# Patient Record
Sex: FEMALE | Race: BLACK OR AFRICAN AMERICAN | Employment: FULL TIME | URBAN - NONMETROPOLITAN AREA
[De-identification: names, ages, dates, MRNs, and addresses within clinical notes are randomized per-mention and may not be internally consistent; named-entity substitution may affect disease eponyms.]

---

## 2017-05-08 ENCOUNTER — OFFICE VISIT (OUTPATIENT)
Dept: FAMILY MEDICINE CLINIC | Age: 42
End: 2017-05-08

## 2017-05-08 VITALS
WEIGHT: 174 LBS | BODY MASS INDEX: 29.71 KG/M2 | RESPIRATION RATE: 15 BRPM | DIASTOLIC BLOOD PRESSURE: 76 MMHG | HEART RATE: 68 BPM | SYSTOLIC BLOOD PRESSURE: 118 MMHG | HEIGHT: 64 IN

## 2017-05-08 DIAGNOSIS — Z13.9 SCREENING: ICD-10-CM

## 2017-05-08 DIAGNOSIS — E11.9 TYPE 2 DIABETES MELLITUS WITHOUT COMPLICATION, WITHOUT LONG-TERM CURRENT USE OF INSULIN (HCC): Primary | ICD-10-CM

## 2017-05-08 DIAGNOSIS — E11.9 TYPE 2 DIABETES MELLITUS WITHOUT COMPLICATION, WITHOUT LONG-TERM CURRENT USE OF INSULIN (HCC): ICD-10-CM

## 2017-05-08 PROCEDURE — 99202 OFFICE O/P NEW SF 15 MIN: CPT | Performed by: NURSE PRACTITIONER

## 2017-05-08 PROCEDURE — 82044 UR ALBUMIN SEMIQUANTITATIVE: CPT | Performed by: NURSE PRACTITIONER

## 2017-05-08 RX ORDER — GLIMEPIRIDE 4 MG/1
4 TABLET ORAL NIGHTLY
Qty: 30 TABLET | Refills: 3 | Status: SHIPPED | OUTPATIENT
Start: 2017-05-08 | End: 2017-05-09 | Stop reason: DRUGHIGH

## 2017-05-08 RX ORDER — ATORVASTATIN CALCIUM 40 MG/1
40 TABLET, FILM COATED ORAL DAILY
Qty: 30 TABLET | Refills: 3 | Status: SHIPPED | OUTPATIENT
Start: 2017-05-08 | End: 2017-09-05 | Stop reason: SDUPTHER

## 2017-05-08 RX ORDER — LISINOPRIL 20 MG/1
20 TABLET ORAL NIGHTLY
Qty: 30 TABLET | Refills: 3 | Status: SHIPPED | OUTPATIENT
Start: 2017-05-08 | End: 2017-09-23 | Stop reason: SDUPTHER

## 2017-05-08 ASSESSMENT — ENCOUNTER SYMPTOMS
COUGH: 0
DIARRHEA: 0
CONSTIPATION: 0
CHEST TIGHTNESS: 0
NAUSEA: 0
SHORTNESS OF BREATH: 0
WHEEZING: 0
VOMITING: 0
ABDOMINAL PAIN: 0

## 2017-05-08 ASSESSMENT — PATIENT HEALTH QUESTIONNAIRE - PHQ9
SUM OF ALL RESPONSES TO PHQ QUESTIONS 1-9: 0
2. FEELING DOWN, DEPRESSED OR HOPELESS: 0
1. LITTLE INTEREST OR PLEASURE IN DOING THINGS: 0
SUM OF ALL RESPONSES TO PHQ9 QUESTIONS 1 & 2: 0

## 2017-05-09 DIAGNOSIS — E11.9 TYPE 2 DIABETES MELLITUS WITHOUT COMPLICATION, WITHOUT LONG-TERM CURRENT USE OF INSULIN (HCC): ICD-10-CM

## 2017-05-09 LAB
A/G RATIO: 1.5 (ref 1.1–2.2)
ALBUMIN SERPL-MCNC: 4 G/DL (ref 3.4–5)
ALP BLD-CCNC: 64 U/L (ref 40–129)
ALT SERPL-CCNC: 57 U/L (ref 10–40)
ANION GAP SERPL CALCULATED.3IONS-SCNC: 18 MMOL/L (ref 3–16)
AST SERPL-CCNC: 29 U/L (ref 15–37)
BILIRUB SERPL-MCNC: 0.3 MG/DL (ref 0–1)
BUN BLDV-MCNC: 12 MG/DL (ref 7–20)
CALCIUM SERPL-MCNC: 9 MG/DL (ref 8.3–10.6)
CHLORIDE BLD-SCNC: 98 MMOL/L (ref 99–110)
CHOLESTEROL, TOTAL: 97 MG/DL (ref 0–199)
CO2: 21 MMOL/L (ref 21–32)
CREAT SERPL-MCNC: 0.9 MG/DL (ref 0.6–1.1)
ESTIMATED AVERAGE GLUCOSE: 248.9 MG/DL
GFR AFRICAN AMERICAN: >60
GFR NON-AFRICAN AMERICAN: >60
GLOBULIN: 2.7 G/DL
GLUCOSE BLD-MCNC: 330 MG/DL (ref 70–99)
HBA1C MFR BLD: 10.3 %
HCT VFR BLD CALC: 38.4 % (ref 36–48)
HDLC SERPL-MCNC: 38 MG/DL (ref 40–60)
HEMOGLOBIN: 12.7 G/DL (ref 12–16)
LDL CHOLESTEROL CALCULATED: 17 MG/DL
MCH RBC QN AUTO: 30.9 PG (ref 26–34)
MCHC RBC AUTO-ENTMCNC: 33.1 G/DL (ref 31–36)
MCV RBC AUTO: 93.2 FL (ref 80–100)
PDW BLD-RTO: 12.3 % (ref 12.4–15.4)
PLATELET # BLD: 264 K/UL (ref 135–450)
PMV BLD AUTO: 9.9 FL (ref 5–10.5)
POTASSIUM SERPL-SCNC: 3.8 MMOL/L (ref 3.5–5.1)
RBC # BLD: 4.12 M/UL (ref 4–5.2)
SODIUM BLD-SCNC: 137 MMOL/L (ref 136–145)
TOTAL PROTEIN: 6.7 G/DL (ref 6.4–8.2)
TRIGL SERPL-MCNC: 210 MG/DL (ref 0–150)
TSH REFLEX: 1.07 UIU/ML (ref 0.27–4.2)
VITAMIN D 25-HYDROXY: 39.3 NG/ML
VLDLC SERPL CALC-MCNC: 42 MG/DL
WBC # BLD: 10.7 K/UL (ref 4–11)

## 2017-05-09 RX ORDER — GLIMEPIRIDE 2 MG/1
2 TABLET ORAL EVERY MORNING
Qty: 30 TABLET | Refills: 3 | Status: SHIPPED | OUTPATIENT
Start: 2017-05-09 | End: 2017-09-06 | Stop reason: SDUPTHER

## 2017-05-09 RX ORDER — GLIMEPIRIDE 4 MG/1
4 TABLET ORAL
Qty: 30 TABLET | Refills: 3 | Status: SHIPPED | OUTPATIENT
Start: 2017-05-09 | End: 2017-09-06 | Stop reason: SDUPTHER

## 2017-05-10 LAB — HIV-1 AND HIV-2 ANTIBODIES: NEGATIVE

## 2017-06-12 ENCOUNTER — OFFICE VISIT (OUTPATIENT)
Dept: FAMILY MEDICINE CLINIC | Age: 42
End: 2017-06-12

## 2017-06-12 VITALS
HEIGHT: 64 IN | BODY MASS INDEX: 29.37 KG/M2 | HEART RATE: 97 BPM | OXYGEN SATURATION: 98 % | DIASTOLIC BLOOD PRESSURE: 70 MMHG | SYSTOLIC BLOOD PRESSURE: 122 MMHG | WEIGHT: 172 LBS

## 2017-06-12 DIAGNOSIS — Z23 NEED FOR PROPHYLACTIC VACCINATION AGAINST DIPHTHERIA-TETANUS-PERTUSSIS (DTP): ICD-10-CM

## 2017-06-12 DIAGNOSIS — E11.9 TYPE 2 DIABETES MELLITUS WITHOUT COMPLICATION, WITHOUT LONG-TERM CURRENT USE OF INSULIN (HCC): Primary | ICD-10-CM

## 2017-06-12 DIAGNOSIS — R05.9 COUGH: ICD-10-CM

## 2017-06-12 DIAGNOSIS — J30.9 ALLERGIC RHINITIS, UNSPECIFIED ALLERGIC RHINITIS TRIGGER, UNSPECIFIED RHINITIS SEASONALITY: ICD-10-CM

## 2017-06-12 LAB
CREATININE URINE POCT: 200
MICROALBUMIN/CREAT 24H UR: 150 MG/G{CREAT}
MICROALBUMIN/CREAT UR-RTO: 30

## 2017-06-12 PROCEDURE — 90715 TDAP VACCINE 7 YRS/> IM: CPT | Performed by: NURSE PRACTITIONER

## 2017-06-12 PROCEDURE — 90471 IMMUNIZATION ADMIN: CPT | Performed by: NURSE PRACTITIONER

## 2017-06-12 PROCEDURE — 82044 UR ALBUMIN SEMIQUANTITATIVE: CPT | Performed by: NURSE PRACTITIONER

## 2017-06-12 PROCEDURE — 99213 OFFICE O/P EST LOW 20 MIN: CPT | Performed by: NURSE PRACTITIONER

## 2017-06-12 RX ORDER — LORATADINE 10 MG/1
10 TABLET ORAL DAILY
Qty: 30 TABLET | Refills: 0 | Status: SHIPPED | OUTPATIENT
Start: 2017-06-12 | End: 2017-08-14 | Stop reason: ALTCHOICE

## 2017-06-12 ASSESSMENT — ENCOUNTER SYMPTOMS
NAUSEA: 0
WHEEZING: 0
ABDOMINAL PAIN: 0
COUGH: 1
SHORTNESS OF BREATH: 0
CONSTIPATION: 0
SORE THROAT: 0
DIARRHEA: 0
VOMITING: 0
CHEST TIGHTNESS: 0

## 2017-07-17 ENCOUNTER — TELEPHONE (OUTPATIENT)
Dept: FAMILY MEDICINE CLINIC | Age: 42
End: 2017-07-17

## 2017-08-14 ENCOUNTER — OFFICE VISIT (OUTPATIENT)
Dept: FAMILY MEDICINE CLINIC | Age: 42
End: 2017-08-14

## 2017-08-14 VITALS
BODY MASS INDEX: 30.8 KG/M2 | HEIGHT: 64 IN | WEIGHT: 180.4 LBS | OXYGEN SATURATION: 97 % | HEART RATE: 81 BPM | SYSTOLIC BLOOD PRESSURE: 134 MMHG | DIASTOLIC BLOOD PRESSURE: 64 MMHG | RESPIRATION RATE: 16 BRPM

## 2017-08-14 DIAGNOSIS — E11.9 TYPE 2 DIABETES MELLITUS WITHOUT COMPLICATION, WITHOUT LONG-TERM CURRENT USE OF INSULIN (HCC): Primary | ICD-10-CM

## 2017-08-14 LAB — HBA1C MFR BLD: 7.7 %

## 2017-08-14 PROCEDURE — 83036 HEMOGLOBIN GLYCOSYLATED A1C: CPT | Performed by: NURSE PRACTITIONER

## 2017-08-14 PROCEDURE — 99213 OFFICE O/P EST LOW 20 MIN: CPT | Performed by: NURSE PRACTITIONER

## 2017-08-14 ASSESSMENT — ENCOUNTER SYMPTOMS
VOMITING: 0
CONSTIPATION: 0
COUGH: 0
NAUSEA: 0
CHEST TIGHTNESS: 0
SHORTNESS OF BREATH: 0
DIARRHEA: 0
ABDOMINAL PAIN: 0
WHEEZING: 0

## 2017-08-21 ENCOUNTER — OFFICE VISIT (OUTPATIENT)
Dept: FAMILY MEDICINE CLINIC | Age: 42
End: 2017-08-21

## 2017-08-21 VITALS
HEIGHT: 65 IN | OXYGEN SATURATION: 99 % | BODY MASS INDEX: 29.82 KG/M2 | SYSTOLIC BLOOD PRESSURE: 118 MMHG | DIASTOLIC BLOOD PRESSURE: 64 MMHG | RESPIRATION RATE: 15 BRPM | WEIGHT: 179 LBS | HEART RATE: 96 BPM

## 2017-08-21 DIAGNOSIS — B02.9 HERPES ZOSTER WITHOUT COMPLICATION: Primary | ICD-10-CM

## 2017-08-21 PROCEDURE — 99214 OFFICE O/P EST MOD 30 MIN: CPT | Performed by: NURSE PRACTITIONER

## 2017-08-21 RX ORDER — VALACYCLOVIR HYDROCHLORIDE 1 G/1
1000 TABLET, FILM COATED ORAL 3 TIMES DAILY
Qty: 21 TABLET | Refills: 0 | Status: SHIPPED | OUTPATIENT
Start: 2017-08-21 | End: 2017-08-28

## 2017-08-21 ASSESSMENT — ENCOUNTER SYMPTOMS
ABDOMINAL PAIN: 0
COUGH: 0
VOMITING: 0
CHEST TIGHTNESS: 0
CONSTIPATION: 0
NAUSEA: 0
WHEEZING: 0
SHORTNESS OF BREATH: 0
DIARRHEA: 0
RHINORRHEA: 0

## 2017-09-05 DIAGNOSIS — E11.9 TYPE 2 DIABETES MELLITUS WITHOUT COMPLICATION, WITHOUT LONG-TERM CURRENT USE OF INSULIN (HCC): ICD-10-CM

## 2017-09-06 DIAGNOSIS — E11.9 TYPE 2 DIABETES MELLITUS WITHOUT COMPLICATION, WITHOUT LONG-TERM CURRENT USE OF INSULIN (HCC): ICD-10-CM

## 2017-09-06 RX ORDER — GLIMEPIRIDE 2 MG/1
2 TABLET ORAL EVERY MORNING
Qty: 30 TABLET | Refills: 3 | Status: SHIPPED | OUTPATIENT
Start: 2017-09-06 | End: 2018-01-02 | Stop reason: SDUPTHER

## 2017-09-06 RX ORDER — GLIMEPIRIDE 4 MG/1
4 TABLET ORAL
Qty: 30 TABLET | Refills: 3 | Status: SHIPPED | OUTPATIENT
Start: 2017-09-06 | End: 2018-01-10 | Stop reason: SDUPTHER

## 2017-09-06 RX ORDER — ATORVASTATIN CALCIUM 40 MG/1
TABLET, FILM COATED ORAL
Qty: 30 TABLET | Refills: 3 | Status: SHIPPED | OUTPATIENT
Start: 2017-09-06 | End: 2018-01-02 | Stop reason: SDUPTHER

## 2017-09-23 DIAGNOSIS — E11.9 TYPE 2 DIABETES MELLITUS WITHOUT COMPLICATION, WITHOUT LONG-TERM CURRENT USE OF INSULIN (HCC): ICD-10-CM

## 2017-09-25 RX ORDER — LISINOPRIL 20 MG/1
TABLET ORAL
Qty: 30 TABLET | Refills: 3 | Status: SHIPPED | OUTPATIENT
Start: 2017-09-25 | End: 2018-02-05 | Stop reason: SDUPTHER

## 2017-11-13 ENCOUNTER — OFFICE VISIT (OUTPATIENT)
Dept: FAMILY MEDICINE CLINIC | Age: 42
End: 2017-11-13

## 2017-11-13 VITALS
HEART RATE: 99 BPM | WEIGHT: 181 LBS | DIASTOLIC BLOOD PRESSURE: 76 MMHG | RESPIRATION RATE: 16 BRPM | OXYGEN SATURATION: 99 % | SYSTOLIC BLOOD PRESSURE: 130 MMHG | BODY MASS INDEX: 30.59 KG/M2

## 2017-11-13 DIAGNOSIS — E11.9 TYPE 2 DIABETES MELLITUS WITHOUT COMPLICATION, WITHOUT LONG-TERM CURRENT USE OF INSULIN (HCC): Primary | ICD-10-CM

## 2017-11-13 PROBLEM — R05.9 COUGH: Status: RESOLVED | Noted: 2017-06-12 | Resolved: 2017-11-13

## 2017-11-13 PROCEDURE — 90688 IIV4 VACCINE SPLT 0.5 ML IM: CPT | Performed by: NURSE PRACTITIONER

## 2017-11-13 PROCEDURE — 90471 IMMUNIZATION ADMIN: CPT | Performed by: NURSE PRACTITIONER

## 2017-11-13 PROCEDURE — 99213 OFFICE O/P EST LOW 20 MIN: CPT | Performed by: NURSE PRACTITIONER

## 2017-11-13 ASSESSMENT — ENCOUNTER SYMPTOMS
VOMITING: 0
CONSTIPATION: 0
ABDOMINAL PAIN: 0
CHEST TIGHTNESS: 0
NAUSEA: 0
WHEEZING: 0
SHORTNESS OF BREATH: 0
DIARRHEA: 0
COUGH: 0

## 2017-11-13 NOTE — PROGRESS NOTES
SUBJECTIVE:    Valorie Peres  1975  43 y.o.  female      Chief Complaint   Patient presents with    3 Month Follow-Up     no concerns, doing well    Flu Vaccine     HPI    Problem List Items Addressed This Visit     Type 2 diabetes mellitus without complication ([facility] [address] - Primary     Running \"good, same as last time\"  Average morning glucose 90s  Average evening glucose in 90s  She has had occasional lows, but only when she skips a meal           Other Visit Diagnoses    None. Review of Systems   Constitutional: Negative for appetite change, chills, fatigue and unexpected weight change. Respiratory: Negative for cough, chest tightness, shortness of breath and wheezing. Cardiovascular: Negative for chest pain, palpitations and leg swelling. Gastrointestinal: Negative for abdominal pain, constipation, diarrhea, nausea and vomiting. Musculoskeletal: Negative for arthralgias. Neurological: Negative for weakness, numbness and headaches. Hematological: Negative for adenopathy. OBJECTIVE:    /76 (Site: Right Arm, Position: Sitting, Cuff Size: Medium Adult)   Pulse 99   Resp 16   Wt 181 lb (82.1 kg)   SpO2 99%   BMI 30.59 kg/m²     Physical Exam   Constitutional: She is oriented to person, place, and time. She appears well-developed and well-nourished. HENT:   Head: Normocephalic and atraumatic. Neck: Normal range of motion. Neck supple. Cardiovascular: Normal rate, regular rhythm and normal heart sounds. Exam reveals no gallop and no friction rub. No murmur heard. Pulmonary/Chest: Effort normal and breath sounds normal. No respiratory distress. She has no wheezes. She has no rhonchi. She has no rales. Abdominal: Soft. She exhibits no distension. There is no tenderness. Musculoskeletal: Normal range of motion. Neurological: She is alert and oriented to person, place, and time. Skin: Skin is warm and dry. Psychiatric: She has a normal mood and affect.

## 2017-11-20 VITALS
HEIGHT: 65 IN | HEART RATE: 92 BPM | WEIGHT: 180 LBS | SYSTOLIC BLOOD PRESSURE: 120 MMHG | DIASTOLIC BLOOD PRESSURE: 85 MMHG | BODY MASS INDEX: 29.99 KG/M2 | TEMPERATURE: 98.9 F

## 2018-01-02 DIAGNOSIS — E11.9 TYPE 2 DIABETES MELLITUS WITHOUT COMPLICATION, WITHOUT LONG-TERM CURRENT USE OF INSULIN (HCC): ICD-10-CM

## 2018-01-02 RX ORDER — GLIMEPIRIDE 2 MG/1
TABLET ORAL
Qty: 30 TABLET | Refills: 3 | Status: SHIPPED | OUTPATIENT
Start: 2018-01-02 | End: 2018-04-30 | Stop reason: SDUPTHER

## 2018-01-02 RX ORDER — ATORVASTATIN CALCIUM 40 MG/1
TABLET, FILM COATED ORAL
Qty: 30 TABLET | Refills: 3 | Status: SHIPPED | OUTPATIENT
Start: 2018-01-02 | End: 2018-05-07 | Stop reason: SDUPTHER

## 2018-02-19 ENCOUNTER — OFFICE VISIT (OUTPATIENT)
Dept: FAMILY MEDICINE CLINIC | Age: 43
End: 2018-02-19

## 2018-02-19 VITALS
SYSTOLIC BLOOD PRESSURE: 138 MMHG | OXYGEN SATURATION: 100 % | HEART RATE: 98 BPM | DIASTOLIC BLOOD PRESSURE: 86 MMHG | BODY MASS INDEX: 30.32 KG/M2 | RESPIRATION RATE: 20 BRPM | WEIGHT: 179.4 LBS

## 2018-02-19 DIAGNOSIS — I10 ESSENTIAL HYPERTENSION: ICD-10-CM

## 2018-02-19 DIAGNOSIS — E11.9 TYPE 2 DIABETES MELLITUS WITHOUT COMPLICATION, WITHOUT LONG-TERM CURRENT USE OF INSULIN (HCC): Primary | ICD-10-CM

## 2018-02-19 DIAGNOSIS — N93.9 ABNORMAL UTERINE BLEEDING (AUB): ICD-10-CM

## 2018-02-19 LAB — HBA1C MFR BLD: 8.2 %

## 2018-02-19 PROCEDURE — 99213 OFFICE O/P EST LOW 20 MIN: CPT | Performed by: NURSE PRACTITIONER

## 2018-02-19 PROCEDURE — 83036 HEMOGLOBIN GLYCOSYLATED A1C: CPT | Performed by: NURSE PRACTITIONER

## 2018-02-19 ASSESSMENT — ENCOUNTER SYMPTOMS
DIARRHEA: 0
WHEEZING: 0
VOMITING: 0
NAUSEA: 0
ABDOMINAL PAIN: 0
CONSTIPATION: 0
CHEST TIGHTNESS: 0
COUGH: 0
SHORTNESS OF BREATH: 0

## 2018-02-19 NOTE — PROGRESS NOTES
SUBJECTIVE:    Phoenix Germain  1975  43 y.o.  female      Chief Complaint   Patient presents with    3 Month Follow-Up     Pt. states no concerns today. HPI    Allergies   Allergen Reactions    Pravachol [Pravastatin] Nausea Only     Past Medical History:   Diagnosis Date    Abnormal uterine bleeding     Cough     Occ. Productive Cough Clear Sputum    Diabetes mellitus ([facility] [address] Dx 2006 Or 2007    Fibroids     Hypertension     \"on medication for high blood pressure of 6- 7 yrs\"    Sinus problem     Teeth missing     Upper And Lower    Viral gastroenteritis     Sweet teeth extracted     One Sweet Tooth Extracted In Past     Past Surgical History:   Procedure Laterality Date    CHOLECYSTECTOMY, LAPAROSCOPIC  7-7-15    COLONOSCOPY  2000's    ENDOMETRIAL ABLATION  2012    Tubal Ligation Also Done    HYSTERECTOMY  4/19/16    supracervical ABD hysterectomy/ repair of serosal tear of bowel    TUBAL LIGATION  2012    Done With Endometrial Ablation    WISDOM TOOTH EXTRACTION      One Sweet Tooth Extracted In Past     Social History     Tobacco History     Smoking Status  Current Some Day Smoker Smoking Start Date  4/15/2000 Smoking Frequency  0 packs/day for 16 years (0 pk yrs)    Smokeless Tobacco Use  Never Used    Tobacco Comment  \"I Smoke Black And Mild, They Are Like Little Cigars, Only Smoke Occ\"          Alcohol History     Alcohol Use Status  Yes Comment  \"Occ.  A Couple Times A Week\"          Drug Use     Drug Use Status  No          Sexual Activity     Sexually Active  Yes Partners  Male                Problem List Items Addressed This Visit     Abnormal uterine bleeding (AUB)     Dr. Per Bhat has her taking oral contraceptives daily         Type 2 diabetes mellitus without complication ([facility] [address] - Primary     Started going to gym this month  Going 4-5 days a week  At gym for 1-1.5 hours   States has felt hypoglycemic approximately once a week--usually around 100  Not always eating prior to going to gym     Morning glucose around 120-130    Denies numbness, tingling    A1c up to 8.2 from 7.7         Relevant Orders    POCT glycosylated hemoglobin (Hb A1C) (Completed)    Essential hypertension     Stable on lisinopril  Patient denies any exertional chest pain, dyspnea, palpitations, syncope, orthopnea, edema or paroxysmal nocturnal dyspnea. Other Visit Diagnoses    None. Review of Systems   Constitutional: Negative for appetite change, chills, fatigue and unexpected weight change. Respiratory: Negative for cough, chest tightness, shortness of breath and wheezing. Cardiovascular: Negative for chest pain. Gastrointestinal: Negative for abdominal pain, constipation, diarrhea, nausea and vomiting. Musculoskeletal: Negative for arthralgias. Neurological: Negative for weakness, numbness and headaches. Hematological: Negative for adenopathy. OBJECTIVE:    /86   Pulse 98   Resp 20   Wt 179 lb 6.4 oz (81.4 kg)   SpO2 100%   BMI 30.32 kg/m²     Physical Exam   Constitutional: She is oriented to person, place, and time. She appears well-developed and well-nourished. HENT:   Head: Normocephalic and atraumatic. Neck: Normal range of motion. Neck supple. No JVD present. Carotid bruit is not present. Cardiovascular: Normal rate, regular rhythm and normal heart sounds. Exam reveals no gallop and no friction rub. No murmur heard. Pulmonary/Chest: Effort normal and breath sounds normal. No respiratory distress. She has no wheezes. She has no rhonchi. She has no rales. Musculoskeletal: Normal range of motion. She exhibits no edema. Neurological: She is alert and oriented to person, place, and time. Skin: Skin is warm and dry. Psychiatric: She has a normal mood and affect. Her behavior is normal.       ASSESSMENT/PLAN:    1.  Type 2 diabetes mellitus without complication, without long-term current use of insulin ([facility] [address]  Patient to continue current

## 2018-02-19 NOTE — ASSESSMENT & PLAN NOTE
Started going to gym this month  Going 4-5 days a week  At gym for 1-1.5 hours   States has felt hypoglycemic approximately once a week--usually around 100  Not always eating prior to going to gym     Morning glucose around 120-130    Denies numbness, tingling    A1c up to 8.2 from 7.7

## 2018-04-30 DIAGNOSIS — E11.9 TYPE 2 DIABETES MELLITUS WITHOUT COMPLICATION, WITHOUT LONG-TERM CURRENT USE OF INSULIN (HCC): ICD-10-CM

## 2018-04-30 RX ORDER — GLIMEPIRIDE 2 MG/1
TABLET ORAL
Qty: 30 TABLET | Refills: 5 | Status: SHIPPED | OUTPATIENT
Start: 2018-04-30 | End: 2018-07-20

## 2018-05-07 DIAGNOSIS — E11.9 TYPE 2 DIABETES MELLITUS WITHOUT COMPLICATION, WITHOUT LONG-TERM CURRENT USE OF INSULIN (HCC): ICD-10-CM

## 2018-05-07 RX ORDER — ATORVASTATIN CALCIUM 40 MG/1
TABLET, FILM COATED ORAL
Qty: 30 TABLET | Refills: 5 | Status: SHIPPED | OUTPATIENT
Start: 2018-05-07 | End: 2018-10-31 | Stop reason: SDUPTHER

## 2018-06-08 ENCOUNTER — HOSPITAL ENCOUNTER (OUTPATIENT)
Dept: MAMMOGRAPHY | Age: 43
Discharge: HOME | End: 2018-06-08
Attending: OBSTETRICS & GYNECOLOGY | Admitting: OBSTETRICS & GYNECOLOGY

## 2018-06-08 DIAGNOSIS — Z12.31 VISIT FOR SCREENING MAMMOGRAM: ICD-10-CM

## 2018-06-11 ENCOUNTER — TELEPHONE (OUTPATIENT)
Dept: FAMILY MEDICINE CLINIC | Age: 43
End: 2018-06-11

## 2018-07-25 DIAGNOSIS — E11.9 TYPE 2 DIABETES MELLITUS WITHOUT COMPLICATION, WITHOUT LONG-TERM CURRENT USE OF INSULIN (HCC): ICD-10-CM

## 2018-11-05 ENCOUNTER — OFFICE VISIT (OUTPATIENT)
Dept: FAMILY MEDICINE CLINIC | Age: 43
End: 2018-11-05
Payer: COMMERCIAL

## 2018-11-05 VITALS
OXYGEN SATURATION: 99 % | HEART RATE: 88 BPM | SYSTOLIC BLOOD PRESSURE: 120 MMHG | BODY MASS INDEX: 30.83 KG/M2 | RESPIRATION RATE: 20 BRPM | HEIGHT: 64 IN | WEIGHT: 180.6 LBS | DIASTOLIC BLOOD PRESSURE: 78 MMHG

## 2018-11-05 DIAGNOSIS — I10 ESSENTIAL HYPERTENSION: ICD-10-CM

## 2018-11-05 DIAGNOSIS — R05.9 COUGH: Primary | ICD-10-CM

## 2018-11-05 DIAGNOSIS — E11.9 TYPE 2 DIABETES MELLITUS WITHOUT COMPLICATION, WITHOUT LONG-TERM CURRENT USE OF INSULIN (HCC): ICD-10-CM

## 2018-11-05 LAB
A/G RATIO: 1.6 (ref 1.1–2.2)
ALBUMIN SERPL-MCNC: 4 G/DL (ref 3.4–5)
ALP BLD-CCNC: 58 U/L (ref 40–129)
ALT SERPL-CCNC: 29 U/L (ref 10–40)
ANION GAP SERPL CALCULATED.3IONS-SCNC: 18 MMOL/L (ref 3–16)
AST SERPL-CCNC: 18 U/L (ref 15–37)
BILIRUB SERPL-MCNC: 0.3 MG/DL (ref 0–1)
BUN BLDV-MCNC: 16 MG/DL (ref 7–20)
CALCIUM SERPL-MCNC: 9.6 MG/DL (ref 8.3–10.6)
CHLORIDE BLD-SCNC: 102 MMOL/L (ref 99–110)
CHOLESTEROL, TOTAL: 69 MG/DL (ref 0–199)
CO2: 21 MMOL/L (ref 21–32)
CREAT SERPL-MCNC: 0.9 MG/DL (ref 0.6–1.1)
CREATININE URINE POCT: 200
GFR AFRICAN AMERICAN: >60
GFR NON-AFRICAN AMERICAN: >60
GLOBULIN: 2.5 G/DL
GLUCOSE BLD-MCNC: 181 MG/DL (ref 70–99)
HCT VFR BLD CALC: 35.9 % (ref 36–48)
HDLC SERPL-MCNC: 37 MG/DL (ref 40–60)
HEMOGLOBIN: 12 G/DL (ref 12–16)
LDL CHOLESTEROL CALCULATED: 8 MG/DL
MCH RBC QN AUTO: 30.9 PG (ref 26–34)
MCHC RBC AUTO-ENTMCNC: 33.4 G/DL (ref 31–36)
MCV RBC AUTO: 92.8 FL (ref 80–100)
MICROALBUMIN/CREAT 24H UR: 150 MG/G{CREAT}
MICROALBUMIN/CREAT UR-RTO: ABNORMAL
PDW BLD-RTO: 12.4 % (ref 12.4–15.4)
PLATELET # BLD: 294 K/UL (ref 135–450)
PMV BLD AUTO: 9.6 FL (ref 5–10.5)
POTASSIUM SERPL-SCNC: 3.9 MMOL/L (ref 3.5–5.1)
RBC # BLD: 3.87 M/UL (ref 4–5.2)
SODIUM BLD-SCNC: 141 MMOL/L (ref 136–145)
TOTAL PROTEIN: 6.5 G/DL (ref 6.4–8.2)
TRIGL SERPL-MCNC: 119 MG/DL (ref 0–150)
VLDLC SERPL CALC-MCNC: 24 MG/DL
WBC # BLD: 9.3 K/UL (ref 4–11)

## 2018-11-05 PROCEDURE — 90686 IIV4 VACC NO PRSV 0.5 ML IM: CPT | Performed by: NURSE PRACTITIONER

## 2018-11-05 PROCEDURE — 82044 UR ALBUMIN SEMIQUANTITATIVE: CPT | Performed by: NURSE PRACTITIONER

## 2018-11-05 PROCEDURE — 99213 OFFICE O/P EST LOW 20 MIN: CPT | Performed by: NURSE PRACTITIONER

## 2018-11-05 PROCEDURE — 90471 IMMUNIZATION ADMIN: CPT | Performed by: NURSE PRACTITIONER

## 2018-11-05 RX ORDER — BENZONATATE 100 MG/1
100-200 CAPSULE ORAL 3 TIMES DAILY PRN
Qty: 30 CAPSULE | Refills: 0 | Status: SHIPPED | OUTPATIENT
Start: 2018-11-05 | End: 2018-11-12

## 2018-11-05 ASSESSMENT — ENCOUNTER SYMPTOMS
CONSTIPATION: 0
WHEEZING: 0
VOMITING: 0
DIARRHEA: 0
NAUSEA: 0
ABDOMINAL PAIN: 0
CHEST TIGHTNESS: 0
SHORTNESS OF BREATH: 0
COUGH: 1

## 2018-11-05 ASSESSMENT — PATIENT HEALTH QUESTIONNAIRE - PHQ9
SUM OF ALL RESPONSES TO PHQ QUESTIONS 1-9: 0
2. FEELING DOWN, DEPRESSED OR HOPELESS: 0
SUM OF ALL RESPONSES TO PHQ QUESTIONS 1-9: 0
SUM OF ALL RESPONSES TO PHQ9 QUESTIONS 1 & 2: 0
1. LITTLE INTEREST OR PLEASURE IN DOING THINGS: 0

## 2018-11-05 NOTE — PROGRESS NOTES
Constitutional: She is oriented to person, place, and time. She appears well-developed and well-nourished. HENT:   Head: Normocephalic and atraumatic. Neck: Normal range of motion. Neck supple. No JVD present. Carotid bruit is not present. Cardiovascular: Normal rate, regular rhythm and normal heart sounds. Exam reveals no gallop and no friction rub. No murmur heard. Pulses:       Dorsalis pedis pulses are 2+ on the right side, and 2+ on the left side. Pulmonary/Chest: Effort normal and breath sounds normal. No respiratory distress. She has no wheezes. She has no rhonchi. She has no rales. Abdominal: Soft. Musculoskeletal: Normal range of motion. She exhibits no edema. Right foot: There is no deformity. Left foot: There is no deformity. Feet:   Right Foot:   Protective Sensation: 10 sites tested. 10 sites sensed. Skin Integrity: Negative for ulcer, blister, skin breakdown or erythema. Left Foot:   Protective Sensation: 10 sites tested. 10 sites sensed. Skin Integrity: Negative for ulcer, blister, skin breakdown or erythema. Neurological: She is alert and oriented to person, place, and time. Skin: Skin is warm and dry. Psychiatric: She has a normal mood and affect. Her behavior is normal.       ASSESSMENT/PLAN:    1. Type 2 diabetes mellitus without complication, without long-term current use of insulin ([facility])  Continue to monitor glucose. Return in three months. Discussed eating snack in afternoon if longer length between meals to avoid symptoms of hypoglycemia  - CBC; Future  - Comprehensive Metabolic Panel; Future  - Lipid Panel; Future  - POCT microalbumin  - HM DIABETES FOOT EXAM    2. Essential hypertension  Continue current medication    3. Cough  Tessalon PRN. Follow up if cough persists. - benzonatate (TESSALON PERLES) 100 MG capsule; Take 1-2 capsules by mouth 3 times daily as needed for Cough  Dispense: 30 capsule;  Refill: 0               Return in about 3 months (around 2/5/2019).       (Please note that portions of this note may have been completed with a voice recognition program. Efforts were made to edit the dictations but occasionally words aremis-transcribed.)

## 2018-11-05 NOTE — PATIENT INSTRUCTIONS
[facility]: Asif Queen MD   Directions    [address], [place] [address] [phone]

## 2018-12-19 DIAGNOSIS — E11.9 TYPE 2 DIABETES MELLITUS WITHOUT COMPLICATION, WITHOUT LONG-TERM CURRENT USE OF INSULIN (HCC): ICD-10-CM

## 2018-12-20 DIAGNOSIS — E11.9 TYPE 2 DIABETES MELLITUS WITHOUT COMPLICATION, WITHOUT LONG-TERM CURRENT USE OF INSULIN (HCC): ICD-10-CM

## 2018-12-20 RX ORDER — GLIMEPIRIDE 2 MG/1
TABLET ORAL
Qty: 30 TABLET | Refills: 0 | Status: SHIPPED | OUTPATIENT
Start: 2018-12-20 | End: 2018-12-20 | Stop reason: SDUPTHER

## 2018-12-20 RX ORDER — GLIMEPIRIDE 2 MG/1
TABLET ORAL
Qty: 30 TABLET | Refills: 0 | Status: SHIPPED | OUTPATIENT
Start: 2018-12-20 | End: 2019-02-18 | Stop reason: SDUPTHER

## 2019-01-24 DIAGNOSIS — E11.9 TYPE 2 DIABETES MELLITUS WITHOUT COMPLICATION, WITHOUT LONG-TERM CURRENT USE OF INSULIN (HCC): ICD-10-CM

## 2019-02-27 DIAGNOSIS — E11.9 TYPE 2 DIABETES MELLITUS WITHOUT COMPLICATION, WITHOUT LONG-TERM CURRENT USE OF INSULIN (HCC): ICD-10-CM

## 2019-02-27 RX ORDER — LISINOPRIL 20 MG/1
TABLET ORAL
Qty: 30 TABLET | Refills: 5 | Status: SHIPPED | OUTPATIENT
Start: 2019-02-27 | End: 2019-05-16 | Stop reason: SDUPTHER

## 2019-04-25 ENCOUNTER — TELEPHONE (OUTPATIENT)
Dept: FAMILY MEDICINE CLINIC | Age: 44
End: 2019-04-25

## 2019-05-07 DIAGNOSIS — E11.9 TYPE 2 DIABETES MELLITUS WITHOUT COMPLICATION, WITHOUT LONG-TERM CURRENT USE OF INSULIN (HCC): ICD-10-CM

## 2019-05-08 RX ORDER — ATORVASTATIN CALCIUM 40 MG/1
TABLET, FILM COATED ORAL
Qty: 30 TABLET | Refills: 3 | Status: SHIPPED | OUTPATIENT
Start: 2019-05-08 | End: 2019-05-16 | Stop reason: SDUPTHER

## 2019-05-16 ENCOUNTER — OFFICE VISIT (OUTPATIENT)
Dept: FAMILY MEDICINE CLINIC | Age: 44
End: 2019-05-16
Payer: COMMERCIAL

## 2019-05-16 VITALS
DIASTOLIC BLOOD PRESSURE: 84 MMHG | HEART RATE: 114 BPM | SYSTOLIC BLOOD PRESSURE: 126 MMHG | BODY MASS INDEX: 30.21 KG/M2 | RESPIRATION RATE: 16 BRPM | WEIGHT: 176 LBS

## 2019-05-16 DIAGNOSIS — N93.9 ABNORMAL UTERINE BLEEDING (AUB): ICD-10-CM

## 2019-05-16 DIAGNOSIS — I10 ESSENTIAL HYPERTENSION: ICD-10-CM

## 2019-05-16 DIAGNOSIS — R00.0 TACHYCARDIA: ICD-10-CM

## 2019-05-16 DIAGNOSIS — E11.9 TYPE 2 DIABETES MELLITUS WITHOUT COMPLICATION, WITHOUT LONG-TERM CURRENT USE OF INSULIN (HCC): Primary | ICD-10-CM

## 2019-05-16 LAB — HBA1C MFR BLD: 8 %

## 2019-05-16 PROCEDURE — 83036 HEMOGLOBIN GLYCOSYLATED A1C: CPT | Performed by: NURSE PRACTITIONER

## 2019-05-16 PROCEDURE — 99213 OFFICE O/P EST LOW 20 MIN: CPT | Performed by: NURSE PRACTITIONER

## 2019-05-16 RX ORDER — GLIMEPIRIDE 2 MG/1
TABLET ORAL
Qty: 90 TABLET | Refills: 1 | Status: SHIPPED | OUTPATIENT
Start: 2019-05-16 | End: 2019-12-02 | Stop reason: SDUPTHER

## 2019-05-16 RX ORDER — ATORVASTATIN CALCIUM 40 MG/1
TABLET, FILM COATED ORAL
Qty: 90 TABLET | Refills: 3 | Status: SHIPPED | OUTPATIENT
Start: 2019-05-16 | End: 2020-06-03

## 2019-05-16 RX ORDER — LISINOPRIL 20 MG/1
TABLET ORAL
Qty: 90 TABLET | Refills: 3 | Status: SHIPPED | OUTPATIENT
Start: 2019-05-16 | End: 2020-05-28

## 2019-05-16 RX ORDER — GLIMEPIRIDE 4 MG/1
TABLET ORAL
Qty: 90 TABLET | Refills: 1 | Status: SHIPPED | OUTPATIENT
Start: 2019-05-16 | End: 2019-12-02 | Stop reason: SDUPTHER

## 2019-05-16 ASSESSMENT — ENCOUNTER SYMPTOMS
CHEST TIGHTNESS: 0
COUGH: 0
WHEEZING: 0
ABDOMINAL PAIN: 0
CONSTIPATION: 0
SHORTNESS OF BREATH: 0
NAUSEA: 0
DIARRHEA: 0
VOMITING: 0

## 2019-05-16 ASSESSMENT — PATIENT HEALTH QUESTIONNAIRE - PHQ9
1. LITTLE INTEREST OR PLEASURE IN DOING THINGS: 0
SUM OF ALL RESPONSES TO PHQ QUESTIONS 1-9: 0
2. FEELING DOWN, DEPRESSED OR HOPELESS: 0
SUM OF ALL RESPONSES TO PHQ9 QUESTIONS 1 & 2: 0
SUM OF ALL RESPONSES TO PHQ QUESTIONS 1-9: 0

## 2019-05-16 NOTE — PROGRESS NOTES
SUBJECTIVE:    Nenita Lena  1975  37 y.o.  female      Chief Complaint   Patient presents with    3 Month Follow-Up     HPI    Allergies   Allergen Reactions    Pravachol [Pravastatin] Nausea Only       Current Outpatient Medications   Medication Sig Dispense Refill    atorvastatin (LIPITOR) 40 MG tablet TAKE 1 TABLET BY MOUTH ONCE DAILY 90 tablet 3    lisinopril (PRINIVIL;ZESTRIL) 20 MG tablet TAKE 1 TABLET BY MOUTH ONCE DAILY AT NIGHT 90 tablet 3    metFORMIN (GLUCOPHAGE) 500 MG tablet TAKE 2 TABLETS BY MOUTH TWICE DAILY WITH MEALS 360 tablet 3    glimepiride (AMARYL) 4 MG tablet TAKE 1 TABLET BY MOUTH IN THE MORNING BEFORE BREAKFAST TAKE  WITH  2MG  TABLET  AS  DIRECTED 90 tablet 1    glimepiride (AMARYL) 2 MG tablet TAKE 1 TABLET BY MOUTH ONCE DAILY IN THE MORNING (WITH  4  MG  TABLET  AS  DIRECTED) 90 tablet 1    linagliptin (TRADJENTA) 5 MG tablet Take 1 tablet by mouth daily 90 tablet 1    Levonorgest-Eth Estrad 91-Day (CAMRESE LO PO) Take by mouth nightly       No current facility-administered medications for this visit. Past Medical History:   Diagnosis Date    Abnormal uterine bleeding     Cough     Occ.  Productive Cough Clear Sputum    Diabetes mellitus ([facility] [address] Dx 2006 Or 2007    Fibroids     Hypertension     \"on medication for high blood pressure of 6- 7 yrs\"    Sinus problem     Teeth missing     Upper And Lower    Viral gastroenteritis     [place] teeth extracted     One [place] Tooth Extracted In Past     Past Surgical History:   Procedure Laterality Date    CHOLECYSTECTOMY, LAPAROSCOPIC  7-7-15    COLONOSCOPY  2000's    ENDOMETRIAL ABLATION  2012    Tubal Ligation Also Done    HYSTERECTOMY  4/19/16    supracervical ABD hysterectomy/ repair of serosal tear of bowel    TUBAL LIGATION  2012    Done With Endometrial Ablation    WISDOM TOOTH EXTRACTION      One [place] Tooth Extracted In Past     Social History     Socioeconomic History    Marital status:      Spouse name: None    Number of children: None    Years of education: None    Highest education level: None   Occupational History    None   Social Needs    Financial resource strain: None    Food insecurity:     Worry: None     Inability: None    Transportation needs:     Medical: None     Non-medical: None   Tobacco Use    Smoking status: Current Some Day Smoker     Packs/day: 0.00     Years: 16.00     Pack years: 0.00     Start date: 4/15/2000    Smokeless tobacco: Never Used    Tobacco comment: \"I Smoke Black And Mild, They Are Like Little Cigars, Only Smoke Occ\"   Substance and Sexual Activity    Alcohol use: Yes     Comment: \"Occ. A Couple Times A Week\"    Drug use: No    Sexual activity: Yes     Partners: Male   Lifestyle    Physical activity:     Days per week: None     Minutes per session: None    Stress: None   Relationships    Social connections:     Talks on phone: None     Gets together: None     Attends Taoist service: None     Active member of club or organization: None     Attends meetings of clubs or organizations: None     Relationship status: None    Intimate partner violence:     Fear of current or ex partner: None     Emotionally abused: None     Physically abused: None     Forced sexual activity: None   Other Topics Concern    None   Social History Narrative    None         Type 2 diabetes mellitus without complication (HCC)  W7D 8.0. Denies numbness or tingling. Denies polyuria, polyphagia, polydipsia. She is doing maxwell three times a week. She is checking her glucose. Evening is around 80s. She has not been checking in the evening. Essential hypertension  Stable. Patient denies any exertional chest pain, dyspnea, palpitations, syncope, orthopnea, edema or paroxysmal nocturnal dyspnea. Abnormal uterine bleeding (AUB)  She had a partial hysterectomy by Dr. Dori Fajardo. She is on continuous oral contraceptives due to pain with periods. She goes yearly. Review of Systems   Constitutional: Negative for appetite change, chills, fatigue and unexpected weight change. Respiratory: Negative for cough, chest tightness, shortness of breath and wheezing. Cardiovascular: Negative for chest pain, palpitations and leg swelling. Gastrointestinal: Negative for abdominal pain, constipation, diarrhea, nausea and vomiting. Musculoskeletal: Negative for arthralgias. Neurological: Negative for weakness, numbness and headaches. Hematological: Negative for adenopathy. OBJECTIVE:    /84 (Site: Left Upper Arm, Position: Sitting, Cuff Size: Medium Adult)   Pulse 114   Resp 16   Wt 176 lb (79.8 kg)   LMP  (LMP Unknown)   BMI 30.21 kg/m²     Physical Exam   Constitutional: She is oriented to person, place, and time. She appears well-developed and well-nourished. HENT:   Head: Normocephalic and atraumatic. Neck: Normal range of motion. Neck supple. No JVD present. Carotid bruit is not present. Cardiovascular: Normal rate, regular rhythm and normal heart sounds. Exam reveals no gallop and no friction rub. No murmur heard. Pulmonary/Chest: Effort normal and breath sounds normal. No respiratory distress. She has no wheezes. She has no rhonchi. She has no rales. Abdominal: Soft. Musculoskeletal: Normal range of motion. She exhibits no edema. Neurological: She is alert and oriented to person, place, and time. Skin: Skin is warm and dry. Psychiatric: She has a normal mood and affect. Her behavior is normal.       ASSESSMENT/PLAN:    1. Type 2 diabetes mellitus without complication, without long-term current use of insulin ([facility])  A1c 8. Will start tradjenta. Continue to monitor glucose. Follow up in three months  - POCT glycosylated hemoglobin (Hb A1C)  - atorvastatin (LIPITOR) 40 MG tablet; TAKE 1 TABLET BY MOUTH ONCE DAILY  Dispense: 90 tablet;  Refill: 3  - lisinopril (PRINIVIL;ZESTRIL) 20 MG tablet; TAKE 1 TABLET BY MOUTH ONCE DAILY AT NIGHT  Dispense: 90 tablet; Refill: 3  - metFORMIN (GLUCOPHAGE) 500 MG tablet; TAKE 2 TABLETS BY MOUTH TWICE DAILY WITH MEALS  Dispense: 360 tablet; Refill: 3  - glimepiride (AMARYL) 4 MG tablet; TAKE 1 TABLET BY MOUTH IN THE MORNING BEFORE BREAKFAST TAKE  WITH  2MG  TABLET  AS  DIRECTED  Dispense: 90 tablet; Refill: 1  - glimepiride (AMARYL) 2 MG tablet; TAKE 1 TABLET BY MOUTH ONCE DAILY IN THE MORNING (WITH  4  MG  TABLET  AS  DIRECTED)  Dispense: 90 tablet; Refill: 1  - External Referral To Ophthalmology    2. Essential hypertension  Continue current medication    3. Abnormal uterine bleeding (AUB)  Follow up with gynecology as scheduled    4. Tachycardia  Patient only drank a glass of pop with lunch today. Likely dehydrated. Encouraged to increase fluid intake. Return in about 3 months (around 8/16/2019).       (Please note that portions of this note may have been completed with a voice recognition program. Efforts were made to edit the dictations but occasionally words aremis-transcribed.)

## 2019-05-16 NOTE — ASSESSMENT & PLAN NOTE
She had a partial hysterectomy by Dr. Tati Douglas. She is on continuous oral contraceptives due to pain with periods. She goes yearly.

## 2019-05-16 NOTE — ASSESSMENT & PLAN NOTE
A1c 8.0. Denies numbness or tingling. Denies polyuria, polyphagia, polydipsia. She is doing maxwell three times a week. She is checking her glucose. Evening is around 80s. She has not been checking in the evening.

## 2019-05-16 NOTE — PATIENT INSTRUCTIONS
[facility]: [address] [facility] MD [address], [facility], [facility] [address] [phone]

## 2019-05-17 ENCOUNTER — TELEPHONE (OUTPATIENT)
Dept: FAMILY MEDICINE CLINIC | Age: 44
End: 2019-05-17

## 2019-05-17 NOTE — TELEPHONE ENCOUNTER
Pt called in stating that Rx for Rand Kuo was $1241 and she was not going to be picking that up. Please advise.

## 2019-11-05 ENCOUNTER — HOSPITAL ENCOUNTER (OUTPATIENT)
Age: 44
Setting detail: SPECIMEN
Discharge: HOME | End: 2019-11-05

## 2019-11-05 PROCEDURE — 86735 MUMPS ANTIBODY: CPT

## 2019-11-05 PROCEDURE — 86765 RUBEOLA ANTIBODY: CPT

## 2019-11-05 PROCEDURE — 86787 VARICELLA-ZOSTER ANTIBODY: CPT

## 2019-11-05 PROCEDURE — 86762 RUBELLA ANTIBODY: CPT

## 2019-11-05 PROCEDURE — 86481 TB AG RESPONSE T-CELL SUSP: CPT

## 2019-11-07 LAB
MUV IGG SER QL: 106 AU/ML
MUV IGG SER QL: NORMAL AU/ML
RUBELLA ANTIBODY IGG: 9.1 IU/ML
RUBELLA ANTIBODY IGG: NORMAL IU/ML
RUBEOLA (MEASLES) AB IGG: 6.5 AU/ML
RUBEOLA (MEASLES) AB IGG: NORMAL AU/ML
VARICELLA-ZOSTER VIRUS AB, IGG: 2070 IV
VARICELLA-ZOSTER VIRUS AB, IGG: NORMAL IV

## 2019-11-08 LAB
NIL (NEGATIVE) SPOT CONTROL: NORMAL
PANEL A SPOT COUNT: 0
PANEL B SPOT COUNT: 0
POSITIVE CONTROL SPOT COUNT: NORMAL
POSITIVE CONTROL SPOT COUNT: NORMAL
TB CELL IMMUNE MEASURE: NORMAL

## 2019-11-18 ENCOUNTER — HOSPITAL ENCOUNTER (OUTPATIENT)
Dept: MAMMOGRAPHY | Age: 44
Discharge: HOME | End: 2019-11-18
Payer: COMMERCIAL

## 2019-11-18 DIAGNOSIS — Z12.31 VISIT FOR SCREENING MAMMOGRAM: ICD-10-CM

## 2019-11-18 PROCEDURE — 77063 BREAST TOMOSYNTHESIS BI: CPT

## 2019-12-19 ENCOUNTER — OFFICE VISIT (OUTPATIENT)
Dept: FAMILY MEDICINE CLINIC | Age: 44
End: 2019-12-19
Payer: COMMERCIAL

## 2019-12-19 VITALS
DIASTOLIC BLOOD PRESSURE: 80 MMHG | WEIGHT: 176.13 LBS | OXYGEN SATURATION: 98 % | HEART RATE: 111 BPM | TEMPERATURE: 99.4 F | RESPIRATION RATE: 16 BRPM | HEIGHT: 64 IN | SYSTOLIC BLOOD PRESSURE: 122 MMHG | BODY MASS INDEX: 30.07 KG/M2

## 2019-12-19 DIAGNOSIS — J40 BRONCHITIS: Primary | ICD-10-CM

## 2019-12-19 PROCEDURE — 99213 OFFICE O/P EST LOW 20 MIN: CPT | Performed by: NURSE PRACTITIONER

## 2019-12-19 RX ORDER — BENZONATATE 100 MG/1
100 CAPSULE ORAL 2 TIMES DAILY PRN
Qty: 20 CAPSULE | Refills: 0 | Status: SHIPPED | OUTPATIENT
Start: 2019-12-19 | End: 2019-12-26

## 2019-12-19 RX ORDER — AMOXICILLIN 500 MG/1
500 CAPSULE ORAL 2 TIMES DAILY
Qty: 20 CAPSULE | Refills: 0 | Status: SHIPPED | OUTPATIENT
Start: 2019-12-19 | End: 2019-12-29

## 2019-12-19 RX ORDER — ALBUTEROL SULFATE 90 UG/1
2 AEROSOL, METERED RESPIRATORY (INHALATION) 4 TIMES DAILY PRN
Qty: 1 INHALER | Refills: 0 | Status: SHIPPED | OUTPATIENT
Start: 2019-12-19 | End: 2020-07-09

## 2019-12-19 ASSESSMENT — PATIENT HEALTH QUESTIONNAIRE - PHQ9
SUM OF ALL RESPONSES TO PHQ QUESTIONS 1-9: 0
1. LITTLE INTEREST OR PLEASURE IN DOING THINGS: 0
2. FEELING DOWN, DEPRESSED OR HOPELESS: 0
SUM OF ALL RESPONSES TO PHQ QUESTIONS 1-9: 0
SUM OF ALL RESPONSES TO PHQ9 QUESTIONS 1 & 2: 0

## 2019-12-21 ASSESSMENT — ENCOUNTER SYMPTOMS
SORE THROAT: 0
SINUS PRESSURE: 0
EYE DISCHARGE: 0
BACK PAIN: 0
STRIDOR: 0
VOMITING: 0
PHOTOPHOBIA: 0
EYE PAIN: 0
COUGH: 1
ABDOMINAL DISTENTION: 0
RHINORRHEA: 0
EYE REDNESS: 0
DIARRHEA: 0
CHEST TIGHTNESS: 0
SHORTNESS OF BREATH: 0
WHEEZING: 0
ABDOMINAL PAIN: 0
SINUS PAIN: 0
NAUSEA: 0

## 2019-12-27 ENCOUNTER — OFFICE VISIT (OUTPATIENT)
Dept: FAMILY MEDICINE CLINIC | Age: 44
End: 2019-12-27
Payer: COMMERCIAL

## 2019-12-27 VITALS
RESPIRATION RATE: 18 BRPM | OXYGEN SATURATION: 99 % | BODY MASS INDEX: 30.07 KG/M2 | HEART RATE: 102 BPM | DIASTOLIC BLOOD PRESSURE: 82 MMHG | SYSTOLIC BLOOD PRESSURE: 118 MMHG | WEIGHT: 175.2 LBS

## 2019-12-27 DIAGNOSIS — J40 BRONCHITIS: Primary | ICD-10-CM

## 2019-12-27 PROCEDURE — 99213 OFFICE O/P EST LOW 20 MIN: CPT | Performed by: NURSE PRACTITIONER

## 2019-12-27 ASSESSMENT — ENCOUNTER SYMPTOMS
BACK PAIN: 0
SORE THROAT: 0
TROUBLE SWALLOWING: 0
WHEEZING: 0
CHEST TIGHTNESS: 0
ABDOMINAL PAIN: 0
VOMITING: 0
CONSTIPATION: 0
PHOTOPHOBIA: 0
DIARRHEA: 0
SINUS PRESSURE: 0
EYE PAIN: 0
NAUSEA: 0
SINUS PAIN: 0
COUGH: 1
RHINORRHEA: 0
BLOOD IN STOOL: 0
SHORTNESS OF BREATH: 0

## 2019-12-27 ASSESSMENT — PATIENT HEALTH QUESTIONNAIRE - PHQ9
SUM OF ALL RESPONSES TO PHQ QUESTIONS 1-9: 0
2. FEELING DOWN, DEPRESSED OR HOPELESS: 0
SUM OF ALL RESPONSES TO PHQ9 QUESTIONS 1 & 2: 0
SUM OF ALL RESPONSES TO PHQ QUESTIONS 1-9: 0
1. LITTLE INTEREST OR PLEASURE IN DOING THINGS: 0

## 2020-02-26 RX ORDER — GLIMEPIRIDE 2 MG/1
TABLET ORAL
Qty: 90 TABLET | Refills: 0 | Status: SHIPPED | OUTPATIENT
Start: 2020-02-26 | End: 2020-05-28

## 2020-07-04 NOTE — LETTER
[address] [place] [address]. Kayli Rae [address]  Phone: [phone]  Fax: [phone]    Curtis Lockharts, APRN - CNP        July 27, 2020    [address] [place] [address]      Dear Elda Barrera: Your medications have been refilled at this time but if you require more refills you will have to schedule an appt. Please note they will not be refilled without an appt. or virtual visit with your provider. You may schedule with any provider in the office to establish care. If you were a former patient of Dr. Ninoska Thorpe, please keep in mind he no longer with the practice. We have at this time two Nurse Practioners and Physicians Assistant that you may schedule a appt with at this time. Please contact the office if you have any questions.     Thank you,    Adolph Eddy LPN/ Patient/Caregiver provided printed discharge information.

## 2020-07-07 RX ORDER — ATORVASTATIN CALCIUM 40 MG/1
40 TABLET, FILM COATED ORAL DAILY
Qty: 90 TABLET | Refills: 1 | Status: SHIPPED | OUTPATIENT
Start: 2020-07-07 | End: 2020-07-09 | Stop reason: SDUPTHER

## 2020-07-07 RX ORDER — LISINOPRIL 20 MG/1
20 TABLET ORAL DAILY
Qty: 90 TABLET | Refills: 1 | Status: SHIPPED | OUTPATIENT
Start: 2020-07-07 | End: 2020-07-09 | Stop reason: SDUPTHER

## 2020-07-07 RX ORDER — GLIMEPIRIDE 2 MG/1
TABLET ORAL
Qty: 90 TABLET | Refills: 1 | Status: SHIPPED | OUTPATIENT
Start: 2020-07-07 | End: 2020-07-09 | Stop reason: SDUPTHER

## 2020-07-07 RX ORDER — ATORVASTATIN CALCIUM 40 MG/1
TABLET, FILM COATED ORAL
Qty: 14 TABLET | Refills: 1 | Status: SHIPPED | OUTPATIENT
Start: 2020-07-07 | End: 2020-07-07 | Stop reason: SDUPTHER

## 2020-07-07 RX ORDER — GLIMEPIRIDE 4 MG/1
TABLET ORAL
Qty: 90 TABLET | Refills: 1 | Status: SHIPPED | OUTPATIENT
Start: 2020-07-07 | End: 2020-07-09 | Stop reason: SDUPTHER

## 2020-07-07 NOTE — TELEPHONE ENCOUNTER
Patient has an appointment for 7/09/2020 but is out of medication now she would like 90 days on all of them please.

## 2020-07-09 ENCOUNTER — OFFICE VISIT (OUTPATIENT)
Dept: FAMILY MEDICINE CLINIC | Age: 45
End: 2020-07-09
Payer: COMMERCIAL

## 2020-07-09 VITALS
HEART RATE: 100 BPM | SYSTOLIC BLOOD PRESSURE: 120 MMHG | WEIGHT: 180.8 LBS | DIASTOLIC BLOOD PRESSURE: 72 MMHG | BODY MASS INDEX: 31.03 KG/M2 | TEMPERATURE: 96.6 F | RESPIRATION RATE: 20 BRPM | OXYGEN SATURATION: 99 %

## 2020-07-09 LAB
A/G RATIO: 1.7 (ref 1.1–2.2)
ALBUMIN SERPL-MCNC: 3.8 G/DL (ref 3.4–5)
ALP BLD-CCNC: 55 U/L (ref 40–129)
ALT SERPL-CCNC: 17 U/L (ref 10–40)
ANION GAP SERPL CALCULATED.3IONS-SCNC: 13 MMOL/L (ref 3–16)
AST SERPL-CCNC: 15 U/L (ref 15–37)
BASOPHILS ABSOLUTE: 0.1 K/UL (ref 0–0.2)
BASOPHILS RELATIVE PERCENT: 0.7 %
BILIRUB SERPL-MCNC: 0.6 MG/DL (ref 0–1)
BUN BLDV-MCNC: 10 MG/DL (ref 7–20)
CALCIUM SERPL-MCNC: 8.7 MG/DL (ref 8.3–10.6)
CHLORIDE BLD-SCNC: 103 MMOL/L (ref 99–110)
CHOLESTEROL, TOTAL: 66 MG/DL (ref 0–199)
CO2: 24 MMOL/L (ref 21–32)
CREAT SERPL-MCNC: 1.3 MG/DL (ref 0.6–1.1)
CREATININE URINE POCT: 200
EOSINOPHILS ABSOLUTE: 0.2 K/UL (ref 0–0.6)
EOSINOPHILS RELATIVE PERCENT: 2.1 %
GFR AFRICAN AMERICAN: 54
GFR NON-AFRICAN AMERICAN: 44
GLOBULIN: 2.3 G/DL
GLUCOSE BLD-MCNC: 170 MG/DL (ref 70–99)
HBA1C MFR BLD: 7.3 %
HCT VFR BLD CALC: 36.1 % (ref 36–48)
HDLC SERPL-MCNC: 39 MG/DL (ref 40–60)
HEMOGLOBIN: 12 G/DL (ref 12–16)
LDL CHOLESTEROL CALCULATED: 13 MG/DL
LYMPHOCYTES ABSOLUTE: 2.8 K/UL (ref 1–5.1)
LYMPHOCYTES RELATIVE PERCENT: 33.3 %
MCH RBC QN AUTO: 31.7 PG (ref 26–34)
MCHC RBC AUTO-ENTMCNC: 33.3 G/DL (ref 31–36)
MCV RBC AUTO: 95.1 FL (ref 80–100)
MICROALBUMIN/CREAT 24H UR: 150 MG/G{CREAT}
MICROALBUMIN/CREAT UR-RTO: ABNORMAL
MONOCYTES ABSOLUTE: 0.7 K/UL (ref 0–1.3)
MONOCYTES RELATIVE PERCENT: 8.1 %
NEUTROPHILS ABSOLUTE: 4.7 K/UL (ref 1.7–7.7)
NEUTROPHILS RELATIVE PERCENT: 55.8 %
PDW BLD-RTO: 12.8 % (ref 12.4–15.4)
PLATELET # BLD: 298 K/UL (ref 135–450)
PMV BLD AUTO: 9.2 FL (ref 5–10.5)
POTASSIUM SERPL-SCNC: 4.3 MMOL/L (ref 3.5–5.1)
RBC # BLD: 3.8 M/UL (ref 4–5.2)
SODIUM BLD-SCNC: 140 MMOL/L (ref 136–145)
TOTAL PROTEIN: 6.1 G/DL (ref 6.4–8.2)
TRIGL SERPL-MCNC: 69 MG/DL (ref 0–150)
VLDLC SERPL CALC-MCNC: 14 MG/DL
WBC # BLD: 8.4 K/UL (ref 4–11)

## 2020-07-09 PROCEDURE — 99214 OFFICE O/P EST MOD 30 MIN: CPT | Performed by: NURSE PRACTITIONER

## 2020-07-09 PROCEDURE — 3051F HG A1C>EQUAL 7.0%<8.0%: CPT | Performed by: NURSE PRACTITIONER

## 2020-07-09 PROCEDURE — 83036 HEMOGLOBIN GLYCOSYLATED A1C: CPT | Performed by: NURSE PRACTITIONER

## 2020-07-09 PROCEDURE — 82044 UR ALBUMIN SEMIQUANTITATIVE: CPT | Performed by: NURSE PRACTITIONER

## 2020-07-09 RX ORDER — LISINOPRIL 20 MG/1
20 TABLET ORAL DAILY
Qty: 90 TABLET | Refills: 1 | Status: SHIPPED | OUTPATIENT
Start: 2020-07-09 | End: 2021-03-11 | Stop reason: SDUPTHER

## 2020-07-09 RX ORDER — GLIMEPIRIDE 4 MG/1
TABLET ORAL
Qty: 90 TABLET | Refills: 1 | Status: SHIPPED | OUTPATIENT
Start: 2020-07-09 | End: 2021-02-11

## 2020-07-09 RX ORDER — ATORVASTATIN CALCIUM 40 MG/1
40 TABLET, FILM COATED ORAL DAILY
Qty: 90 TABLET | Refills: 1 | Status: SHIPPED | OUTPATIENT
Start: 2020-07-09 | End: 2021-03-11 | Stop reason: SDUPTHER

## 2020-07-09 RX ORDER — GLIMEPIRIDE 2 MG/1
TABLET ORAL
Qty: 90 TABLET | Refills: 1 | Status: SHIPPED | OUTPATIENT
Start: 2020-07-09 | End: 2021-01-18

## 2020-07-09 ASSESSMENT — PATIENT HEALTH QUESTIONNAIRE - PHQ9
SUM OF ALL RESPONSES TO PHQ9 QUESTIONS 1 & 2: 0
SUM OF ALL RESPONSES TO PHQ QUESTIONS 1-9: 0
2. FEELING DOWN, DEPRESSED OR HOPELESS: 0
SUM OF ALL RESPONSES TO PHQ QUESTIONS 1-9: 0
1. LITTLE INTEREST OR PLEASURE IN DOING THINGS: 0

## 2020-07-09 ASSESSMENT — ENCOUNTER SYMPTOMS
DIARRHEA: 0
VOMITING: 0
NAUSEA: 0
ABDOMINAL PAIN: 0
CONSTIPATION: 0
WHEEZING: 0
COUGH: 0
SHORTNESS OF BREATH: 0
CHEST TIGHTNESS: 0

## 2020-07-09 NOTE — PROGRESS NOTES
SUBJECTIVE:    Xin Monteiro  1975  39 y.o.  female      Chief Complaint   Patient presents with    Annual Exam     Pt here for one year follow up    Medication Refill     Pt would like all Rx's back to 90 day supplies     HPI    Allergies   Allergen Reactions    Pravachol [Pravastatin] Nausea Only       Current Outpatient Medications   Medication Sig Dispense Refill    metFORMIN (GLUCOPHAGE) 500 MG tablet Take 2 tablets by mouth 2 times daily (with meals) 360 tablet 1    lisinopril (PRINIVIL;ZESTRIL) 20 MG tablet Take 1 tablet by mouth daily 90 tablet 1    glimepiride (AMARYL) 4 MG tablet TAKE 1 TABLET BY MOUTH ONCE DAILY IN THE MORNING BEFORE  BREAKFAST  WITH  THE  2  MG  TABLET  AS  DIRECTED 90 tablet 1    glimepiride (AMARYL) 2 MG tablet TAKE 1 TABLET BY MOUTH IN THE MORNING WITH  4MG  TAB  AS  DIRECTED 90 tablet 1    atorvastatin (LIPITOR) 40 MG tablet Take 1 tablet by mouth daily 90 tablet 1    Levonorgest-Eth Estrad 91-Day (CAMRESE LO PO) Take by mouth nightly       No current facility-administered medications for this visit. Past Medical History:   Diagnosis Date    Abnormal uterine bleeding     Cough     Occ.  Productive Cough Clear Sputum    Diabetes mellitus ([facility] [address] Dx 2006 Or 2007    Fibroids     Hypertension     \"on medication for high blood pressure of 6- 7 yrs\"    Sinus problem     Teeth missing     Upper And Lower    Viral gastroenteritis     [place] teeth extracted     One [place] Tooth Extracted In Past     Past Surgical History:   Procedure Laterality Date    CHOLECYSTECTOMY, LAPAROSCOPIC  7-7-15    COLONOSCOPY  2000's    ENDOMETRIAL ABLATION  2012    Tubal Ligation Also Done    HYSTERECTOMY  4/19/16    supracervical ABD hysterectomy/ repair of serosal tear of bowel    TUBAL LIGATION  2012    Done With Endometrial Ablation    WISDOM TOOTH EXTRACTION      One [place] Tooth Extracted In Past     Social History     Socioeconomic History    Marital status:      Spouse name: None    Number of children: None    Years of education: None    Highest education level: None   Occupational History    None   Social Needs    Financial resource strain: None    Food insecurity     Worry: None     Inability: None    Transportation needs     Medical: None     Non-medical: None   Tobacco Use    Smoking status: Current Some Day Smoker     Packs/day: 0.00     Years: 16.00     Pack years: 0.00     Start date: 4/15/2000    Smokeless tobacco: Never Used    Tobacco comment: \"I Smoke Black And Mild, They Are Like Little Cigars, Only Smoke Occ\"   Substance and Sexual Activity    Alcohol use: Yes     Comment: \"Occ. A Couple Times A Week\"    Drug use: No    Sexual activity: Yes     Partners: Male   Lifestyle    Physical activity     Days per week: None     Minutes per session: None    Stress: None   Relationships    Social connections     Talks on phone: None     Gets together: None     Attends L[employer] service: None     Active member of club or organization: None     Attends meetings of clubs or organizations: None     Relationship status: None    Intimate partner violence     Fear of current or ex partner: None     Emotionally abused: None     Physically abused: None     Forced sexual activity: None   Other Topics Concern    None   Social History Narrative    None         Type 2 diabetes mellitus without complication ([facility] [address]  Checking glucose daily. Ranges from 115-150. Denies numbness. Occasional tingling in legs. Denies polyuria, polydipsia, polyphagia. Essential hypertension  Stable. Patient denies any exertional chest pain, dyspnea, palpitations, syncope, orthopnea, edema or paroxysmal nocturnal dyspnea. Abnormal uterine bleeding (AUB)  Followed by GYN. On oral contraceptives. Review of Systems   Constitutional: Negative for appetite change, chills, fatigue and unexpected weight change.    Respiratory: Negative for cough, chest tightness, shortness of breath and wheezing. Cardiovascular: Negative for chest pain, palpitations and leg swelling. Gastrointestinal: Negative for abdominal pain, constipation, diarrhea, nausea and vomiting. Musculoskeletal: Negative for arthralgias. Neurological: Negative for weakness, numbness and headaches. Hematological: Negative for adenopathy. OBJECTIVE:    /72 (Site: Left Upper Arm, Position: Sitting, Cuff Size: Medium Adult)   Pulse 100   Temp 96.6 °F (35.9 °C) (Infrared)   Resp 20   Wt 180 lb 12.8 oz (82 kg)   LMP  (LMP Unknown)   SpO2 99%   BMI 31.03 kg/m²     Physical Exam  Constitutional:       Appearance: Normal appearance. She is well-developed. HENT:      Head: Normocephalic and atraumatic. Right Ear: Hearing normal.      Left Ear: Hearing normal.      Nose: Nose normal.      Mouth/Throat:      Mouth: Mucous membranes are moist.      Pharynx: Oropharynx is clear. Eyes:      Extraocular Movements: Extraocular movements intact. Conjunctiva/sclera: Conjunctivae normal.      Pupils: Pupils are equal, round, and reactive to light. Neck:      Musculoskeletal: Normal range of motion and neck supple. Vascular: No carotid bruit or JVD. Cardiovascular:      Rate and Rhythm: Normal rate and regular rhythm. Pulses:           Dorsalis pedis pulses are 2+ on the right side and 2+ on the left side. Posterior tibial pulses are 2+ on the right side and 2+ on the left side. Heart sounds: Normal heart sounds. No murmur. No friction rub. No gallop. Pulmonary:      Effort: Pulmonary effort is normal. No respiratory distress. Breath sounds: Normal breath sounds. No wheezing, rhonchi or rales. Abdominal:      General: Bowel sounds are normal. There is no distension. Palpations: Abdomen is soft. Tenderness: There is no abdominal tenderness. There is no guarding. Musculoskeletal: Normal range of motion.    Feet:      Right foot:      Protective Sensation: 10 sites tested. 10 sites sensed. Skin integrity: Skin integrity normal.      Left foot:      Protective Sensation: 10 sites tested. 10 sites sensed. Skin integrity: Skin integrity normal.   Skin:     General: Skin is warm and dry. Neurological:      General: No focal deficit present. Mental Status: She is alert and oriented to person, place, and time. Psychiatric:         Mood and Affect: Mood normal.         Behavior: Behavior normal. Behavior is cooperative. Thought Content: Thought content normal.         ASSESSMENT/PLAN:    1. Type 2 diabetes mellitus without complication, without long-term current use of insulin ([facility])  Monitor glucose. Continue current medication. The patient is asked to make an attempt to improve diet and exercise patterns to aid in medical management of this problem. - POCT glycosylated hemoglobin (Hb A1C)  - metFORMIN (GLUCOPHAGE) 500 MG tablet; Take 2 tablets by mouth 2 times daily (with meals)  Dispense: 360 tablet; Refill: 1  - lisinopril (PRINIVIL;ZESTRIL) 20 MG tablet; Take 1 tablet by mouth daily  Dispense: 90 tablet; Refill: 1  - glimepiride (AMARYL) 4 MG tablet; TAKE 1 TABLET BY MOUTH ONCE DAILY IN THE MORNING BEFORE  BREAKFAST  WITH  THE  2  MG  TABLET  AS  DIRECTED  Dispense: 90 tablet; Refill: 1  - glimepiride (AMARYL) 2 MG tablet; TAKE 1 TABLET BY MOUTH IN THE MORNING WITH  4MG  TAB  AS  DIRECTED  Dispense: 90 tablet; Refill: 1  - atorvastatin (LIPITOR) 40 MG tablet; Take 1 tablet by mouth daily  Dispense: 90 tablet; Refill: 1  - Comprehensive Metabolic Panel; Future  - Lipid Panel; Future  -  DIABETES FOOT EXAM    2. Essential hypertension  Continue current medication  - CBC Auto Differential; Future  - Comprehensive Metabolic Panel; Future    3. Abnormal uterine bleeding (AUB)  Follow up with GYN as scheduled           Return in about 6 months (around 1/9/2021).       (Please note that portions of this note may have been completed with a voice recognition program. Efforts were made to edit the dictations but occasionally words aremis-transcribed.)

## 2020-07-09 NOTE — ASSESSMENT & PLAN NOTE
Checking glucose daily. Ranges from 115-150. Denies numbness. Occasional tingling in legs. Denies polyuria, polydipsia, polyphagia.

## 2020-07-14 DIAGNOSIS — R89.9 ABNORMAL LABORATORY TEST RESULT: ICD-10-CM

## 2020-07-14 LAB
ALBUMIN SERPL-MCNC: 3.9 G/DL (ref 3.4–5)
ANION GAP SERPL CALCULATED.3IONS-SCNC: 16 MMOL/L (ref 3–16)
BUN BLDV-MCNC: 13 MG/DL (ref 7–20)
CALCIUM SERPL-MCNC: 8.8 MG/DL (ref 8.3–10.6)
CHLORIDE BLD-SCNC: 104 MMOL/L (ref 99–110)
CO2: 20 MMOL/L (ref 21–32)
CREAT SERPL-MCNC: 1.3 MG/DL (ref 0.6–1.1)
GFR AFRICAN AMERICAN: 54
GFR NON-AFRICAN AMERICAN: 44
GLUCOSE BLD-MCNC: 158 MG/DL (ref 70–99)
PHOSPHORUS: 3.4 MG/DL (ref 2.5–4.9)
POTASSIUM SERPL-SCNC: 4.4 MMOL/L (ref 3.5–5.1)
SODIUM BLD-SCNC: 140 MMOL/L (ref 136–145)

## 2020-12-21 ENCOUNTER — TELEPHONE (OUTPATIENT)
Dept: FAMILY MEDICINE CLINIC | Age: 45
End: 2020-12-21

## 2020-12-21 NOTE — TELEPHONE ENCOUNTER
Patient called and lm on MA vm-she is wanting to know if it would be ok to get the covid vaccine-she works for the [employer]-they are wanting them to schedule but she wanted to get your opinion if she should or not-please advise

## 2020-12-21 NOTE — TELEPHONE ENCOUNTER
I recommend she weight the pros and cons of getting the vaccine vs getting the actual virus. [facility] website has a lot of information regarding the vaccines to help people make informed decisions. If she would like to discuss further with me we can schedule an appointment. Thank you.

## 2021-01-15 DIAGNOSIS — E11.9 TYPE 2 DIABETES MELLITUS WITHOUT COMPLICATION, WITHOUT LONG-TERM CURRENT USE OF INSULIN (HCC): ICD-10-CM

## 2021-01-18 RX ORDER — GLIMEPIRIDE 2 MG/1
TABLET ORAL
Qty: 90 TABLET | Refills: 0 | Status: SHIPPED | OUTPATIENT
Start: 2021-01-18 | End: 2021-03-11 | Stop reason: ALTCHOICE

## 2021-02-16 LAB — DIABETIC RETINOPATHY: POSITIVE

## 2021-03-11 ENCOUNTER — OFFICE VISIT (OUTPATIENT)
Dept: FAMILY MEDICINE CLINIC | Age: 46
End: 2021-03-11
Payer: COMMERCIAL

## 2021-03-11 VITALS
BODY MASS INDEX: 30.21 KG/M2 | HEART RATE: 100 BPM | RESPIRATION RATE: 18 BRPM | WEIGHT: 176 LBS | SYSTOLIC BLOOD PRESSURE: 162 MMHG | TEMPERATURE: 97.4 F | OXYGEN SATURATION: 97 % | DIASTOLIC BLOOD PRESSURE: 90 MMHG

## 2021-03-11 DIAGNOSIS — E11.9 TYPE 2 DIABETES MELLITUS WITHOUT COMPLICATION, WITHOUT LONG-TERM CURRENT USE OF INSULIN (HCC): Primary | ICD-10-CM

## 2021-03-11 DIAGNOSIS — I10 ESSENTIAL HYPERTENSION: ICD-10-CM

## 2021-03-11 DIAGNOSIS — F41.9 ANXIETY: ICD-10-CM

## 2021-03-11 LAB — HBA1C MFR BLD: 6.2 %

## 2021-03-11 PROCEDURE — 99214 OFFICE O/P EST MOD 30 MIN: CPT | Performed by: NURSE PRACTITIONER

## 2021-03-11 PROCEDURE — 83036 HEMOGLOBIN GLYCOSYLATED A1C: CPT | Performed by: NURSE PRACTITIONER

## 2021-03-11 RX ORDER — FLUOXETINE 10 MG/1
10 CAPSULE ORAL DAILY
Qty: 90 CAPSULE | Refills: 1 | Status: SHIPPED | OUTPATIENT
Start: 2021-03-11 | End: 2021-09-13

## 2021-03-11 RX ORDER — GLIMEPIRIDE 2 MG/1
TABLET ORAL
Qty: 90 TABLET | Refills: 0 | Status: CANCELLED | OUTPATIENT
Start: 2021-03-11

## 2021-03-11 RX ORDER — BUSPIRONE HYDROCHLORIDE 7.5 MG/1
7.5 TABLET ORAL 2 TIMES DAILY
Qty: 180 TABLET | Refills: 1 | Status: SHIPPED | OUTPATIENT
Start: 2021-03-11 | End: 2021-04-10

## 2021-03-11 RX ORDER — LISINOPRIL 20 MG/1
20 TABLET ORAL DAILY
Qty: 90 TABLET | Refills: 1 | Status: SHIPPED | OUTPATIENT
Start: 2021-03-11 | End: 2021-07-26 | Stop reason: SDUPTHER

## 2021-03-11 RX ORDER — AMLODIPINE BESYLATE 5 MG/1
5 TABLET ORAL DAILY
Qty: 90 TABLET | Refills: 1 | Status: SHIPPED | OUTPATIENT
Start: 2021-03-11 | End: 2021-10-06

## 2021-03-11 RX ORDER — GLIMEPIRIDE 4 MG/1
4 TABLET ORAL
Qty: 90 TABLET | Refills: 1 | Status: SHIPPED | OUTPATIENT
Start: 2021-03-11 | End: 2021-04-12

## 2021-03-11 RX ORDER — ATORVASTATIN CALCIUM 40 MG/1
40 TABLET, FILM COATED ORAL DAILY
Qty: 90 TABLET | Refills: 1 | Status: SHIPPED | OUTPATIENT
Start: 2021-03-11 | End: 2022-01-07

## 2021-03-11 ASSESSMENT — ENCOUNTER SYMPTOMS
SHORTNESS OF BREATH: 0
COUGH: 0
DIARRHEA: 0
ABDOMINAL PAIN: 0
WHEEZING: 0
CHEST TIGHTNESS: 0
NAUSEA: 0
VOMITING: 0
CONSTIPATION: 0

## 2021-03-11 ASSESSMENT — PATIENT HEALTH QUESTIONNAIRE - PHQ9
1. LITTLE INTEREST OR PLEASURE IN DOING THINGS: 0
SUM OF ALL RESPONSES TO PHQ QUESTIONS 1-9: 0
SUM OF ALL RESPONSES TO PHQ QUESTIONS 1-9: 0

## 2021-03-11 ASSESSMENT — ANXIETY QUESTIONNAIRES
GAD7 TOTAL SCORE: 17
3. WORRYING TOO MUCH ABOUT DIFFERENT THINGS: 3-NEARLY EVERY DAY
5. BEING SO RESTLESS THAT IT IS HARD TO SIT STILL: 0-NOT AT ALL

## 2021-03-11 NOTE — ASSESSMENT & PLAN NOTE
Elevated. Increased stress. Patient denies any exertional chest pain, dyspnea, palpitations, syncope, orthopnea, edema or paroxysmal nocturnal dyspnea.

## 2021-03-11 NOTE — PROGRESS NOTES
SUBJECTIVE:    Edwin Romp  1975  39 y.o.  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    Stress    Anxiety    Medication Refill     she would like 90 dy rx's     HPI       Allergies   Allergen Reactions    Pravachol [Pravastatin] Nausea Only       Current Outpatient Medications   Medication Sig Dispense Refill    metFORMIN (GLUCOPHAGE) 500 MG tablet Take 2 tablets by mouth 2 times daily (with meals) 360 tablet 1    lisinopril (PRINIVIL;ZESTRIL) 20 MG tablet Take 1 tablet by mouth daily 90 tablet 1    glimepiride (AMARYL) 4 MG tablet Take 1 tablet by mouth every morning (before breakfast) 90 tablet 1    atorvastatin (LIPITOR) 40 MG tablet Take 1 tablet by mouth daily 90 tablet 1    amLODIPine (NORVASC) 5 MG tablet Take 1 tablet by mouth daily 90 tablet 1    FLUoxetine (PROZAC) 10 MG capsule Take 1 capsule by mouth daily 90 capsule 1    busPIRone (BUSPAR) 7.5 MG tablet Take 1 tablet by mouth 2 times daily 180 tablet 1    Levonorgest-Eth Estrad 91-Day (CAMRESE LO PO) Take by mouth nightly       No current facility-administered medications for this visit. Past Medical History:   Diagnosis Date    Abnormal uterine bleeding     Cough     Occ.  Productive Cough Clear Sputum    Diabetes mellitus ([address] Dx  Or     Fibroids     Hypertension     \"on medication for high blood pressure of 6- 7 yrs\"    Sinus problem     Teeth missing     Upper And Lower    Viral gastroenteritis     [place] teeth extracted     One [place] Tooth Extracted In Past     Past Surgical History:   Procedure Laterality Date    CHOLECYSTECTOMY, LAPAROSCOPIC  7-7-15    COLONOSCOPY      ENDOMETRIAL ABLATION      Tubal Ligation Also Done    HYSTERECTOMY  16    supracervical ABD hysterectomy/ repair of serosal tear of bowel    TUBAL numbness or tingling. No polyuria, polydipsia, polyphagia. Essential hypertension  Elevated. Increased stress. Patient denies any exertional chest pain, dyspnea, palpitations, syncope, orthopnea, edema or paroxysmal nocturnal dyspnea. Review of Systems   Constitutional: Negative for appetite change, chills, fatigue and unexpected weight change. Respiratory: Negative for cough, chest tightness, shortness of breath and wheezing. Cardiovascular: Negative for chest pain, palpitations and leg swelling. Gastrointestinal: Negative for abdominal pain, constipation, diarrhea, nausea and vomiting. Musculoskeletal: Negative for arthralgias. Neurological: Negative for weakness, numbness and headaches. Hematological: Negative for adenopathy. Psychiatric/Behavioral: Positive for sleep disturbance. Negative for dysphoric mood and suicidal ideas. The patient is nervous/anxious. OBJECTIVE:    BP (!) 162/90 (Site: Right Upper Arm, Position: Sitting, Cuff Size: Medium Adult)   Pulse 100   Temp 97.4 °F (36.3 °C)   Resp 18   Wt 176 lb (79.8 kg)   LMP  (LMP Unknown)   SpO2 97%   BMI 30.21 kg/m²     Physical Exam  Constitutional:       Appearance: Normal appearance. She is well-developed. HENT:      Head: Normocephalic and atraumatic. Right Ear: Hearing normal.      Left Ear: Hearing normal.   Neck:      Musculoskeletal: Normal range of motion and neck supple. Vascular: No carotid bruit or JVD. Cardiovascular:      Rate and Rhythm: Normal rate and regular rhythm. Heart sounds: Normal heart sounds. No murmur. No friction rub. No gallop. Pulmonary:      Effort: Pulmonary effort is normal. No respiratory distress. Breath sounds: Normal breath sounds. No wheezing, rhonchi or rales. Abdominal:      Palpations: Abdomen is soft. Musculoskeletal: Normal range of motion. Skin:     General: Skin is warm and dry. Neurological:      General: No focal deficit present. Mental Status: She is alert and oriented to person, place, and time. Psychiatric:         Mood and Affect: Mood is anxious. Mood is not depressed. Speech: Speech normal.         Behavior: Behavior normal. Behavior is cooperative. Thought Content: Thought content normal. Thought content does not include homicidal or suicidal ideation. Thought content does not include homicidal or suicidal plan. ASSESSMENT/PLAN:    1. Type 2 diabetes mellitus without complication, without long-term current use of insulin (HCC)  A1c improved. amaryl decreased. Monitor glucose. Notify with any hypoglycemic episodes. - POCT glycosylated hemoglobin (Hb A1C)  - metFORMIN (GLUCOPHAGE) 500 MG tablet; Take 2 tablets by mouth 2 times daily (with meals)  Dispense: 360 tablet; Refill: 1  - lisinopril (PRINIVIL;ZESTRIL) 20 MG tablet; Take 1 tablet by mouth daily  Dispense: 90 tablet; Refill: 1  - glimepiride (AMARYL) 4 MG tablet; Take 1 tablet by mouth every morning (before breakfast)  Dispense: 90 tablet; Refill: 1  - atorvastatin (LIPITOR) 40 MG tablet; Take 1 tablet by mouth daily  Dispense: 90 tablet; Refill: 1    2. Essential hypertension  Start norvasc. Monitor BP. Follow up in 2-4 weeks  - amLODIPine (NORVASC) 5 MG tablet; Take 1 tablet by mouth daily  Dispense: 90 tablet; Refill: 1    3. Anxiety  Start prozac and buspar. Sleep hygiene, mindfulness/meditation  - FLUoxetine (PROZAC) 10 MG capsule; Take 1 capsule by mouth daily  Dispense: 90 capsule; Refill: 1  - busPIRone (BUSPAR) 7.5 MG tablet; Take 1 tablet by mouth 2 times daily  Dispense: 180 tablet; Refill: 1               Return in about 2 weeks (around 3/25/2021).       (Please note that portions of this note may have been completed with a voice recognition program. Efforts were made to edit the dictations but occasionally words aremis-transcribed.)

## 2021-04-12 ENCOUNTER — OFFICE VISIT (OUTPATIENT)
Dept: FAMILY MEDICINE CLINIC | Age: 46
End: 2021-04-12
Payer: COMMERCIAL

## 2021-04-12 VITALS
DIASTOLIC BLOOD PRESSURE: 70 MMHG | SYSTOLIC BLOOD PRESSURE: 132 MMHG | HEART RATE: 68 BPM | TEMPERATURE: 98.2 F | RESPIRATION RATE: 18 BRPM | WEIGHT: 172 LBS | OXYGEN SATURATION: 98 % | BODY MASS INDEX: 29.52 KG/M2

## 2021-04-12 DIAGNOSIS — E11.9 TYPE 2 DIABETES MELLITUS WITHOUT COMPLICATION, WITHOUT LONG-TERM CURRENT USE OF INSULIN (HCC): ICD-10-CM

## 2021-04-12 DIAGNOSIS — F41.9 ANXIETY: ICD-10-CM

## 2021-04-12 DIAGNOSIS — I10 ESSENTIAL HYPERTENSION: ICD-10-CM

## 2021-04-12 PROCEDURE — 99213 OFFICE O/P EST LOW 20 MIN: CPT | Performed by: NURSE PRACTITIONER

## 2021-04-12 RX ORDER — GLIMEPIRIDE 2 MG/1
2 TABLET ORAL
Qty: 90 TABLET | Refills: 1 | Status: SHIPPED | OUTPATIENT
Start: 2021-04-12 | End: 2021-07-12 | Stop reason: ALTCHOICE

## 2021-04-12 ASSESSMENT — ENCOUNTER SYMPTOMS
CHEST TIGHTNESS: 0
COUGH: 0
DIARRHEA: 0
WHEEZING: 0
NAUSEA: 0
CONSTIPATION: 0
ABDOMINAL PAIN: 0
SHORTNESS OF BREATH: 0
VOMITING: 0

## 2021-04-12 ASSESSMENT — ANXIETY QUESTIONNAIRES
4. TROUBLE RELAXING: 0-NOT AT ALL
3. WORRYING TOO MUCH ABOUT DIFFERENT THINGS: 1-SEVERAL DAYS
7. FEELING AFRAID AS IF SOMETHING AWFUL MIGHT HAPPEN: 1-SEVERAL DAYS
2. NOT BEING ABLE TO STOP OR CONTROL WORRYING: 0-NOT AT ALL

## 2021-04-12 NOTE — ASSESSMENT & PLAN NOTE
buspar made her heart race. She does feel anxiety has improved. 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 Sleeping well   RAMOS 7 SCORE 4/12/2021 3/11/2021   RAMOS-7 Total Score 4 17     Interpretation of RAMOS-7 score: 5-9 = mild anxiety, 10-14 = moderate anxiety, 15+ = severe anxiety. Recommend referral to behavioral health for scores 10 or greater.

## 2021-04-12 NOTE — PROGRESS NOTES
SUBJECTIVE:    Bob Jacksonn  1975  39 y.o.  female      Chief Complaint   Patient presents with    Follow-up     4 wk f/u    Diabetes    Anxiety     buspar gave patient side effects-not taking     HPI    Allergies   Allergen Reactions    Pravachol [Pravastatin] Nausea Only       Current Outpatient Medications   Medication Sig Dispense Refill    glimepiride (AMARYL) 2 MG tablet Take 1 tablet by mouth every morning (before breakfast) 90 tablet 1    metFORMIN (GLUCOPHAGE) 500 MG tablet Take 2 tablets by mouth 2 times daily (with meals) 360 tablet 1    lisinopril (PRINIVIL;ZESTRIL) 20 MG tablet Take 1 tablet by mouth daily 90 tablet 1    atorvastatin (LIPITOR) 40 MG tablet Take 1 tablet by mouth daily 90 tablet 1    amLODIPine (NORVASC) 5 MG tablet Take 1 tablet by mouth daily 90 tablet 1    FLUoxetine (PROZAC) 10 MG capsule Take 1 capsule by mouth daily 90 capsule 1    Levonorgest-Eth Estrad 91-Day (CAMRESE LO PO) Take by mouth nightly       No current facility-administered medications for this visit. Past Medical History:   Diagnosis Date    Abnormal uterine bleeding     Cough     Occ.  Productive Cough Clear Sputum    Diabetes mellitus ([address] Dx 2006 Or 2007    Fibroids     Hypertension     \"on medication for high blood pressure of 6- 7 yrs\"    Sinus problem     Teeth missing     Upper And Lower    Viral gastroenteritis     [place] teeth extracted     One [place] Tooth Extracted In Past     Past Surgical History:   Procedure Laterality Date    CHOLECYSTECTOMY, LAPAROSCOPIC  7-7-15    COLONOSCOPY  2000's    ENDOMETRIAL ABLATION  2012    Tubal Ligation Also Done    HYSTERECTOMY  4/19/16    supracervical ABD hysterectomy/ repair of serosal tear of bowel    TUBAL LIGATION  2012    Done With Endometrial Ablation    WISDOM TOOTH EXTRACTION      One [place] Tooth Extracted In Past     Social History     Socioeconomic History    Marital status:      Spouse name: None    Number of children: None    Years of education: None    Highest education level: None   Occupational History    None   Social Needs    Financial resource strain: None    Food insecurity     Worry: None     Inability: None    Transportation needs     Medical: None     Non-medical: None   Tobacco Use    Smoking status: Light Tobacco Smoker     Packs/day: 0.00     Years: 16.00     Pack years: 0.00     Start date: 4/15/2000    Smokeless tobacco: Never Used    Tobacco comment: \"I Smoke Black And Mild, They Are Like Little Cigars, Only Smoke Occ\"   Substance and Sexual Activity    Alcohol use: Yes     Comment: \"Occ. A Couple Times A Week\"    Drug use: No    Sexual activity: Yes     Partners: Male   Lifestyle    Physical activity     Days per week: None     Minutes per session: None    Stress: None   Relationships    Social connections     Talks on phone: None     Gets together: None     Attends Congregation service: None     Active member of club or organization: None     Attends meetings of clubs or organizations: None     Relationship status: None    Intimate partner violence     Fear of current or ex partner: None     Emotionally abused: None     Physically abused: None     Forced sexual activity: None   Other Topics Concern    None   Social History Narrative    None         Essential hypertension  BP improved. Checking at home and ranging 130-140/70-80. Trisha Maid Patient denies any exertional chest pain, dyspnea, palpitations, syncope, orthopnea, edema or paroxysmal nocturnal dyspnea. Type 2 diabetes mellitus without complication (HCC)  Checking in the evenings and ranging 115-130. amaryl decreased at last appointment. Hypoglycemic episodes 1-2x/ week instead of every day. Anxiety  buspar made her heart race. She does feel anxiety has improved. 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  Sleeping well   RAMOS 7 SCORE 4/12/2021 3/11/2021   RAMOS-7 Total Score 4 17 Interpretation of RAMOS-7 score: 5-9 = mild anxiety, 10-14 = moderate anxiety, 15+ = severe anxiety. Recommend referral to behavioral health for scores 10 or greater. Review of Systems   Constitutional: Negative for appetite change, chills, fatigue and unexpected weight change. Respiratory: Negative for cough, chest tightness, shortness of breath and wheezing. Cardiovascular: Negative for chest pain, palpitations and leg swelling. Gastrointestinal: Negative for abdominal pain, constipation, diarrhea, nausea and vomiting. Musculoskeletal: Negative for arthralgias. Neurological: Negative for weakness, numbness and headaches. Hematological: Negative for adenopathy. Psychiatric/Behavioral: Negative for dysphoric mood, sleep disturbance and suicidal ideas. The patient is nervous/anxious. OBJECTIVE:    /70 (Site: Left Upper Arm, Position: Sitting, Cuff Size: Large Adult)   Pulse 68   Temp 98.2 °F (36.8 °C)   Resp 18   Wt 172 lb (78 kg)   LMP  (LMP Unknown)   SpO2 98%   BMI 29.52 kg/m²     Physical Exam  Constitutional:       Appearance: Normal appearance. She is well-developed. HENT:      Head: Normocephalic and atraumatic. Right Ear: Hearing normal.      Left Ear: Hearing normal.   Neck:      Musculoskeletal: Normal range of motion and neck supple. Vascular: No carotid bruit or JVD. Cardiovascular:      Rate and Rhythm: Normal rate and regular rhythm. Heart sounds: Normal heart sounds. No murmur. No friction rub. No gallop. Pulmonary:      Effort: Pulmonary effort is normal. No respiratory distress. Breath sounds: Normal breath sounds. No wheezing, rhonchi or rales. Abdominal:      Palpations: Abdomen is soft. Musculoskeletal: Normal range of motion. Skin:     General: Skin is warm and dry. Neurological:      General: No focal deficit present. Mental Status: She is alert and oriented to person, place, and time.    Psychiatric:         Mood and Affect: Mood is not anxious or depressed. Speech: Speech normal.         Behavior: Behavior normal. Behavior is cooperative. Thought Content: Thought content normal. Thought content does not include homicidal or suicidal ideation. Thought content does not include homicidal or suicidal plan. ASSESSMENT/PLAN:    1. Essential hypertension  Improved. DASH diet, monitor BP    2. Type 2 diabetes mellitus without complication, without long-term current use of insulin ([facility])  Decrease amaryl. Monitor glucose  - glimepiride (AMARYL) 2 MG tablet; Take 1 tablet by mouth every morning (before breakfast)  Dispense: 90 tablet; Refill: 1    3. Anxiety  Continue prozac. Return in about 3 months (around 7/12/2021).       (Please note that portions of this note may have been completed with a voice recognition program. Efforts were made to edit the dictations but occasionally words aremis-transcribed.)

## 2021-07-12 ENCOUNTER — OFFICE VISIT (OUTPATIENT)
Dept: FAMILY MEDICINE CLINIC | Age: 46
End: 2021-07-12
Payer: COMMERCIAL

## 2021-07-12 VITALS
WEIGHT: 169 LBS | DIASTOLIC BLOOD PRESSURE: 62 MMHG | BODY MASS INDEX: 29.01 KG/M2 | TEMPERATURE: 97.9 F | HEART RATE: 98 BPM | SYSTOLIC BLOOD PRESSURE: 112 MMHG | RESPIRATION RATE: 18 BRPM

## 2021-07-12 DIAGNOSIS — Z12.11 COLON CANCER SCREENING: ICD-10-CM

## 2021-07-12 DIAGNOSIS — I10 ESSENTIAL HYPERTENSION: ICD-10-CM

## 2021-07-12 DIAGNOSIS — F41.9 ANXIETY: ICD-10-CM

## 2021-07-12 DIAGNOSIS — Z13.220 SCREENING FOR HYPERLIPIDEMIA: ICD-10-CM

## 2021-07-12 DIAGNOSIS — E11.9 TYPE 2 DIABETES MELLITUS WITHOUT COMPLICATION, WITHOUT LONG-TERM CURRENT USE OF INSULIN (HCC): ICD-10-CM

## 2021-07-12 DIAGNOSIS — Z13.220 SCREENING FOR HYPERLIPIDEMIA: Primary | ICD-10-CM

## 2021-07-12 LAB
BASOPHILS ABSOLUTE: 0.1 K/UL (ref 0–0.2)
BASOPHILS RELATIVE PERCENT: 0.6 %
EOSINOPHILS ABSOLUTE: 0.3 K/UL (ref 0–0.6)
EOSINOPHILS RELATIVE PERCENT: 2.9 %
HBA1C MFR BLD: 6.5 %
HCT VFR BLD CALC: 33.8 % (ref 36–48)
HEMOGLOBIN: 11.2 G/DL (ref 12–16)
LYMPHOCYTES ABSOLUTE: 3.9 K/UL (ref 1–5.1)
LYMPHOCYTES RELATIVE PERCENT: 35.7 %
MCH RBC QN AUTO: 31.6 PG (ref 26–34)
MCHC RBC AUTO-ENTMCNC: 33.2 G/DL (ref 31–36)
MCV RBC AUTO: 95.3 FL (ref 80–100)
MONOCYTES ABSOLUTE: 1.2 K/UL (ref 0–1.3)
MONOCYTES RELATIVE PERCENT: 10.5 %
NEUTROPHILS ABSOLUTE: 5.5 K/UL (ref 1.7–7.7)
NEUTROPHILS RELATIVE PERCENT: 50.3 %
PDW BLD-RTO: 13.6 % (ref 12.4–15.4)
PLATELET # BLD: 301 K/UL (ref 135–450)
PMV BLD AUTO: 9.1 FL (ref 5–10.5)
RBC # BLD: 3.54 M/UL (ref 4–5.2)
WBC # BLD: 11 K/UL (ref 4–11)

## 2021-07-12 PROCEDURE — 99214 OFFICE O/P EST MOD 30 MIN: CPT | Performed by: NURSE PRACTITIONER

## 2021-07-12 PROCEDURE — 83036 HEMOGLOBIN GLYCOSYLATED A1C: CPT | Performed by: NURSE PRACTITIONER

## 2021-07-12 RX ORDER — BLOOD-GLUCOSE METER
1 KIT MISCELLANEOUS DAILY
Qty: 1 KIT | Refills: 0 | Status: SHIPPED | OUTPATIENT
Start: 2021-07-12 | End: 2021-08-02

## 2021-07-12 RX ORDER — LANCETS 30 GAUGE
1 EACH MISCELLANEOUS DAILY
Qty: 100 EACH | Refills: 3 | Status: SHIPPED | OUTPATIENT
Start: 2021-07-12

## 2021-07-12 RX ORDER — GLUCOSAMINE HCL/CHONDROITIN SU 500-400 MG
CAPSULE ORAL
Qty: 100 STRIP | Refills: 3 | Status: SHIPPED | OUTPATIENT
Start: 2021-07-12

## 2021-07-12 ASSESSMENT — ENCOUNTER SYMPTOMS
VOMITING: 0
DIARRHEA: 0
CONSTIPATION: 0
NAUSEA: 0
CHEST TIGHTNESS: 0
WHEEZING: 0
ABDOMINAL PAIN: 0
SHORTNESS OF BREATH: 0
COUGH: 0

## 2021-07-12 NOTE — ASSESSMENT & PLAN NOTE
A1c down from last check. She has continued to cut out pop and decrease portions. No formal exercise. No hypoglycemic episodes. No polyuria, polydipsia, polyphagia.

## 2021-07-12 NOTE — PROGRESS NOTES
SUBJECTIVE:    Eva Laughlni  1975  55 y.o.  female      Chief Complaint   Patient presents with    3 Month Follow-Up    Hypertension    Diabetes     HPI     Allergies   Allergen Reactions    Pravachol [Pravastatin] Nausea Only       Current Outpatient Medications   Medication Sig Dispense Refill    glucose monitoring (FREESTYLE FREEDOM) kit 1 kit by Does not apply route daily 1 kit 0    blood glucose monitor strips Test one time a day & as needed for symptoms of irregular blood glucose. Dispense sufficient amount for indicated testing frequency plus additional to accommodate PRN testing needs. 100 strip 3    Lancets MISC 1 each by Does not apply route daily 100 each 3    metFORMIN (GLUCOPHAGE) 500 MG tablet Take 2 tablets by mouth 2 times daily (with meals) 360 tablet 1    lisinopril (PRINIVIL;ZESTRIL) 20 MG tablet Take 1 tablet by mouth daily 90 tablet 1    atorvastatin (LIPITOR) 40 MG tablet Take 1 tablet by mouth daily 90 tablet 1    amLODIPine (NORVASC) 5 MG tablet Take 1 tablet by mouth daily 90 tablet 1    FLUoxetine (PROZAC) 10 MG capsule Take 1 capsule by mouth daily 90 capsule 1    Levonorgest-Eth Estrad 91-Day (CAMRESE LO PO) Take by mouth nightly       No current facility-administered medications for this visit. Past Medical History:   Diagnosis Date    Abnormal uterine bleeding     Cough     Occ.  Productive Cough Clear Sputum    Diabetes mellitus ([address] Dx 2006 Or 2007    Fibroids     Hypertension     \"on medication for high blood pressure of 6- 7 yrs\"    Sinus problem     Teeth missing     Upper And Lower    Viral gastroenteritis     [place] teeth extracted     One [place] Tooth Extracted In Past     Past Surgical History:   Procedure Laterality Date    CHOLECYSTECTOMY, LAPAROSCOPIC  7-7-15    COLONOSCOPY  2000's    ENDOMETRIAL ABLATION  2012    Tubal Ligation Also Done    HYSTERECTOMY  4/19/16    supracervical ABD hysterectomy/ repair of serosal tear of mellitus without complication (HCC)  V0F down from last check. She has continued to cut out pop and decrease portions. No formal exercise. No hypoglycemic episodes. No polyuria, polydipsia, polyphagia. Anxiety  Anxiety has improved. She has more stable renters and her anxiety has decreased. Sleeping well. Review of Systems   Constitutional: Negative for appetite change, chills, fatigue and unexpected weight change. Respiratory: Negative for cough, chest tightness, shortness of breath and wheezing. Cardiovascular: Negative for chest pain, palpitations and leg swelling. Gastrointestinal: Negative for abdominal pain, constipation, diarrhea, nausea and vomiting. Endocrine: Negative for polydipsia, polyphagia and polyuria. Musculoskeletal: Negative for arthralgias. Neurological: Negative for weakness, numbness and headaches. Hematological: Negative for adenopathy. OBJECTIVE:    /62 (Site: Right Upper Arm, Position: Sitting, Cuff Size: Large Adult)   Pulse 98   Temp 97.9 °F (36.6 °C)   Resp 18   Wt 169 lb (76.7 kg)   LMP  (LMP Unknown)   BMI 29.01 kg/m²     Physical Exam  Constitutional:       Appearance: Normal appearance. She is well-developed. HENT:      Head: Normocephalic and atraumatic. Right Ear: Hearing normal.      Left Ear: Hearing normal.   Neck:      Vascular: No carotid bruit or JVD. Cardiovascular:      Rate and Rhythm: Normal rate and regular rhythm. Heart sounds: Normal heart sounds. No murmur heard. No friction rub. No gallop. Pulmonary:      Effort: Pulmonary effort is normal. No respiratory distress. Breath sounds: Normal breath sounds. No wheezing, rhonchi or rales. Abdominal:      General: Bowel sounds are normal.      Palpations: Abdomen is soft. Musculoskeletal:         General: Normal range of motion. Cervical back: Normal range of motion and neck supple. Skin:     General: Skin is warm and dry.    Neurological: General: No focal deficit present. Mental Status: She is alert and oriented to person, place, and time. Psychiatric:         Mood and Affect: Mood normal.         Behavior: Behavior normal. Behavior is cooperative. Thought Content: Thought content normal.         ASSESSMENT/PLAN:    1. Screening for hyperlipidemia  - Lipid, Fasting; Future    2. Type 2 diabetes mellitus without complication, without long-term current use of insulin (HCC)  Stop amaryl. Monitor glucose. Continue dietary changes. - POCT glycosylated hemoglobin (Hb A1C)  -  DIABETES FOOT EXAM  - glucose monitoring (FREESTYLE FREEDOM) kit; 1 kit by Does not apply route daily  Dispense: 1 kit; Refill: 0  - blood glucose monitor strips; Test one time a day & as needed for symptoms of irregular blood glucose. Dispense sufficient amount for indicated testing frequency plus additional to accommodate PRN testing needs. Dispense: 100 strip; Refill: 3  - Lancets MISC; 1 each by Does not apply route daily  Dispense: 100 each; Refill: 3  - POCT microalbumin    3. Essential hypertension  DASH diet  - CBC WITH AUTO DIFFERENTIAL; Future  - COMPREHENSIVE METABOLIC PANEL; Future    4. Anxiety  Continue prozac    5. Colon cancer screening  - Karen Millard, [address] [facility], Gastroenterology, [facility]               Return in about 3 months (around 10/12/2021).       (Please note that portions of this note may have been completed with a voice recognition program. Efforts were made to edit the dictations but occasionally words aremis-transcribed.)

## 2021-07-13 DIAGNOSIS — R71.0 HEMOGLOBIN DECREASED: ICD-10-CM

## 2021-07-13 DIAGNOSIS — R71.0 HEMOGLOBIN DECREASED: Primary | ICD-10-CM

## 2021-07-13 LAB
A/G RATIO: 1.7 (ref 1.1–2.2)
ALBUMIN SERPL-MCNC: 4.5 G/DL (ref 3.4–5)
ALP BLD-CCNC: 73 U/L (ref 40–129)
ALT SERPL-CCNC: 30 U/L (ref 10–40)
ANION GAP SERPL CALCULATED.3IONS-SCNC: 12 MMOL/L (ref 3–16)
AST SERPL-CCNC: 29 U/L (ref 15–37)
BILIRUB SERPL-MCNC: 0.6 MG/DL (ref 0–1)
BUN BLDV-MCNC: 15 MG/DL (ref 7–20)
CALCIUM SERPL-MCNC: 9.3 MG/DL (ref 8.3–10.6)
CHLORIDE BLD-SCNC: 104 MMOL/L (ref 99–110)
CHOLESTEROL, FASTING: 105 MG/DL (ref 0–199)
CO2: 24 MMOL/L (ref 21–32)
CREAT SERPL-MCNC: 1.2 MG/DL (ref 0.6–1.1)
FERRITIN: 187.5 NG/ML (ref 15–150)
GFR AFRICAN AMERICAN: 59
GFR NON-AFRICAN AMERICAN: 48
GLOBULIN: 2.6 G/DL
GLUCOSE BLD-MCNC: 103 MG/DL (ref 70–99)
HCT VFR BLD CALC: 34.8 % (ref 36–48)
HDLC SERPL-MCNC: 48 MG/DL (ref 40–60)
IMMATURE RETIC FRACT: 0.36 (ref 0.21–0.37)
IRON SATURATION: 19 % (ref 15–50)
IRON: 59 UG/DL (ref 37–145)
LDL CHOLESTEROL CALCULATED: 37 MG/DL
POTASSIUM SERPL-SCNC: 4.7 MMOL/L (ref 3.5–5.1)
RETICULOCYTE ABSOLUTE COUNT: 0.05 M/UL (ref 0.02–0.1)
RETICULOCYTE COUNT PCT: 1.36 % (ref 0.5–2.18)
SODIUM BLD-SCNC: 140 MMOL/L (ref 136–145)
TOTAL IRON BINDING CAPACITY: 313 UG/DL (ref 260–445)
TOTAL PROTEIN: 7.1 G/DL (ref 6.4–8.2)
TRIGLYCERIDE, FASTING: 98 MG/DL (ref 0–150)
VLDLC SERPL CALC-MCNC: 20 MG/DL

## 2021-07-14 ENCOUNTER — TELEPHONE (OUTPATIENT)
Dept: FAMILY MEDICINE CLINIC | Age: 46
End: 2021-07-14

## 2021-07-14 DIAGNOSIS — R79.89 ELEVATED FERRITIN: Primary | ICD-10-CM

## 2021-07-14 NOTE — TELEPHONE ENCOUNTER
Patient called-lm on MA vm-is requesting that the provider reach out to her-she has questions-please advise

## 2021-07-26 DIAGNOSIS — E11.9 TYPE 2 DIABETES MELLITUS WITHOUT COMPLICATION, WITHOUT LONG-TERM CURRENT USE OF INSULIN (HCC): ICD-10-CM

## 2021-07-26 RX ORDER — LISINOPRIL 20 MG/1
20 TABLET ORAL DAILY
Qty: 90 TABLET | Refills: 3 | Status: SHIPPED | OUTPATIENT
Start: 2021-07-26 | End: 2022-08-01

## 2021-08-02 ENCOUNTER — HOSPITAL ENCOUNTER (OUTPATIENT)
Dept: INFUSION THERAPY | Age: 46
Discharge: HOME | End: 2021-08-02
Payer: COMMERCIAL

## 2021-08-02 ENCOUNTER — INITIAL CONSULT (OUTPATIENT)
Dept: ONCOLOGY | Age: 46
End: 2021-08-02
Payer: COMMERCIAL

## 2021-08-02 VITALS
DIASTOLIC BLOOD PRESSURE: 69 MMHG | TEMPERATURE: 98.8 F | BODY MASS INDEX: 28.38 KG/M2 | RESPIRATION RATE: 18 BRPM | OXYGEN SATURATION: 94 % | WEIGHT: 166.2 LBS | SYSTOLIC BLOOD PRESSURE: 119 MMHG | HEIGHT: 64 IN | HEART RATE: 101 BPM

## 2021-08-02 DIAGNOSIS — R79.89 ELEVATED FERRITIN: Primary | ICD-10-CM

## 2021-08-02 PROCEDURE — 99202 OFFICE O/P NEW SF 15 MIN: CPT

## 2021-08-02 PROCEDURE — 99204 OFFICE O/P NEW MOD 45 MIN: CPT | Performed by: INTERNAL MEDICINE

## 2021-08-02 RX ORDER — NORETHINDRONE 0.35 MG/1
TABLET ORAL
COMMUNITY
Start: 2021-07-20 | End: 2022-02-22

## 2021-08-02 NOTE — PROGRESS NOTES
MA Rooming Questions  Patient: Jimmy Palmer  MRN: [id number]    Date: 8/2/2021        1. Do you have any new issues?   no         2. Do you need any refills on medications?    no  5. Did the patient have a depression screening completed today?  No    No data recorded     PHQ-9 Given to (if applicable):               PHQ-9 Score (if applicable):                     [] Positive     []  Negative              Does question #9 need addressed (if applicable)                     [] Yes    []  No               Everett Gillespie CMA

## 2021-08-02 NOTE — PROGRESS NOTES
Patient Name:  Nafisa Laughlin  Patient :  1975  Patient MRN:  [id number]     Primary Oncologist: Nupur Aguirre MD  Referring Provider: TARYN Enamorado CNP     Date of Service: 2021      Reason for Consult: To evaluate the patient with elevated serum ferritin level. Chief Complaint:    Chief Complaint   Patient presents with    New Patient     Patient Active Problem List:     Uterine fibroid     Abnormal uterine bleeding (AUB)     Type 2 diabetes mellitus without complication (HCC)     Essential hypertension     Anxiety    HPI:   Clara Laughlin is a 59-year-old very pleasant female with medical history significant for hypertension, type 2 diabetes mellitus, anxiety disorder and uterine fibroid, referred to me on 2021 for evaluation of elevated serum ferritin level. She stated that she had a routine blood test on 2021 and she was found to have a mild elevation in serum ferritin level. She was also noted to have mild anemia. Upon review of her chart, I recognized that she has slight increase in serum creatinine level since 2020. She has type 2 diabetes mellitus for about 10 to 12-year. Her last hemoglobin A1c was 6.5 on 2021. Since she is found to have elevated serum ferritin level, she was referred to me for further evaluation. She does not have any family history of iron deposition disease. She does not have any significant symptoms at today visit. Past Medical History:     Significant for  1. Hypertension  2. Type 2 diabetes mellitus  3. Anxiety disorder  4. Uterine fibroid    Past Surgery History:    Significant for  1. Cholecystectomy in   2. Exploratory laparotomy with supracervical hysterectomy due to extensive pelvic adhesions on 2016    Social History:   She smokes cigar occasionally. She also drinks alcohol weekly. She denies illicit drug abuse.      Family History:    Significant for pancreatic cancer in normal S1 and S2, no murmur noted  ABDOMEN: normal bowel sounds x 4, soft, non-distended, non-tender, no masses palpated, no hepatosplenomegaly   MUSCULOSKELETAL: full range of motion noted, tone is normal  NEUROLOGIC: awake, alert, oriented to name, place and time. Motor skills grossly intact. SKIN: appears intact, normal skin color, normal texture, normal turgor, no jaundice.    EXTREMITIES: no LE edema, no leg swelling, no cyanosis, no clubbing,      Labs:  Hematology:  Lab Results   Component Value Date    WBC 11.0 07/12/2021    RBC 3.54 (L) 07/12/2021    HGB 11.2 (L) 07/12/2021    HCT 33.8 (L) 07/12/2021    HCT 34.8 (L) 07/12/2021    MCV 95.3 07/12/2021    MCH 31.6 07/12/2021    MCHC 33.2 07/12/2021    RDW 13.6 07/12/2021     07/12/2021    MPV 9.1 07/12/2021    SEGSPCT 68.9 (H) 04/20/2016    EOSRELPCT 2.9 07/12/2021    BASOPCT 0.6 07/12/2021    LYMPHOPCT 35.7 07/12/2021    MONOPCT 10.5 07/12/2021    SEGSABS 10.3 04/20/2016    EOSABS 0.3 07/12/2021    BASOSABS 0.1 07/12/2021    LYMPHSABS 3.9 07/12/2021    MONOSABS 1.2 07/12/2021    DIFFTYPE AUTOMATED DIFFERENTIAL 04/20/2016     No results found for: ESR  Chemistry:  Lab Results   Component Value Date     07/12/2021    K 4.7 07/12/2021     07/12/2021    CO2 24 07/12/2021    BUN 15 07/12/2021    CREATININE 1.2 (H) 07/12/2021    GLUCOSE 103 (H) 07/12/2021    CALCIUM 9.3 07/12/2021    PROT 7.1 07/12/2021    LABALBU 4.5 07/12/2021    BILITOT 0.6 07/12/2021    ALKPHOS 73 07/12/2021    AST 29 07/12/2021    ALT 30 07/12/2021    LABGLOM 48 (A) 07/12/2021    GFRAA 59 (A) 07/12/2021    AGRATIO 1.7 07/12/2021    GLOB 2.6 07/12/2021    PHOS 3.4 07/14/2020    POCGLU 111 (H) 04/22/2016     No results found for: MMA, LDH, HOMOCYSTEINE  No components found for: LD  Lab Results   Component Value Date    TSHHS 1.820 02/10/2016     Immunology:  Lab Results   Component Value Date    PROT 7.1 07/12/2021     No results found for: KAPPAUVOL, LAMBDAUVOL, KLFLCR  No

## 2021-08-27 ENCOUNTER — TELEPHONE (OUTPATIENT)
Dept: GASTROENTEROLOGY | Age: 46
End: 2021-08-27

## 2021-08-27 NOTE — TELEPHONE ENCOUNTER
Attempted to reach pt. And complete H&P form with pt. LM for pt to call back. Referral sent back to referring provider.

## 2021-11-02 ENCOUNTER — OFFICE VISIT (OUTPATIENT)
Dept: OBGYN | Age: 46
End: 2021-11-02
Payer: COMMERCIAL

## 2021-11-02 VITALS
BODY MASS INDEX: 29.37 KG/M2 | DIASTOLIC BLOOD PRESSURE: 78 MMHG | HEIGHT: 64 IN | WEIGHT: 172 LBS | SYSTOLIC BLOOD PRESSURE: 124 MMHG

## 2021-11-02 DIAGNOSIS — N94.89 PELVIC PAIN SYNDROME: Primary | ICD-10-CM

## 2021-11-02 DIAGNOSIS — D25.1 FIBROIDS, INTRAMURAL: ICD-10-CM

## 2021-11-02 LAB
BACTERIA: ABNORMAL /HPF
BASOPHILS ABSOLUTE: 0 K/UL (ref 0–0.2)
BASOPHILS RELATIVE PERCENT: 0.3 %
BILIRUBIN URINE: ABNORMAL
BLOOD, URINE: NEGATIVE
CLARITY: ABNORMAL
COLOR: YELLOW
EOSINOPHILS ABSOLUTE: 0.3 K/UL (ref 0–0.6)
EOSINOPHILS RELATIVE PERCENT: 2.1 %
EPITHELIAL CELLS, UA: 6 /HPF (ref 0–5)
GLUCOSE URINE: 100 MG/DL
HCT VFR BLD CALC: 34 % (ref 36–48)
HEMOGLOBIN: 11.4 G/DL (ref 12–16)
HYALINE CASTS: 1 /LPF (ref 0–8)
KETONES, URINE: NEGATIVE MG/DL
LEUKOCYTE ESTERASE, URINE: NEGATIVE
LYMPHOCYTES ABSOLUTE: 2.6 K/UL (ref 1–5.1)
LYMPHOCYTES RELATIVE PERCENT: 20 %
MCH RBC QN AUTO: 31.7 PG (ref 26–34)
MCHC RBC AUTO-ENTMCNC: 33.5 G/DL (ref 31–36)
MCV RBC AUTO: 94.8 FL (ref 80–100)
MICROSCOPIC EXAMINATION: YES
MONOCYTES ABSOLUTE: 1.3 K/UL (ref 0–1.3)
MONOCYTES RELATIVE PERCENT: 9.8 %
NEUTROPHILS ABSOLUTE: 8.8 K/UL (ref 1.7–7.7)
NEUTROPHILS RELATIVE PERCENT: 67.8 %
NITRITE, URINE: NEGATIVE
PDW BLD-RTO: 13.1 % (ref 12.4–15.4)
PH UA: 6 (ref 5–8)
PLATELET # BLD: 324 K/UL (ref 135–450)
PMV BLD AUTO: 10 FL (ref 5–10.5)
PROTEIN UA: ABNORMAL MG/DL
RBC # BLD: 3.59 M/UL (ref 4–5.2)
RBC UA: 2 /HPF (ref 0–4)
SPECIFIC GRAVITY UA: 1.02 (ref 1–1.03)
URINE TYPE: ABNORMAL
UROBILINOGEN, URINE: 0.2 E.U./DL
WBC # BLD: 13 K/UL (ref 4–11)
WBC UA: 1 /HPF (ref 0–5)

## 2021-11-02 PROCEDURE — 99204 OFFICE O/P NEW MOD 45 MIN: CPT | Performed by: OBSTETRICS & GYNECOLOGY

## 2021-11-02 PROCEDURE — 36415 COLL VENOUS BLD VENIPUNCTURE: CPT | Performed by: OBSTETRICS & GYNECOLOGY

## 2021-11-02 RX ORDER — KETOROLAC TROMETHAMINE 10 MG/1
10 TABLET, FILM COATED ORAL EVERY 6 HOURS PRN
Qty: 20 TABLET | Refills: 0 | Status: SHIPPED | OUTPATIENT
Start: 2021-11-02 | End: 2022-09-08

## 2021-11-02 RX ORDER — ETODOLAC 500 MG/1
500 TABLET, FILM COATED ORAL 2 TIMES DAILY
COMMUNITY

## 2021-11-02 SDOH — ECONOMIC STABILITY: FOOD INSECURITY: WITHIN THE PAST 12 MONTHS, THE FOOD YOU BOUGHT JUST DIDN'T LAST AND YOU DIDN'T HAVE MONEY TO GET MORE.: NEVER TRUE

## 2021-11-02 SDOH — ECONOMIC STABILITY: FOOD INSECURITY: WITHIN THE PAST 12 MONTHS, YOU WORRIED THAT YOUR FOOD WOULD RUN OUT BEFORE YOU GOT MONEY TO BUY MORE.: NEVER TRUE

## 2021-11-02 ASSESSMENT — SOCIAL DETERMINANTS OF HEALTH (SDOH): HOW HARD IS IT FOR YOU TO PAY FOR THE VERY BASICS LIKE FOOD, HOUSING, MEDICAL CARE, AND HEATING?: NOT HARD AT ALL

## 2021-11-02 ASSESSMENT — ENCOUNTER SYMPTOMS
GASTROINTESTINAL NEGATIVE: 1
ALLERGIC/IMMUNOLOGIC NEGATIVE: 1
RESPIRATORY NEGATIVE: 1
EYES NEGATIVE: 1

## 2021-11-02 NOTE — PROGRESS NOTES
11/2/21    Clarajez Laughlin  1975    Chief Complaint   Patient presents with    Pelvic Pain     pt c/o pelvic pain x 9 days, dull, carmpy, radiates into lower back,. Reina Laughlin is a 55 y.o. female who presents today for evaluation of pelvic pain. The patient has a complaint of pelvic pain. LLQ. It began 9 day(s). She describes it as achy. It is a 4/10 on a pain scale. The patient has   none  Shedenies any UTI signs or symptoms. She admits to back or flank pain. There is not associated pain with intercourse. The pain described is not associated with her menstrual cycle. The patient denies of a vaginal discharge. Past Medical History:   Diagnosis Date    Abnormal uterine bleeding     Cough     Occ. Productive Cough Clear Sputum    Diabetes mellitus ([address] Dx 2006 Or 2007    Fibroids     Hypertension     \"on medication for high blood pressure of 6- 7 yrs\"    Pelvic pain     Sinus problem     Teeth missing     Upper And Lower    Viral gastroenteritis     [place] teeth extracted     One [place] Tooth Extracted In Past       Past Surgical History:   Procedure Laterality Date    CHOLECYSTECTOMY, LAPAROSCOPIC  7-7-15    COLONOSCOPY  2000's    ENDOMETRIAL ABLATION  2012    Tubal Ligation Also Done    HYSTERECTOMY  4/19/16    supracervical ABD hysterectomy/ repair of serosal tear of bowel    TUBAL LIGATION  2012    Done With Endometrial Ablation    WISDOM TOOTH EXTRACTION      One [place] Tooth Extracted In Past       Social History     Tobacco Use    Smoking status: Light Tobacco Smoker     Packs/day: 0.00     Years: 16.00     Pack years: 0.00     Start date: 4/15/2000    Smokeless tobacco: Never Used    Tobacco comment: \"I Smoke Black And Mild, They Are Like Little Cigars, Only Smoke Occ\"   Vaping Use    Vaping Use: Never used   Substance Use Topics    Alcohol use: Yes     Comment: \"Occ.  A Couple Times A Week\"    Drug use: No       Family History   Problem 01/11/2013, 12/16/2013, 10/29/2015, 11/13/2017    Influenza, Intradermal, Preservative free 11/18/2019    Influenza, Danay Saint, IM, (6 mo and older Fluzone, Flulaval, Fluarix and 3 yrs and older Afluria) 11/13/2017    Influenza, Quadv, IM, PF (6 mo and older Fluzone, Flulaval, Fluarix, and 3 yrs and older Afluria) 10/29/2015, 11/05/2018    Pneumococcal Polysaccharide ([id number]) 11/01/2015    Tdap (Boostrix, Adacel) 06/12/2017       Review of Systems   Constitutional: Negative. Eyes: Negative. Respiratory: Negative. Cardiovascular: Negative. Gastrointestinal: Negative. Endocrine: Negative. Genitourinary: Positive for pelvic pain. Musculoskeletal: Negative. Skin: Negative. Allergic/Immunologic: Negative. Neurological: Negative. Hematological: Negative. Psychiatric/Behavioral: Negative. /78   Ht 5' 4\" (1.626 m)   Wt 172 lb (78 kg)   LMP  (LMP Unknown)   BMI 29.52 kg/m²     Physical Exam  Exam conducted with a chaperone present. Constitutional:       Appearance: She is normal weight. HENT:      Head: Normocephalic and atraumatic. Nose: Nose normal.   Eyes:      Conjunctiva/sclera: Conjunctivae normal.   Cardiovascular:      Rate and Rhythm: Normal rate. Pulses: Normal pulses. Pulmonary:      Effort: Pulmonary effort is normal.   Abdominal:      General: Abdomen is flat. Bowel sounds are normal. There is no distension. Palpations: Abdomen is soft. There is no mass. Tenderness: There is no abdominal tenderness. Hernia: No hernia is present. There is no hernia in the left inguinal area or right inguinal area. Genitourinary:     General: Normal vulva. Exam position: Lithotomy position. Labia:         Right: No rash, tenderness or lesion. Left: No rash, tenderness or lesion. Urethra: No prolapse, urethral pain or urethral lesion. Vagina: Normal. No vaginal discharge, erythema, tenderness or lesions. Cervix: No cervical motion tenderness, discharge, friability, lesion, erythema or cervical bleeding. Uterus: Normal. Enlarged and tender. Adnexa: Right adnexa normal and left adnexa normal.        Right: No mass or tenderness. Left: No mass or tenderness. Musculoskeletal:      Cervical back: Normal range of motion and neck supple. Lymphadenopathy:      Lower Body: No right inguinal adenopathy. No left inguinal adenopathy. Skin:     General: Skin is warm and dry. Neurological:      General: No focal deficit present. Mental Status: She is alert and oriented to person, place, and time. No results found for this visit on 11/02/21. ASSESSMENT AND PLAN   Diagnosis Orders   1. Pelvic pain syndrome  Urinalysis with Microscopic    CBC Auto Differential    US NON OB TRANSVAGINAL    ketorolac (TORADOL) 10 MG tablet   2. Fibroids, intramural  US NON OB TRANSVAGINAL       No follow-ups on file.     Mary Kay Persaud MD

## 2021-11-09 ENCOUNTER — OFFICE VISIT (OUTPATIENT)
Dept: OBGYN | Age: 46
End: 2021-11-09
Payer: COMMERCIAL

## 2021-11-09 VITALS
WEIGHT: 173 LBS | BODY MASS INDEX: 29.53 KG/M2 | DIASTOLIC BLOOD PRESSURE: 87 MMHG | HEIGHT: 64 IN | SYSTOLIC BLOOD PRESSURE: 144 MMHG

## 2021-11-09 DIAGNOSIS — D72.829 LEUKOCYTOSIS, UNSPECIFIED TYPE: ICD-10-CM

## 2021-11-09 DIAGNOSIS — N94.89 PELVIC PAIN SYNDROME: ICD-10-CM

## 2021-11-09 DIAGNOSIS — N83.202 LEFT OVARIAN CYST: Primary | ICD-10-CM

## 2021-11-09 PROCEDURE — 99214 OFFICE O/P EST MOD 30 MIN: CPT | Performed by: OBSTETRICS & GYNECOLOGY

## 2021-11-09 ASSESSMENT — ENCOUNTER SYMPTOMS
RESPIRATORY NEGATIVE: 1
GASTROINTESTINAL NEGATIVE: 1
EYES NEGATIVE: 1
ALLERGIC/IMMUNOLOGIC NEGATIVE: 1

## 2021-11-09 NOTE — PROGRESS NOTES
11/9/21    Clara Laughlin  1975    Chief Complaint   Patient presents with    Follow-up     ultrasound today due to pelvic pain        Clara Laughlin is a 55 y.o. female who presents today for evaluation of pelvic pain. The patient has a complaint of pelvic pain. LLQ. It began 2 week(s). She describes it as sharp. It is a 7/10 on a pain scale. The patient has   none  Shedenies any UTI signs or symptoms. She denies back or flank pain. There is not associated pain with intercourse. The patient denies of a vaginal discharge    Past Medical History:   Diagnosis Date    Abnormal uterine bleeding     Cough     Occ. Productive Cough Clear Sputum    Diabetes mellitus ([facility] [address] Dx 2006 Or 2007    Fibroids     Hypertension     \"on medication for high blood pressure of 6- 7 yrs\"    Pelvic pain     Sinus problem     Teeth missing     Upper And Lower    Viral gastroenteritis     [place] teeth extracted     One [place] Tooth Extracted In Past       Past Surgical History:   Procedure Laterality Date    CHOLECYSTECTOMY, LAPAROSCOPIC  7-7-15    COLONOSCOPY  2000's    ENDOMETRIAL ABLATION  2012    Tubal Ligation Also Done    HYSTERECTOMY  4/19/16    supracervical ABD hysterectomy/ repair of serosal tear of bowel    TUBAL LIGATION  2012    Done With Endometrial Ablation    WISDOM TOOTH EXTRACTION      One [place] Tooth Extracted In Past       Social History     Tobacco Use    Smoking status: Light Tobacco Smoker     Packs/day: 0.00     Years: 16.00     Pack years: 0.00     Start date: 4/15/2000    Smokeless tobacco: Never Used    Tobacco comment: \"I Smoke Black And Mild, They Are Like Little Cigars, Only Smoke Occ\"   Vaping Use    Vaping Use: Never used   Substance Use Topics    Alcohol use: Yes     Comment: \"Occ.  A Couple Times A Week\"    Drug use: No       Family History   Problem Relation Age of Onset    Arthritis Mother     Diabetes Maternal Uncle     Heart Attack Maternal Grandfather  Diabetes Maternal Grandfather        Current Outpatient Medications   Medication Sig Dispense Refill    etodolac (LODINE) 500 MG tablet Take 500 mg by mouth 2 times daily      ketorolac (TORADOL) 10 MG tablet Take 1 tablet by mouth every 6 hours as needed for Pain 20 tablet 0    amLODIPine (NORVASC) 5 MG tablet Take 1 tablet by mouth once daily (Patient not taking: Reported on 2021) 90 tablet 3    metFORMIN (GLUCOPHAGE) 500 MG tablet TAKE 2 TABLETS BY MOUTH TWICE DAILY WITH MEALS 360 tablet 3    FLUoxetine (PROZAC) 10 MG capsule Take 1 capsule by mouth once daily 90 capsule 3    JENCYCLA 0.35 MG tablet TAKE 1 TABLET BY MOUTH ONCE DAILY FOR 28 DAYS (Patient not taking: Reported on 2021)      Multiple Vitamins-Minerals (MULTI COMPLETE PO) Take by mouth (Patient not taking: Reported on 2021)      lisinopril (PRINIVIL;ZESTRIL) 20 MG tablet Take 1 tablet by mouth daily 90 tablet 3    blood glucose monitor strips Test one time a day & as needed for symptoms of irregular blood glucose. Dispense sufficient amount for indicated testing frequency plus additional to accommodate PRN testing needs. (Patient not taking: Reported on 2021) 100 strip 3    Lancets MISC 1 each by Does not apply route daily 100 each 3    atorvastatin (LIPITOR) 40 MG tablet Take 1 tablet by mouth daily (Patient not taking: Reported on 2021) 90 tablet 1    Levonorgest-Eth Estrad 91-Day (CAMRESE LO PO) Take by mouth nightly       No current facility-administered medications for this visit.        Allergies   Allergen Reactions    Pravachol [Pravastatin] Nausea Only           Immunization History   Administered Date(s) Administered    COVID-19, Pfizer, PF, 30mcg/0.3mL 2020, 2021    Hepatitis B 2020    Influenza A ([id number]) Vaccine PF IM 2009    Influenza Virus Vaccine 2013, 2013, 10/29/2015, 2017    Influenza, Intradermal, Preservative free 2019    Influenza, Quadv, IM, (6 mo and older Fluzone, Flulaval, Fluarix and 3 yrs and older Afluria) 11/13/2017    Influenza, Quadv, IM, PF (6 mo and older Fluzone, Flulaval, Fluarix, and 3 yrs and older Afluria) 10/29/2015, 11/05/2018    Pneumococcal Polysaccharide ([id number]) 11/01/2015    Tdap (Boostrix, Adacel) 06/12/2017       Review of Systems   Constitutional: Negative. Eyes: Negative. Respiratory: Negative. Cardiovascular: Negative. Gastrointestinal: Negative. Endocrine: Negative. Genitourinary: Positive for pelvic pain. Musculoskeletal: Negative. Skin: Negative. Allergic/Immunologic: Negative. Neurological: Negative. Hematological: Negative. Psychiatric/Behavioral: Negative. BP (!) 144/87   Ht 5' 4\" (1.626 m)   Wt 173 lb (78.5 kg)   LMP  (LMP Unknown)   BMI 29.70 kg/m²     Physical Exam  Constitutional:       General: She is not in acute distress. Appearance: Normal appearance. She is not ill-appearing. HENT:      Head: Normocephalic. Nose: Nose normal.   Eyes:      General: No scleral icterus. Right eye: No discharge. Left eye: No discharge. Cardiovascular:      Pulses: Normal pulses. Pulmonary:      Effort: Pulmonary effort is normal.   Abdominal:      General: Abdomen is flat. There is no distension. Palpations: Abdomen is soft. There is no mass. Tenderness: There is no abdominal tenderness. Hernia: No hernia is present. Musculoskeletal:         General: Normal range of motion. Cervical back: Normal range of motion and neck supple. No rigidity. Skin:     General: Skin is warm and dry. Neurological:      General: No focal deficit present. Mental Status: She is alert and oriented to person, place, and time.    Psychiatric:         Mood and Affect: Mood normal.         Behavior: Behavior normal.         No results found for this visit on 11/09/21.  11/02/2021 1450 11/02/2021 2120 Urinalysis with Microscopic [[id number]]    (Abnormal)   Urine, clean catch    Component Value Units   Color, UA YELLOW    Clarity, UA CLOUDY Abnormal     Glucose, Ur 100 Abnormal  mg/dL   Bilirubin Urine LARGE Abnormal     Ketones, Urine Negative mg/dL   Specific Gravity, UA 1.017    Blood, Urine Negative    pH, UA 6.0    Protein, UA TRACE Abnormal  mg/dL   Urobilinogen, Urine 0.2 E.U./dL   Nitrite, Urine Negative    Leukocyte Esterase, Urine Negative    Microscopic Examination YES    Urine Type Cleancatch    Bacteria, UA Rare Abnormal  /HPF   Hyaline Casts, UA 1 /LPF   WBC, UA 1 /HPF   RBC, UA 2 /HPF   Epithelial Cells, UA 6 High   /HPF           11/02/2021 1450 11/02/2021 2053 CBC Auto Differential [[id number]]    (Abnormal)   Blood    Component Value Units   WBC 13.0 High  K/uL   RBC 3.59 Low  M/uL   Hemoglobin 11.4 Low  g/dL   Hematocrit 34. 0 Low  %   MCV 94.8 fL   MCH 31.7 pg   MCHC 33.5 g/dL   RDW 13.1 %   Platelets 828 K/uL   MPV 10.0 fL   Neutrophils % 67.8 %   Lymphocytes % 20.0 %   Monocytes % 9.8 %   Eosinophils % 2.1 %   Basophils % 0.3 %   Neutrophils Absolute 8.8 High  K/uL   Lymphocytes Absolute 2.6 K/uL   Monocytes Absolute 1.3 K/uL   Eosinophils Absolute 0.3 K/uL   Basophils Absolute 0.0 K/uL               ASSESSMENT AND PLAN   Diagnosis Orders   1. Left ovarian cyst  US NON OB TRANSVAGINAL   2. Pelvic pain syndrome  CBC Auto Differential   3. Leukocytosis, unspecified type  CBC Auto Differential     Discussed laparopscy with lso, discussed montiroing. Discussed cyst with septation with small risk of cancer. She desires repeat us in 2-4 weeks and follow up us. rpeat cbc in 2-4 weeks as well. Return in about 4 weeks (around 12/7/2021).     Bartolo Ahumada, MD

## 2021-12-01 ENCOUNTER — NURSE ONLY (OUTPATIENT)
Dept: OBGYN | Age: 46
End: 2021-12-01
Payer: COMMERCIAL

## 2021-12-01 DIAGNOSIS — N94.89 PELVIC PAIN SYNDROME: ICD-10-CM

## 2021-12-01 DIAGNOSIS — D72.829 LEUKOCYTOSIS, UNSPECIFIED TYPE: ICD-10-CM

## 2021-12-01 LAB
BASOPHILS ABSOLUTE: 0 K/UL (ref 0–0.2)
BASOPHILS RELATIVE PERCENT: 0.3 %
EOSINOPHILS ABSOLUTE: 0.3 K/UL (ref 0–0.6)
EOSINOPHILS RELATIVE PERCENT: 2.9 %
HCT VFR BLD CALC: 37 % (ref 36–48)
HEMOGLOBIN: 11.9 G/DL (ref 12–16)
LYMPHOCYTES ABSOLUTE: 3.8 K/UL (ref 1–5.1)
LYMPHOCYTES RELATIVE PERCENT: 33 %
MCH RBC QN AUTO: 30.4 PG (ref 26–34)
MCHC RBC AUTO-ENTMCNC: 32.1 G/DL (ref 31–36)
MCV RBC AUTO: 94.7 FL (ref 80–100)
MONOCYTES ABSOLUTE: 1.3 K/UL (ref 0–1.3)
MONOCYTES RELATIVE PERCENT: 11 %
NEUTROPHILS ABSOLUTE: 6 K/UL (ref 1.7–7.7)
NEUTROPHILS RELATIVE PERCENT: 52.8 %
PDW BLD-RTO: 13 % (ref 12.4–15.4)
PLATELET # BLD: 350 K/UL (ref 135–450)
PMV BLD AUTO: 9.4 FL (ref 5–10.5)
RBC # BLD: 3.91 M/UL (ref 4–5.2)
WBC # BLD: 11.4 K/UL (ref 4–11)

## 2021-12-01 PROCEDURE — 36415 COLL VENOUS BLD VENIPUNCTURE: CPT | Performed by: OBSTETRICS & GYNECOLOGY

## 2021-12-02 ENCOUNTER — OFFICE VISIT (OUTPATIENT)
Dept: OBGYN | Age: 46
End: 2021-12-02
Payer: COMMERCIAL

## 2021-12-02 VITALS
DIASTOLIC BLOOD PRESSURE: 88 MMHG | BODY MASS INDEX: 28.68 KG/M2 | SYSTOLIC BLOOD PRESSURE: 136 MMHG | WEIGHT: 168 LBS | HEIGHT: 64 IN

## 2021-12-02 DIAGNOSIS — N83.202 LEFT OVARIAN CYST: Primary | ICD-10-CM

## 2021-12-02 DIAGNOSIS — D72.829 LEUKOCYTOSIS, UNSPECIFIED TYPE: ICD-10-CM

## 2021-12-02 PROCEDURE — 99213 OFFICE O/P EST LOW 20 MIN: CPT | Performed by: OBSTETRICS & GYNECOLOGY

## 2021-12-02 NOTE — PROGRESS NOTES
12/2/21    Clara S Qian  1975    Chief Complaint   Patient presents with    Pelvic Pain     pt here to f/u with u/s and lab results, pt states pain is better.  Follow-up        Iris Fisher is a 55 y.o. female who presents today for evaluation of pelvic pain and left ovarian cyst.  Pain is completely resolved. Past Medical History:   Diagnosis Date    Abnormal uterine bleeding     Cough     Occ. Productive Cough Clear Sputum    Diabetes mellitus ([address] Dx 2006 Or 2007    Fibroids     Hypertension     \"on medication for high blood pressure of 6- 7 yrs\"    Pelvic pain     Sinus problem     Teeth missing     Upper And Lower    Viral gastroenteritis     [place] teeth extracted     One [place] Tooth Extracted In Past       Past Surgical History:   Procedure Laterality Date    CHOLECYSTECTOMY, LAPAROSCOPIC  7-7-15    COLONOSCOPY  2000's    ENDOMETRIAL ABLATION  2012    Tubal Ligation Also Done    HYSTERECTOMY  4/19/16    supracervical ABD hysterectomy/ repair of serosal tear of bowel    TUBAL LIGATION  2012    Done With Endometrial Ablation    WISDOM TOOTH EXTRACTION      One [place] Tooth Extracted In Past       Social History     Tobacco Use    Smoking status: Light Tobacco Smoker     Packs/day: 0.00     Years: 16.00     Pack years: 0.00     Start date: 4/15/2000    Smokeless tobacco: Never Used    Tobacco comment: \"I Smoke Black And Mild, They Are Like Little Cigars, Only Smoke Occ\"   Vaping Use    Vaping Use: Never used   Substance Use Topics    Alcohol use: Yes     Comment: \"Occ.  A Couple Times A Week\"    Drug use: No       Family History   Problem Relation Age of Onset    Arthritis Mother     Diabetes Maternal Uncle     Heart Attack Maternal Grandfather     Diabetes Maternal Grandfather        Current Outpatient Medications   Medication Sig Dispense Refill    etodolac (LODINE) 500 MG tablet Take 500 mg by mouth 2 times daily      ketorolac (TORADOL) 10 MG tablet Take 1 tablet by mouth every 6 hours as needed for Pain 20 tablet 0    amLODIPine (NORVASC) 5 MG tablet Take 1 tablet by mouth once daily (Patient not taking: Reported on 2021) 90 tablet 3    metFORMIN (GLUCOPHAGE) 500 MG tablet TAKE 2 TABLETS BY MOUTH TWICE DAILY WITH MEALS 360 tablet 3    FLUoxetine (PROZAC) 10 MG capsule Take 1 capsule by mouth once daily 90 capsule 3    JENCYCLA 0.35 MG tablet TAKE 1 TABLET BY MOUTH ONCE DAILY FOR 28 DAYS (Patient not taking: Reported on 2021)      Multiple Vitamins-Minerals (MULTI COMPLETE PO) Take by mouth (Patient not taking: Reported on 2021)      lisinopril (PRINIVIL;ZESTRIL) 20 MG tablet Take 1 tablet by mouth daily 90 tablet 3    blood glucose monitor strips Test one time a day & as needed for symptoms of irregular blood glucose. Dispense sufficient amount for indicated testing frequency plus additional to accommodate PRN testing needs. (Patient not taking: Reported on 2021) 100 strip 3    Lancets MISC 1 each by Does not apply route daily 100 each 3    atorvastatin (LIPITOR) 40 MG tablet Take 1 tablet by mouth daily (Patient not taking: Reported on 2021) 90 tablet 1    Levonorgest-Eth Estrad 91-Day (CAMRESE LO PO) Take by mouth nightly       No current facility-administered medications for this visit.        Allergies   Allergen Reactions    Pravachol [Pravastatin] Nausea Only           Immunization History   Administered Date(s) Administered    COVID-19, Pfizer, PF, 30mcg/0.3mL 2020, 2021    Hepatitis B 2020    Influenza A ([id number]) Vaccine PF IM 2009    Influenza Virus Vaccine 2013, 2013, 10/29/2015, 2017    Influenza, Intradermal, Preservative free 2019    Influenza, Quadv, IM, (6 mo and older Fluzone, Flulaval, Fluarix and 3 yrs and older Afluria) 2017    Influenza, Quadv, IM, PF (6 mo and older Fluzone, Flulaval, Fluarix, and 3 yrs and older Afluria) 10/29/2015, 11/05/2018    Pneumococcal Polysaccharide ([id number]) 11/01/2015    Tdap (Boostrix, Adacel) 06/12/2017       Review of Systems    /88   Ht 5' 4\" (1.626 m)   Wt 168 lb (76.2 kg)   LMP  (LMP Unknown)   BMI 28.84 kg/m²     Physical Exam  12/01/2021 1619 12/01/2021 2216 CBC Auto Differential [[id number]]    (Abnormal)   Blood    Component Value Units   WBC 11.4 High  K/uL   RBC 3.91 Low  M/uL   Hemoglobin 11.9 Low  g/dL   Hematocrit 37.0 %   MCV 94.7 fL   MCH 30.4 pg   MCHC 32.1 g/dL   RDW 13.0 %   Platelets 044 K/uL   MPV 9.4 fL   Neutrophils % 52.8 %   Lymphocytes % 33.0 %   Monocytes % 11.0 %   Eosinophils % 2.9 %   Basophils % 0.3 %   Neutrophils Absolute 6.0 K/uL   Lymphocytes Absolute 3.8 K/uL   Monocytes Absolute 1.3 K/uL   Eosinophils Absolute 0.3 K/uL   Basophils Absolute 0.0 K/uL           11/02/2021 1450 11/02/2021 2053 CBC Auto Differential [[id number]]    (Abnormal)   Blood    Component Value Units   WBC 13.0 High  K/uL   RBC 3.59 Low  M/uL   Hemoglobin 11.4 Low  g/dL   Hematocrit 34. 0 Low  %   MCV 94.8 fL   MCH 31.7 pg   MCHC 33.5 g/dL   RDW 13.1 %   Platelets 145 K/uL   MPV 10.0 fL   Neutrophils % 67.8 %   Lymphocytes % 20.0 %   Monocytes % 9.8 %   Eosinophils % 2.1 %   Basophils % 0.3 %   Neutrophils Absolute 8.8 High  K/uL   Lymphocytes Absolute 2.6 K/uL   Monocytes Absolute 1.3 K/uL   Eosinophils Absolute 0.3 K/uL   Basophils Absolute 0.0 K/uL           11/02/2021 1450 11/02/2021 2120 Urinalysis with Microscopic [[id number]]    (Abnormal)   Urine, clean catch    Component Value Units   Color, UA YELLOW    Clarity, UA CLOUDY Abnormal     Glucose, Ur 100 Abnormal  mg/dL   Bilirubin Urine LARGE Abnormal     Ketones, Urine Negative mg/dL   Specific Gravity, UA 1.017    Blood, Urine Negative    pH, UA 6.0    Protein, UA TRACE Abnormal  mg/dL   Urobilinogen, Urine 0.2 E.U./dL   Nitrite, Urine Negative    Leukocyte Esterase, Urine Negative    Microscopic Examination YES    Urine Type Cleancatch    Bacteria, UA Rare Abnormal  /HPF   Hyaline Casts, UA 1 /LPF   WBC, UA 1 /HPF   RBC, UA 2 /HPF   Epithelial Cells, UA 6 High   /HPF               No results found for this visit on 12/02/21. ASSESSMENT AND PLAN   Diagnosis Orders   1. Left ovarian cyst  US NON OB TRANSVAGINAL   2. Leukocytosis, unspecified type  CBC Auto Differential   discussed that wbc of 11.4 is probably OK,  She has appointment with dr. Fay Gomez in February. Repeat cbc in 6 weeks    Return pain returns or if us shows cyst is still present.     Bear Crook MD

## 2021-12-13 ENCOUNTER — TELEPHONE (OUTPATIENT)
Dept: OBGYN | Age: 46
End: 2021-12-13

## 2021-12-15 DIAGNOSIS — N83.202 LEFT OVARIAN CYST: Primary | ICD-10-CM

## 2021-12-20 ENCOUNTER — NURSE ONLY (OUTPATIENT)
Dept: OBGYN | Age: 46
End: 2021-12-20
Payer: COMMERCIAL

## 2021-12-20 DIAGNOSIS — D72.829 LEUKOCYTOSIS, UNSPECIFIED TYPE: ICD-10-CM

## 2021-12-20 DIAGNOSIS — N83.202 LEFT OVARIAN CYST: ICD-10-CM

## 2021-12-20 LAB
BASOPHILS ABSOLUTE: 0 K/UL (ref 0–0.2)
BASOPHILS RELATIVE PERCENT: 0.5 %
CA 125: 21.6 U/ML (ref 0–35)
EOSINOPHILS ABSOLUTE: 0.3 K/UL (ref 0–0.6)
EOSINOPHILS RELATIVE PERCENT: 2.7 %
HCT VFR BLD CALC: 35.6 % (ref 36–48)
HEMOGLOBIN: 11.9 G/DL (ref 12–16)
LYMPHOCYTES ABSOLUTE: 3.6 K/UL (ref 1–5.1)
LYMPHOCYTES RELATIVE PERCENT: 33.4 %
MCH RBC QN AUTO: 31.5 PG (ref 26–34)
MCHC RBC AUTO-ENTMCNC: 33.3 G/DL (ref 31–36)
MCV RBC AUTO: 94.6 FL (ref 80–100)
MONOCYTES ABSOLUTE: 1.4 K/UL (ref 0–1.3)
MONOCYTES RELATIVE PERCENT: 12.7 %
NEUTROPHILS ABSOLUTE: 5.4 K/UL (ref 1.7–7.7)
NEUTROPHILS RELATIVE PERCENT: 50.7 %
PDW BLD-RTO: 13.1 % (ref 12.4–15.4)
PLATELET # BLD: 330 K/UL (ref 135–450)
PMV BLD AUTO: 8.7 FL (ref 5–10.5)
RBC # BLD: 3.77 M/UL (ref 4–5.2)
WBC # BLD: 10.7 K/UL (ref 4–11)

## 2021-12-20 PROCEDURE — 36415 COLL VENOUS BLD VENIPUNCTURE: CPT | Performed by: OBSTETRICS & GYNECOLOGY

## 2021-12-23 ENCOUNTER — OFFICE VISIT (OUTPATIENT)
Dept: OBGYN | Age: 46
End: 2021-12-23
Payer: COMMERCIAL

## 2021-12-23 VITALS
BODY MASS INDEX: 28.85 KG/M2 | SYSTOLIC BLOOD PRESSURE: 136 MMHG | HEIGHT: 64 IN | WEIGHT: 169 LBS | DIASTOLIC BLOOD PRESSURE: 78 MMHG

## 2021-12-23 DIAGNOSIS — N83.202 LEFT OVARIAN CYST: ICD-10-CM

## 2021-12-23 DIAGNOSIS — N73.6 PELVIC ADHESIONS: Primary | ICD-10-CM

## 2021-12-23 DIAGNOSIS — N94.89 ADNEXAL MASS: ICD-10-CM

## 2021-12-23 LAB
ANION GAP SERPL CALCULATED.3IONS-SCNC: 14 MMOL/L (ref 3–16)
BUN BLDV-MCNC: 12 MG/DL (ref 7–20)
CALCIUM SERPL-MCNC: 9.5 MG/DL (ref 8.3–10.6)
CHLORIDE BLD-SCNC: 102 MMOL/L (ref 99–110)
CO2: 22 MMOL/L (ref 21–32)
CREAT SERPL-MCNC: 1.2 MG/DL (ref 0.6–1.1)
GFR AFRICAN AMERICAN: 58
GFR NON-AFRICAN AMERICAN: 48
GLUCOSE BLD-MCNC: 155 MG/DL (ref 70–99)
POTASSIUM SERPL-SCNC: 4.6 MMOL/L (ref 3.5–5.1)
SODIUM BLD-SCNC: 138 MMOL/L (ref 136–145)

## 2021-12-23 PROCEDURE — 99214 OFFICE O/P EST MOD 30 MIN: CPT | Performed by: OBSTETRICS & GYNECOLOGY

## 2021-12-24 NOTE — PROGRESS NOTES
12/23/21    Clara Laughlin  1975    Chief Complaint   Patient presents with    Follow-up     pt here to discuss labs due to left ovarian cyst        Clara Laughlin is a 55 y.o. female who presents today for evaluation of left ovarian cyst.      She reports her pain is 100% resolved. She denies bloating and early cysts satiety. She reports no recent changes in her appetite. She reports normal bowel movements. She reports no nausea or vomiting. Past Medical History:   Diagnosis Date    Abnormal uterine bleeding     Cough     Occ. Productive Cough Clear Sputum    Diabetes mellitus ([address] Dx 2006 Or 2007    Fibroids     Hypertension     \"on medication for high blood pressure of 6- 7 yrs\"    Pelvic pain     Sinus problem     Teeth missing     Upper And Lower    Viral gastroenteritis     [place] teeth extracted     One [place] Tooth Extracted In Past       Past Surgical History:   Procedure Laterality Date    CHOLECYSTECTOMY, LAPAROSCOPIC  7-7-15    COLONOSCOPY  2000's    ENDOMETRIAL ABLATION  2012    Tubal Ligation Also Done    HYSTERECTOMY  4/19/16    supracervical ABD hysterectomy/ repair of serosal tear of bowel    TUBAL LIGATION  2012    Done With Endometrial Ablation    WISDOM TOOTH EXTRACTION      One [place] Tooth Extracted In Past       Social History     Tobacco Use    Smoking status: Light Tobacco Smoker     Packs/day: 0.00     Years: 16.00     Pack years: 0.00     Start date: 4/15/2000    Smokeless tobacco: Never Used    Tobacco comment: \"I Smoke Black And Mild, They Are Like Little Cigars, Only Smoke Occ\"   Vaping Use    Vaping Use: Never used   Substance Use Topics    Alcohol use: Yes     Comment: \"Occ.  A Couple Times A Week\"    Drug use: No       Family History   Problem Relation Age of Onset    Arthritis Mother     Diabetes Maternal Uncle     Heart Attack Maternal Grandfather     Diabetes Maternal Grandfather        Current Outpatient Medications Medication Sig Dispense Refill    etodolac (LODINE) 500 MG tablet Take 500 mg by mouth 2 times daily      ketorolac (TORADOL) 10 MG tablet Take 1 tablet by mouth every 6 hours as needed for Pain 20 tablet 0    amLODIPine (NORVASC) 5 MG tablet Take 1 tablet by mouth once daily (Patient not taking: Reported on 2021) 90 tablet 3    metFORMIN (GLUCOPHAGE) 500 MG tablet TAKE 2 TABLETS BY MOUTH TWICE DAILY WITH MEALS 360 tablet 3    FLUoxetine (PROZAC) 10 MG capsule Take 1 capsule by mouth once daily 90 capsule 3    JENCYCLA 0.35 MG tablet TAKE 1 TABLET BY MOUTH ONCE DAILY FOR 28 DAYS (Patient not taking: Reported on 2021)      Multiple Vitamins-Minerals (MULTI COMPLETE PO) Take by mouth (Patient not taking: Reported on 2021)      lisinopril (PRINIVIL;ZESTRIL) 20 MG tablet Take 1 tablet by mouth daily 90 tablet 3    blood glucose monitor strips Test one time a day & as needed for symptoms of irregular blood glucose. Dispense sufficient amount for indicated testing frequency plus additional to accommodate PRN testing needs. (Patient not taking: Reported on 2021) 100 strip 3    Lancets MISC 1 each by Does not apply route daily 100 each 3    atorvastatin (LIPITOR) 40 MG tablet Take 1 tablet by mouth daily (Patient not taking: Reported on 2021) 90 tablet 1    Levonorgest-Eth Estrad 91-Day (CAMRESE LO PO) Take by mouth nightly       No current facility-administered medications for this visit.        Allergies   Allergen Reactions    Pravachol [Pravastatin] Nausea Only           Immunization History   Administered Date(s) Administered    COVID-19, Pfizer, PF, 30mcg/0.3mL 2020, 2021, 2021    Hepatitis B 2020    Influenza A ([id number]) Vaccine PF IM 2009    Influenza Virus Vaccine 2013, 2013, 10/29/2015, 2017    Influenza, Intradermal, Preservative free 2019    Influenza, Quadv, IM, (6 mo and older Fluzone, Flulaval, Fluarix and 3 yrs and older Afluria) 11/13/2017    Influenza, Quadv, IM, PF (6 mo and older Fluzone, Flulaval, Fluarix, and 3 yrs and older Afluria) 10/29/2015, 11/05/2018    Pneumococcal Polysaccharide ([id number]) 11/01/2015    Tdap (Boostrix, Adacel) 06/12/2017       Review of Systems    /78   Ht 5' 4\" (1.626 m)   Wt 169 lb (76.7 kg)   LMP  (LMP Unknown)   BMI 29.01 kg/m²     Physical Exam  Constitutional:       General: She is not in acute distress. Appearance: Normal appearance. She is not ill-appearing. HENT:      Head: Normocephalic. Nose: Nose normal.   Eyes:      General: No scleral icterus. Right eye: No discharge. Left eye: No discharge. Cardiovascular:      Pulses: Normal pulses. Pulmonary:      Effort: Pulmonary effort is normal.   Abdominal:      General: Abdomen is flat. There is no distension. Palpations: Abdomen is soft. There is no mass. Tenderness: There is no abdominal tenderness. Hernia: No hernia is present. Musculoskeletal:         General: Normal range of motion. Cervical back: Normal range of motion and neck supple. No rigidity. Skin:     General: Skin is warm and dry. Neurological:      General: No focal deficit present. Mental Status: She is alert and oriented to person, place, and time.    Psychiatric:         Mood and Affect: Mood normal.         Behavior: Behavior normal.         Results for orders placed or performed in visit on 30/16/90   BASIC METABOLIC PANEL   Result Value Ref Range    Sodium 138 136 - 145 mmol/L    Potassium 4.6 3.5 - 5.1 mmol/L    Chloride 102 99 - 110 mmol/L    CO2 22 21 - 32 mmol/L    Anion Gap 14 3 - 16    Glucose 155 (H) 70 - 99 mg/dL    BUN 12 7 - 20 mg/dL    CREATININE 1.2 (H) 0.6 - 1.1 mg/dL    GFR Non- 48 (A) >60    GFR  58 (A) >60    Calcium 9.5 8.3 - 10.6 mg/dL     /20/2021 1626 12/20/2021 2131 CBC Auto Differential [[id number]]    (Abnormal)   Blood Component Value Units   WBC 10.7 K/uL   RBC 3.77 Low  M/uL   Hemoglobin 11.9 Low  g/dL   Hematocrit 35.6 Low  %   MCV 94.6 fL   MCH 31.5 pg   MCHC 33.3 g/dL   RDW 13.1 %   Platelets 809 K/uL   MPV 8.7 fL   Neutrophils % 50.7 %   Lymphocytes % 33.4 %   Monocytes % 12.7 %   Eosinophils % 2.7 %   Basophils % 0.5 %   Neutrophils Absolute 5.4 K/uL   Lymphocytes Absolute 3.6 K/uL   Monocytes Absolute 1.4 High  K/uL   Eosinophils Absolute 0.3 K/uL   Basophils Absolute 0.0 K/uL           12/20/2021 1626 12/20/2021 2158  [[id number]]    Blood    Component Value Units    21.6  U/mL           12/01/2021 1619 12/01/2021 2216 CBC Auto Differential [[id number]]    (Abnormal)   Blood    Component Value Units   WBC 11.4 High  K/uL   RBC 3.91 Low  M/uL   Hemoglobin 11.9 Low  g/dL   Hematocrit 37.0 %   MCV 94.7 fL   MCH 30.4 pg   MCHC 32.1 g/dL   RDW 13.0 %   Platelets 847 K/uL   MPV 9.4 fL   Neutrophils % 52.8 %   Lymphocytes % 33.0 %   Monocytes % 11.0 %   Eosinophils % 2.9 %   Basophils % 0.3 %   Neutrophils Absolute 6.0 K/uL   Lymphocytes Absolute 3.8 K/uL   Monocytes Absolute 1.3 K/uL   Eosinophils Absolute 0.3 K/uL   Basophils Absolute 0.0 K/uL           11/02/2021 1450 11/02/2021 2053 CBC Auto Differential [[id number]]    (Abnormal)   Blood    Component Value Units   WBC 13.0 High  K/uL   RBC 3.59 Low  M/uL   Hemoglobin 11.4 Low  g/dL   Hematocrit 34. 0 Low  %   MCV 94.8 fL   MCH 31.7 pg   MCHC 33.5 g/dL   RDW 13.1 %   Platelets 738 K/uL   MPV 10.0 fL   Neutrophils % 67.8 %   Lymphocytes % 20.0 %   Monocytes % 9.8 %   Eosinophils % 2.1 %   Basophils % 0.3 %   Neutrophils Absolute 8.8 High  K/uL   Lymphocytes Absolute 2.6 K/uL   Monocytes Absolute 1.3 K/uL   Eosinophils Absolute 0.3 K/uL   Basophils Absolute 0.0 K/uL             See pelvic ultrasound from\  12/15/21and 11/9/21    Reviewed hysterectomy operation report from 2016 (Dr. Denny Pisano and Dr. Carole Purcell)    ASSESSMENT AND PLAN   Diagnosis Orders   1.  Pelvic adhesions  CT ABDOMEN PELVIS W IV CONTRAST Additional Contrast? Radiologist Recommendation    BASIC METABOLIC PANEL   2. Adnexal mass  CT ABDOMEN PELVIS W IV CONTRAST Additional Contrast? Radiologist Recommendation    BASIC METABOLIC PANEL   3. Left ovarian cyst  CT ABDOMEN PELVIS W IV CONTRAST Additional Contrast? Radiologist Recommendation    BASIC METABOLIC PANEL     We discussed the cystic mass which is complex in detail. We discussed that this could represent a benign left ovarian cyst, cancerous left ovarian cyst, or severe adhesive disease as was noted at the time of her hysterectomy at which time she also had a sigmoid serosal injury which required repair. We discussed based on her previous operative report, this likely would not be a simple left oophorectomy but may be a somewhat complicated procedure requiring a significant amount of time off work. We discussed a variety of options including surgery now with the possibility of laparotomy, repeat ultrasound in 2 to 3 months to assess interval change in the cystic mass, CT scan of the abdomen and pelvis to see if this would further delineate the type of mass this is. Since her pain is resolved and she feels well she would like to have a CT scan performed and then will follow up in the office after the CT scan is completed. She will follow-up sooner if any significant pain returns.   Boby Sanchez MD

## 2022-01-07 DIAGNOSIS — E11.9 TYPE 2 DIABETES MELLITUS WITHOUT COMPLICATION, WITHOUT LONG-TERM CURRENT USE OF INSULIN (HCC): ICD-10-CM

## 2022-01-07 RX ORDER — ATORVASTATIN CALCIUM 40 MG/1
TABLET, FILM COATED ORAL
Qty: 90 TABLET | Refills: 0 | Status: SHIPPED | OUTPATIENT
Start: 2022-01-07 | End: 2022-04-11

## 2022-02-07 RX ORDER — ACETAMINOPHEN AND CODEINE PHOSPHATE 120; 12 MG/5ML; MG/5ML
1 SOLUTION ORAL DAILY
Qty: 28 TABLET | Refills: 0 | Status: SHIPPED | OUTPATIENT
Start: 2022-02-07 | End: 2022-02-22

## 2022-02-22 ENCOUNTER — OFFICE VISIT (OUTPATIENT)
Dept: OBGYN | Age: 47
End: 2022-02-22
Payer: COMMERCIAL

## 2022-02-22 VITALS
DIASTOLIC BLOOD PRESSURE: 71 MMHG | SYSTOLIC BLOOD PRESSURE: 147 MMHG | WEIGHT: 171 LBS | HEIGHT: 65 IN | BODY MASS INDEX: 28.49 KG/M2

## 2022-02-22 DIAGNOSIS — Z12.31 ENCOUNTER FOR SCREENING MAMMOGRAM FOR MALIGNANT NEOPLASM OF BREAST: ICD-10-CM

## 2022-02-22 DIAGNOSIS — Z87.42 HISTORY OF MENORRHAGIA: ICD-10-CM

## 2022-02-22 DIAGNOSIS — Z01.419 WOMEN'S ANNUAL ROUTINE GYNECOLOGICAL EXAMINATION: Primary | ICD-10-CM

## 2022-02-22 PROCEDURE — 99396 PREV VISIT EST AGE 40-64: CPT | Performed by: NURSE PRACTITIONER

## 2022-02-22 RX ORDER — ACETAMINOPHEN AND CODEINE PHOSPHATE 120; 12 MG/5ML; MG/5ML
1 SOLUTION ORAL DAILY
Qty: 28 TABLET | Refills: 11 | Status: SHIPPED | OUTPATIENT
Start: 2022-02-22

## 2022-02-22 SDOH — ECONOMIC STABILITY: TRANSPORTATION INSECURITY
IN THE PAST 12 MONTHS, HAS LACK OF TRANSPORTATION KEPT YOU FROM MEETINGS, WORK, OR FROM GETTING THINGS NEEDED FOR DAILY LIVING?: NO

## 2022-02-22 SDOH — ECONOMIC STABILITY: TRANSPORTATION INSECURITY
IN THE PAST 12 MONTHS, HAS THE LACK OF TRANSPORTATION KEPT YOU FROM MEDICAL APPOINTMENTS OR FROM GETTING MEDICATIONS?: NO

## 2022-02-22 SDOH — ECONOMIC STABILITY: INCOME INSECURITY: IN THE LAST 12 MONTHS, WAS THERE A TIME WHEN YOU WERE NOT ABLE TO PAY THE MORTGAGE OR RENT ON TIME?: NO

## 2022-02-22 SDOH — ECONOMIC STABILITY: INCOME INSECURITY: HOW HARD IS IT FOR YOU TO PAY FOR THE VERY BASICS LIKE FOOD, HOUSING, MEDICAL CARE, AND HEATING?: NOT HARD AT ALL

## 2022-02-22 SDOH — ECONOMIC STABILITY: FOOD INSECURITY: WITHIN THE PAST 12 MONTHS, YOU WORRIED THAT YOUR FOOD WOULD RUN OUT BEFORE YOU GOT MONEY TO BUY MORE.: NEVER TRUE

## 2022-02-22 SDOH — ECONOMIC STABILITY: HOUSING INSECURITY
IN THE LAST 12 MONTHS, WAS THERE A TIME WHEN YOU DID NOT HAVE A STEADY PLACE TO SLEEP OR SLEPT IN A SHELTER (INCLUDING NOW)?: NO

## 2022-02-22 SDOH — ECONOMIC STABILITY: FOOD INSECURITY: WITHIN THE PAST 12 MONTHS, THE FOOD YOU BOUGHT JUST DIDN'T LAST AND YOU DIDN'T HAVE MONEY TO GET MORE.: NEVER TRUE

## 2022-02-22 ASSESSMENT — ENCOUNTER SYMPTOMS
GASTROINTESTINAL NEGATIVE: 1
RESPIRATORY NEGATIVE: 1

## 2022-02-22 NOTE — PROGRESS NOTES
2/22/22    Bubbarafy Eddy Qian  1975    Chief Complaint   Patient presents with    Gynecologic Exam     pt here for annual, had hyster, has ovaries, is sexually active, last pap-1/12/21-normal, mammo-2019-normal. no c/o. requesting refill on ocp. The patient is a 55 y.o. female, Nilam Cruz who presents for her annual exam.  She amenorrheic with OCPs. She is  sexually active. She is currently taking birth control. Birth control method is oral contraceptive. She reports no gynecological symptoms. Pap smear history: Her last PAP smear was in 1/2021. Her results were normal.    Breast history: her most recent mammogram was in 2021. The results were: Normal    Past Medical History:   Diagnosis Date    Abnormal uterine bleeding     Cough     Occ.  Productive Cough Clear Sputum    Diabetes mellitus ([address] Dx 2006 Or 2007    Dysmenorrhea     Fibroids     Hypertension     \"on medication for high blood pressure of 6- 7 yrs\"    Pelvic pain     Sinus problem     Teeth missing     Upper And Lower    Viral gastroenteritis     [place] teeth extracted     One [place] Tooth Extracted In Past       Past Surgical History:   Procedure Laterality Date    CHOLECYSTECTOMY, LAPAROSCOPIC  7-7-15    COLONOSCOPY  2000's    ENDOMETRIAL ABLATION  2012    Tubal Ligation Also Done    HYSTERECTOMY  4/19/16    supracervical ABD hysterectomy/ repair of serosal tear of bowel    TUBAL LIGATION  2012    Done With Endometrial Ablation    WISDOM TOOTH EXTRACTION      One [place] Tooth Extracted In Past       Family History   Problem Relation Age of Onset    Arthritis Mother     Diabetes Maternal Uncle     Heart Attack Maternal Grandfather     Diabetes Maternal Grandfather        Social History     Tobacco Use    Smoking status: Light Tobacco Smoker     Packs/day: 0.00     Years: 16.00     Pack years: 0.00     Start date: 4/15/2000    Smokeless tobacco: Never Used    Tobacco comment: \"I Smoke Black And Mild, They Are Like Little Cigars, Only Smoke Occ\"   Vaping Use    Vaping Use: Never used   Substance Use Topics    Alcohol use: Yes     Comment: \"Occ. A Couple Times A Week\"    Drug use: No       Current Outpatient Medications   Medication Sig Dispense Refill    norethindrone (MAHENDRA) 0.35 MG tablet Take 1 tablet by mouth daily 28 tablet 11    atorvastatin (LIPITOR) 40 MG tablet Take 1 tablet by mouth once daily 90 tablet 0    metFORMIN (GLUCOPHAGE) 500 MG tablet TAKE 2 TABLETS BY MOUTH TWICE DAILY WITH MEALS 360 tablet 3    FLUoxetine (PROZAC) 10 MG capsule Take 1 capsule by mouth once daily 90 capsule 3    lisinopril (PRINIVIL;ZESTRIL) 20 MG tablet Take 1 tablet by mouth daily 90 tablet 3    etodolac (LODINE) 500 MG tablet Take 500 mg by mouth 2 times daily (Patient not taking: Reported on 2022)      ketorolac (TORADOL) 10 MG tablet Take 1 tablet by mouth every 6 hours as needed for Pain (Patient not taking: Reported on 2022) 20 tablet 0    amLODIPine (NORVASC) 5 MG tablet Take 1 tablet by mouth once daily (Patient not taking: Reported on 2021) 90 tablet 3    Multiple Vitamins-Minerals (MULTI COMPLETE PO) Take by mouth (Patient not taking: Reported on 2021)      blood glucose monitor strips Test one time a day & as needed for symptoms of irregular blood glucose. Dispense sufficient amount for indicated testing frequency plus additional to accommodate PRN testing needs. (Patient not taking: Reported on 2021) 100 strip 3    Lancets MISC 1 each by Does not apply route daily 100 each 3     No current facility-administered medications for this visit.        Allergies   Allergen Reactions    Pravachol [Pravastatin] Nausea Only           Immunization History   Administered Date(s) Administered    COVID-19, Pfizer Purple top, DILUTE for use, 12+ yrs, 30mcg/0.3mL dose 2020, 2021, 2021    Hepatitis B 2020    Influenza A ([id number]) Vaccine PF IM 2009  Influenza Virus Vaccine 01/11/2013, 12/16/2013, 10/29/2015, 11/13/2017    Influenza, Intradermal, Preservative free 11/18/2019    Influenza, [place], IM, (6 mo and older Fluzone, Flulaval, Fluarix and 3 yrs and older Afluria) 11/13/2017    Influenza, Quadv, IM, PF (6 mo and older Fluzone, Flulaval, Fluarix, and 3 yrs and older Afluria) 10/29/2015, 11/05/2018    Pneumococcal Polysaccharide ([id number]) 11/01/2015    Tdap (Boostrix, Adacel) 06/12/2017       Review of Systems   Constitutional: Negative. Respiratory: Negative. Gastrointestinal: Negative. Genitourinary: Negative. BP (!) 147/71   Ht 5' 4.5\" (1.638 m)   Wt 171 lb (77.6 kg)   LMP  (LMP Unknown)   BMI 28.90 kg/m²     Physical Exam  Vitals and nursing note reviewed. Constitutional:       Appearance: Normal appearance. She is normal weight. HENT:      Head: Normocephalic. Pulmonary:      Effort: Pulmonary effort is normal.   Chest:   Breasts:      Right: Normal.      Left: Normal.       Abdominal:      Palpations: Abdomen is soft. Genitourinary:     General: Normal vulva. Vagina: Normal.      Cervix: Normal.      Adnexa: Right adnexa normal and left adnexa normal.      Rectum: Normal.      Comments: Partial removal of uterus   Musculoskeletal:         General: Normal range of motion. Cervical back: Normal range of motion. Skin:     General: Skin is warm and dry. Neurological:      Mental Status: She is alert. Psychiatric:         Attention and Perception: Attention normal.         Mood and Affect: Mood normal.         No results found for this visit on 02/22/22. Assessment and Plan   Diagnosis Orders   1. Women's annual routine gynecological examination     2. History of menorrhagia         Return in about 1 year (around 2/22/2023).     Camilo Lee, APRN - CNP

## 2022-02-28 ENCOUNTER — HOSPITAL ENCOUNTER (OUTPATIENT)
Dept: MAMMOGRAPHY | Age: 47
Discharge: HOME | End: 2022-02-28
Payer: COMMERCIAL

## 2022-02-28 DIAGNOSIS — Z12.31 ENCOUNTER FOR SCREENING MAMMOGRAM FOR MALIGNANT NEOPLASM OF BREAST: ICD-10-CM

## 2022-02-28 PROCEDURE — 77063 BREAST TOMOSYNTHESIS BI: CPT

## 2022-04-11 DIAGNOSIS — E11.9 TYPE 2 DIABETES MELLITUS WITHOUT COMPLICATION, WITHOUT LONG-TERM CURRENT USE OF INSULIN (HCC): ICD-10-CM

## 2022-04-11 RX ORDER — ATORVASTATIN CALCIUM 40 MG/1
TABLET, FILM COATED ORAL
Qty: 90 TABLET | Refills: 3 | Status: SHIPPED | OUTPATIENT
Start: 2022-04-11

## 2022-07-26 ENCOUNTER — NURSE TRIAGE (OUTPATIENT)
Dept: OTHER | Facility: CLINIC | Age: 47
End: 2022-07-26

## 2022-07-26 NOTE — TELEPHONE ENCOUNTER
Subjective: Caller states \"sore throat\"     Current Symptoms: sore throat and cough, lots of clear drainage in last several days, symptoms started during night, denies SOB, denies chest pain, denies ear pain, denies white patches on tonsils    Onset: 1 day ago; sudden    Associated Symptoms: reduced activity, increased wakefulness    Pain Severity: 0/10; N/A; none    Temperature: denies     What has been tried: mucinex this am-unsure if heled    LMP:  continuous BC  Pregnant: No    Recommended disposition: See in Office Today    Care advice provided, patient verbalizes understanding; denies any other questions or concerns; instructed to call back for any new or worsening symptoms. Patient/caller agrees to follow-up with PCP     Attention Provider: Thank you for allowing me to participate in the care of your patient. The patient was connected to triage in response to symptoms provided. Please do not respond through this encounter as the response is not directed to a shared pool.       Reason for Disposition   Diabetes mellitus or weak immune system (e.g., HIV positive, cancer chemo, splenectomy, organ transplant, chronic steroids)    Protocols used: Sore Throat-ADULT-[place]

## 2022-08-01 DIAGNOSIS — E11.9 TYPE 2 DIABETES MELLITUS WITHOUT COMPLICATION, WITHOUT LONG-TERM CURRENT USE OF INSULIN (HCC): ICD-10-CM

## 2022-08-01 RX ORDER — LISINOPRIL 20 MG/1
TABLET ORAL
Qty: 90 TABLET | Refills: 0 | Status: SHIPPED | OUTPATIENT
Start: 2022-08-01 | End: 2022-11-02

## 2022-09-08 DIAGNOSIS — F41.9 ANXIETY: ICD-10-CM

## 2022-09-08 RX ORDER — FLUOXETINE 10 MG/1
CAPSULE ORAL
Qty: 90 CAPSULE | Refills: 0 | Status: SHIPPED | OUTPATIENT
Start: 2022-09-08

## 2022-11-02 DIAGNOSIS — E11.9 TYPE 2 DIABETES MELLITUS WITHOUT COMPLICATION, WITHOUT LONG-TERM CURRENT USE OF INSULIN (HCC): ICD-10-CM

## 2022-11-02 RX ORDER — LISINOPRIL 20 MG/1
TABLET ORAL
Qty: 90 TABLET | Refills: 0 | Status: SHIPPED | OUTPATIENT
Start: 2022-11-02

## 2022-11-22 DIAGNOSIS — I10 ESSENTIAL HYPERTENSION: ICD-10-CM

## 2022-11-22 RX ORDER — AMLODIPINE BESYLATE 5 MG/1
TABLET ORAL
Qty: 90 TABLET | Refills: 0 | Status: SHIPPED | OUTPATIENT
Start: 2022-11-22

## 2022-12-05 DIAGNOSIS — E11.9 TYPE 2 DIABETES MELLITUS WITHOUT COMPLICATION, WITHOUT LONG-TERM CURRENT USE OF INSULIN (HCC): ICD-10-CM

## 2022-12-14 DIAGNOSIS — F41.9 ANXIETY: ICD-10-CM

## 2022-12-15 RX ORDER — FLUOXETINE 10 MG/1
CAPSULE ORAL
Qty: 90 CAPSULE | Refills: 0 | Status: SHIPPED | OUTPATIENT
Start: 2022-12-15

## 2023-01-10 ENCOUNTER — HOSPITAL ENCOUNTER (OUTPATIENT)
Dept: MAMMOGRAPHY | Age: 48
Discharge: HOME | End: 2023-01-10
Payer: COMMERCIAL

## 2023-01-10 DIAGNOSIS — Z12.31 SCREENING MAMMOGRAM, ENCOUNTER FOR: ICD-10-CM

## 2023-01-10 PROCEDURE — 77063 BREAST TOMOSYNTHESIS BI: CPT

## 2023-01-11 ENCOUNTER — OFFICE VISIT (OUTPATIENT)
Dept: FAMILY MEDICINE CLINIC | Age: 48
End: 2023-01-11
Payer: COMMERCIAL

## 2023-01-11 VITALS
RESPIRATION RATE: 18 BRPM | OXYGEN SATURATION: 98 % | SYSTOLIC BLOOD PRESSURE: 122 MMHG | BODY MASS INDEX: 27.85 KG/M2 | HEART RATE: 100 BPM | WEIGHT: 164.8 LBS | DIASTOLIC BLOOD PRESSURE: 70 MMHG | TEMPERATURE: 97.3 F

## 2023-01-11 DIAGNOSIS — I10 ESSENTIAL HYPERTENSION: ICD-10-CM

## 2023-01-11 DIAGNOSIS — Z13.220 SCREENING FOR HYPERLIPIDEMIA: ICD-10-CM

## 2023-01-11 DIAGNOSIS — E11.9 TYPE 2 DIABETES MELLITUS WITHOUT COMPLICATION, WITHOUT LONG-TERM CURRENT USE OF INSULIN (HCC): Primary | ICD-10-CM

## 2023-01-11 DIAGNOSIS — Z12.11 COLON CANCER SCREENING: ICD-10-CM

## 2023-01-11 DIAGNOSIS — F41.9 ANXIETY: ICD-10-CM

## 2023-01-11 LAB
BASOPHILS ABSOLUTE: 0.1 K/UL (ref 0–0.2)
BASOPHILS RELATIVE PERCENT: 0.5 %
EOSINOPHILS ABSOLUTE: 0.3 K/UL (ref 0–0.6)
EOSINOPHILS RELATIVE PERCENT: 2 %
HBA1C MFR BLD: 11.4 %
HCT VFR BLD CALC: 38.6 % (ref 36–48)
HEMOGLOBIN: 12.3 G/DL (ref 12–16)
LYMPHOCYTES ABSOLUTE: 3.8 K/UL (ref 1–5.1)
LYMPHOCYTES RELATIVE PERCENT: 29.1 %
MCH RBC QN AUTO: 30.2 PG (ref 26–34)
MCHC RBC AUTO-ENTMCNC: 31.8 G/DL (ref 31–36)
MCV RBC AUTO: 94.9 FL (ref 80–100)
MONOCYTES ABSOLUTE: 1.1 K/UL (ref 0–1.3)
MONOCYTES RELATIVE PERCENT: 8.7 %
NEUTROPHILS ABSOLUTE: 7.8 K/UL (ref 1.7–7.7)
NEUTROPHILS RELATIVE PERCENT: 59.7 %
PDW BLD-RTO: 13.1 % (ref 12.4–15.4)
PLATELET # BLD: 314 K/UL (ref 135–450)
PMV BLD AUTO: 10.2 FL (ref 5–10.5)
RBC # BLD: 4.07 M/UL (ref 4–5.2)
WBC # BLD: 13.1 K/UL (ref 4–11)

## 2023-01-11 PROCEDURE — 3074F SYST BP LT 130 MM HG: CPT | Performed by: NURSE PRACTITIONER

## 2023-01-11 PROCEDURE — 3046F HEMOGLOBIN A1C LEVEL >9.0%: CPT | Performed by: NURSE PRACTITIONER

## 2023-01-11 PROCEDURE — 99214 OFFICE O/P EST MOD 30 MIN: CPT | Performed by: NURSE PRACTITIONER

## 2023-01-11 PROCEDURE — 83036 HEMOGLOBIN GLYCOSYLATED A1C: CPT | Performed by: NURSE PRACTITIONER

## 2023-01-11 PROCEDURE — 3078F DIAST BP <80 MM HG: CPT | Performed by: NURSE PRACTITIONER

## 2023-01-11 RX ORDER — GLIMEPIRIDE 4 MG/1
TABLET ORAL
Qty: 90 TABLET | Refills: 1 | Status: SHIPPED | OUTPATIENT
Start: 2023-01-11 | End: 2023-04-18

## 2023-01-11 RX ORDER — ATORVASTATIN CALCIUM 40 MG/1
TABLET, FILM COATED ORAL
Qty: 90 TABLET | Refills: 3 | Status: SHIPPED | OUTPATIENT
Start: 2023-01-11

## 2023-01-11 RX ORDER — FLUOXETINE HYDROCHLORIDE 20 MG/1
20 CAPSULE ORAL DAILY
Qty: 90 CAPSULE | Refills: 1 | Status: SHIPPED | OUTPATIENT
Start: 2023-01-11

## 2023-01-11 RX ORDER — HYDROXYZINE HYDROCHLORIDE 10 MG/1
10 TABLET, FILM COATED ORAL 3 TIMES DAILY PRN
Qty: 90 TABLET | Refills: 1 | Status: SHIPPED | OUTPATIENT
Start: 2023-01-11 | End: 2023-01-21

## 2023-01-11 RX ORDER — LISINOPRIL 20 MG/1
TABLET ORAL
Qty: 90 TABLET | Refills: 1 | Status: SHIPPED | OUTPATIENT
Start: 2023-01-11

## 2023-01-11 RX ORDER — AMLODIPINE BESYLATE 5 MG/1
TABLET ORAL
Qty: 90 TABLET | Refills: 1 | Status: SHIPPED | OUTPATIENT
Start: 2023-01-11

## 2023-01-11 ASSESSMENT — ENCOUNTER SYMPTOMS
NAUSEA: 0
SHORTNESS OF BREATH: 0
DIARRHEA: 0
WHEEZING: 0
ABDOMINAL PAIN: 0
COUGH: 0
VOMITING: 0
CONSTIPATION: 0
CHEST TIGHTNESS: 0

## 2023-01-11 ASSESSMENT — PATIENT HEALTH QUESTIONNAIRE - PHQ9
2. FEELING DOWN, DEPRESSED OR HOPELESS: 0
SUM OF ALL RESPONSES TO PHQ QUESTIONS 1-9: 0
SUM OF ALL RESPONSES TO PHQ QUESTIONS 1-9: 0
SUM OF ALL RESPONSES TO PHQ9 QUESTIONS 1 & 2: 0
1. LITTLE INTEREST OR PLEASURE IN DOING THINGS: 0
SUM OF ALL RESPONSES TO PHQ QUESTIONS 1-9: 0
SUM OF ALL RESPONSES TO PHQ QUESTIONS 1-9: 0

## 2023-01-11 ASSESSMENT — ANXIETY QUESTIONNAIRES
4. TROUBLE RELAXING: 1
6. BECOMING EASILY ANNOYED OR IRRITABLE: 1
5. BEING SO RESTLESS THAT IT IS HARD TO SIT STILL: 1
7. FEELING AFRAID AS IF SOMETHING AWFUL MIGHT HAPPEN: 1
2. NOT BEING ABLE TO STOP OR CONTROL WORRYING: 1
3. WORRYING TOO MUCH ABOUT DIFFERENT THINGS: 1

## 2023-01-11 NOTE — ASSESSMENT & PLAN NOTE
Not checking blood sugar regularly. When she has checked it has been 180-200s. Denies polyuria, polydipsia, polyphagia.

## 2023-01-11 NOTE — ASSESSMENT & PLAN NOTE
Anxiety remains high. Worse in the evenings. Still trying to get her aunt's house sold.    RAMOS-7 SCREENING 1/11/2023 4/12/2021 3/11/2021   Not being able to stop or control worrying Several days - -   Worrying too much about different things Several days - -   Trouble relaxing Several days - -   Being so restless that it is hard to sit still Several days - -   Becoming easily annoyed or irritable Several days - -   Feeling afraid as if something awful might happen Several days - -   Feeling nervous, anxious, or on edge - 2-Over half the days 3-Nearly every day   Not able to stop or control worrying - 0-Not at all 3-Nearly every day   Worrying too much about different things - 1-Several days 3-Nearly every day   Trouble relaxing - 0-Not at all 3-Nearly every day   Being so restless that it's hard to sit still - 0-Not at all 0-Not at all   Becoming easily annoyed or irritable - 0-Not at all 2-Over half the days   Feeling afraid as if something awful might happen - 1-Several days 3-Nearly every day   RAMOS-7 Total Score - 4 17

## 2023-01-11 NOTE — PROGRESS NOTES
SUBJECTIVE:    Josué Reyes Qian  1975  52 y.o.  female      Chief Complaint   Patient presents with    Medication Refill    Follow-up    Other     States that there is a hard knot close to her umbilicus. Unsure if it is scar tissue or possible hernia    Anxiety     Feels like anxiety is worse at night     HPI      Allergies   Allergen Reactions    Pravachol [Pravastatin] Nausea Only       Current Outpatient Medications   Medication Sig Dispense Refill    amLODIPine (NORVASC) 5 MG tablet Take 1 tablet by mouth once daily 90 tablet 1    atorvastatin (LIPITOR) 40 MG tablet Take 1 tablet by mouth once daily 90 tablet 3    FLUoxetine (PROZAC) 20 MG capsule Take 1 capsule by mouth daily 90 capsule 1    lisinopril (PRINIVIL;ZESTRIL) 20 MG tablet Take 1 tablet by mouth once daily 90 tablet 1    metFORMIN (GLUCOPHAGE) 500 MG tablet TAKE 2 TABLETS BY MOUTH TWICE DAILY WITH MEALS 360 tablet 1    hydrOXYzine HCl (ATARAX) 10 MG tablet Take 1 tablet by mouth 3 times daily as needed for Anxiety 90 tablet 1    glimepiride (AMARYL) 4 MG tablet Take 0.5 tablets by mouth every morning (before breakfast) for 7 days, THEN 1 tablet every morning (before breakfast). 90 tablet 1    norethindrone (MAHENDRA) 0.35 MG tablet Take 1 tablet by mouth daily 28 tablet 11    blood glucose monitor strips Test one time a day & as needed for symptoms of irregular blood glucose. Dispense sufficient amount for indicated testing frequency plus additional to accommodate PRN testing needs. (Patient not taking: No sig reported) 100 strip 3    Lancets MISC 1 each by Does not apply route daily (Patient not taking: Reported on 1/11/2023) 100 each 3     No current facility-administered medications for this visit. Past Medical History:   Diagnosis Date    Abnormal uterine bleeding     Cough     Occ.  Productive Cough Clear Sputum    Diabetes mellitus ([address] Dx 2006 Or 2007    Dysmenorrhea     Fibroids     Hypertension     \"on medication for high blood pressure of 6- 7 yrs\"    Pelvic pain     Sinus problem     Teeth missing     Upper And Lower    Viral gastroenteritis     Cartwright teeth extracted     One Cartwright Tooth Extracted In Past     Past Surgical History:   Procedure Laterality Date    CHOLECYSTECTOMY, LAPAROSCOPIC  7-7-15    COLONOSCOPY  2000's    ENDOMETRIAL ABLATION  2012    Tubal Ligation Also Done    HYSTERECTOMY (CERVIX STATUS UNKNOWN)  4/19/16    supracervical ABD hysterectomy/ repair of serosal tear of bowel    TUBAL LIGATION  2012    Done With Endometrial Ablation    WISDOM TOOTH EXTRACTION      One Cartwright Tooth Extracted In Past     Social History     Socioeconomic History    Marital status:      Spouse name: None    Number of children: None    Years of education: None    Highest education level: None   Tobacco Use    Smoking status: Light Smoker     Packs/day: 0.00     Years: 16.00     Pack years: 0.00     Types: Cigarettes     Start date: 4/15/2000    Smokeless tobacco: Never    Tobacco comments:     \"I Smoke Black And Mild, They Are Like Little Cigars, Only Smoke Occ\"   Vaping Use    Vaping Use: Never used   Substance and Sexual Activity    Alcohol use: Yes     Comment: \"Occ.  A Couple Times A Week\"    Drug use: No    Sexual activity: Yes     Partners: Male     Social Determinants of Health     Financial Resource Strain: Low Risk     Difficulty of Paying Living Expenses: Not hard at all   Food Insecurity: No Food Insecurity    Worried About [facility] in the Last Year: Never true    Ran Out of Food in the Last Year: Never true   Transportation Needs: No Transportation Needs    Lack of Transportation (Medical): No    Lack of Transportation (Non-Medical): No   Housing Stability: Unknown    Unable to Pay for Housing in the Last Year: No    Unstable Housing in the Last Year: No         Essential hypertension  Patient denies any exertional chest pain, dyspnea, palpitations, syncope, orthopnea, edema or paroxysmal nocturnal dyspnea. Type 2 diabetes mellitus without complication (HCC)  Not checking blood sugar regularly. When she has checked it has been 180-200s. Denies polyuria, polydipsia, polyphagia. Anxiety  Anxiety remains high. Worse in the evenings. Still trying to get her aunt's house sold. RAMOS-7 SCREENING 1/11/2023 4/12/2021 3/11/2021   Not being able to stop or control worrying Several days - -   Worrying too much about different things Several days - -   Trouble relaxing Several days - -   Being so restless that it is hard to sit still Several days - -   Becoming easily annoyed or irritable Several days - -   Feeling afraid as if something awful might happen Several days - -   Feeling nervous, anxious, or on edge - 2-Over half the days 3-Nearly every day   Not able to stop or control worrying - 0-Not at all 3-Nearly every day   Worrying too much about different things - 1-Several days 3-Nearly every day   Trouble relaxing - 0-Not at all 3-Nearly every day   Being so restless that it's hard to sit still - 0-Not at all 0-Not at all   Becoming easily annoyed or irritable - 0-Not at all 2-Over half the days   Feeling afraid as if something awful might happen - 1-Several days 3-Nearly every day   RAMOS-7 Total Score - 4 17             Review of Systems   Constitutional:  Negative for appetite change, chills, fatigue and unexpected weight change. Respiratory:  Negative for cough, chest tightness, shortness of breath and wheezing. Cardiovascular:  Negative for chest pain, palpitations and leg swelling. Gastrointestinal:  Negative for abdominal pain, constipation, diarrhea, nausea and vomiting. Musculoskeletal:  Negative for arthralgias. Neurological:  Negative for weakness, numbness and headaches. Hematological:  Negative for adenopathy. Psychiatric/Behavioral:  Negative for dysphoric mood. The patient is nervous/anxious.       OBJECTIVE:    /70   Pulse 100   Temp 97.3 °F (36.3 °C)   Resp 18   Wt 164 lb 12.8 oz (74.8 kg)   LMP  (LMP Unknown)   SpO2 98%   BMI 27.85 kg/m²     Physical Exam  Constitutional:       Appearance: Normal appearance. She is well-developed. HENT:      Head: Normocephalic and atraumatic. Right Ear: Hearing normal.      Left Ear: Hearing normal.      Nose: Nose normal.      Mouth/Throat:      Mouth: Mucous membranes are moist.   Neck:      Vascular: No carotid bruit or JVD. Cardiovascular:      Rate and Rhythm: Normal rate and regular rhythm. Heart sounds: Normal heart sounds. No murmur heard. No friction rub. No gallop. Pulmonary:      Effort: Pulmonary effort is normal. No respiratory distress. Breath sounds: Normal breath sounds. No wheezing, rhonchi or rales. Abdominal:      Palpations: Abdomen is soft. Hernia: A hernia is present. Hernia is present in the umbilical area. Musculoskeletal:         General: Normal range of motion. Cervical back: Normal range of motion and neck supple. Skin:     General: Skin is warm and dry. Neurological:      General: No focal deficit present. Mental Status: She is alert and oriented to person, place, and time. Psychiatric:         Mood and Affect: Mood is anxious. Behavior: Behavior normal. Behavior is cooperative. Thought Content: Thought content normal.       ASSESSMENT/PLAN:    1. Type 2 diabetes mellitus without complication, without long-term current use of insulin (HCC)  Continue metformin. Restart amaryl and titrate up. Monitor glucose. Follow up in two months--earlier if glucose not improving  - Microalbumin / creatinine urine ratio; Future  - POCT glycosylated hemoglobin (Hb A1C)  - atorvastatin (LIPITOR) 40 MG tablet; Take 1 tablet by mouth once daily  Dispense: 90 tablet; Refill: 3  - lisinopril (PRINIVIL;ZESTRIL) 20 MG tablet; Take 1 tablet by mouth once daily  Dispense: 90 tablet;  Refill: 1  - metFORMIN (GLUCOPHAGE) 500 MG tablet; TAKE 2 TABLETS BY MOUTH TWICE DAILY WITH MEALS  Dispense: 360 tablet; Refill: 1  - glimepiride (AMARYL) 4 MG tablet; Take 0.5 tablets by mouth every morning (before breakfast) for 7 days, THEN 1 tablet every morning (before breakfast). Dispense: 90 tablet; Refill: 1    2. Essential hypertension  Continue amlodipine.   - CBC with Auto Differential; Future  - Comprehensive Metabolic Panel; Future  - amLODIPine (NORVASC) 5 MG tablet; Take 1 tablet by mouth once daily  Dispense: 90 tablet; Refill: 1    3. Screening for hyperlipidemia  - Lipid Panel; Future    4. Anxiety  Prozac increased. Hydroxyzine PRN  - FLUoxetine (PROZAC) 20 MG capsule; Take 1 capsule by mouth daily  Dispense: 90 capsule; Refill: 1  - hydrOXYzine HCl (ATARAX) 10 MG tablet; Take 1 tablet by mouth 3 times daily as needed for Anxiety  Dispense: 90 tablet; Refill: 1    5. Colon cancer screening  - [place], [place], Gastroenterology, [facility]               Return in about 2 months (around 3/11/2023) for diabetes.       (Please note that portions of this note may have been completed with a voice recognition program. Efforts were made to edit the dictations but occasionally words aremis-transcribed.)

## 2023-01-12 DIAGNOSIS — N18.9 CHRONIC KIDNEY DISEASE, UNSPECIFIED CKD STAGE: ICD-10-CM

## 2023-01-12 DIAGNOSIS — D72.9 NEUTROPHILIA: Primary | ICD-10-CM

## 2023-01-12 DIAGNOSIS — E11.9 TYPE 2 DIABETES MELLITUS WITHOUT COMPLICATION, WITHOUT LONG-TERM CURRENT USE OF INSULIN (HCC): Primary | ICD-10-CM

## 2023-01-12 LAB
A/G RATIO: 1.9 (ref 1.1–2.2)
ALBUMIN SERPL-MCNC: 4.4 G/DL (ref 3.4–5)
ALP BLD-CCNC: 96 U/L (ref 40–129)
ALT SERPL-CCNC: 17 U/L (ref 10–40)
ANION GAP SERPL CALCULATED.3IONS-SCNC: 16 MMOL/L (ref 3–16)
AST SERPL-CCNC: 12 U/L (ref 15–37)
BILIRUB SERPL-MCNC: 0.7 MG/DL (ref 0–1)
BUN BLDV-MCNC: 16 MG/DL (ref 7–20)
CALCIUM SERPL-MCNC: 9.9 MG/DL (ref 8.3–10.6)
CHLORIDE BLD-SCNC: 99 MMOL/L (ref 99–110)
CHOLESTEROL, TOTAL: 82 MG/DL (ref 0–199)
CO2: 21 MMOL/L (ref 21–32)
CREAT SERPL-MCNC: 1.3 MG/DL (ref 0.6–1.1)
GFR SERPL CREATININE-BSD FRML MDRD: 51 ML/MIN/{1.73_M2}
GLUCOSE BLD-MCNC: 224 MG/DL (ref 70–99)
HDLC SERPL-MCNC: 41 MG/DL (ref 40–60)
LDL CHOLESTEROL CALCULATED: 22 MG/DL
POTASSIUM SERPL-SCNC: 5.1 MMOL/L (ref 3.5–5.1)
SODIUM BLD-SCNC: 136 MMOL/L (ref 136–145)
TOTAL PROTEIN: 6.7 G/DL (ref 6.4–8.2)
TRIGL SERPL-MCNC: 96 MG/DL (ref 0–150)
VLDLC SERPL CALC-MCNC: 19 MG/DL

## 2023-01-17 ENCOUNTER — TELEPHONE (OUTPATIENT)
Dept: GASTROENTEROLOGY | Age: 48
End: 2023-01-17

## 2023-01-17 NOTE — TELEPHONE ENCOUNTER
Called pt. In regards to a referral for a colon screening. Pt. Stated she would have to call back when she was off work.

## 2023-01-23 ENCOUNTER — TELEPHONE (OUTPATIENT)
Dept: FAMILY MEDICINE CLINIC | Age: 48
End: 2023-01-23

## 2023-01-23 NOTE — TELEPHONE ENCOUNTER
Patient called-she started her on the Mylinda Aver and she is wanting to know was she to continue the glimepiride with this medication-please advise

## 2023-01-24 ENCOUNTER — TELEPHONE (OUTPATIENT)
Dept: GASTROENTEROLOGY | Age: 48
End: 2023-01-24

## 2023-01-31 ENCOUNTER — HOSPITAL ENCOUNTER (OUTPATIENT)
Dept: ULTRASOUND IMAGING | Age: 48
Discharge: HOME | End: 2023-01-31
Payer: COMMERCIAL

## 2023-01-31 ENCOUNTER — HOSPITAL ENCOUNTER (OUTPATIENT)
Age: 48
Discharge: HOME | End: 2023-01-31
Payer: COMMERCIAL

## 2023-01-31 DIAGNOSIS — N18.31 STAGE 3A CHRONIC KIDNEY DISEASE (HCC): ICD-10-CM

## 2023-01-31 LAB
ANION GAP SERPL CALCULATED.3IONS-SCNC: 14 MMOL/L (ref 4–16)
BILIRUBIN URINE: NEGATIVE MG/DL
BLOOD, URINE: NEGATIVE
BUN BLDV-MCNC: 17 MG/DL (ref 6–23)
CALCIUM SERPL-MCNC: 9.6 MG/DL (ref 8.3–10.6)
CHLORIDE BLD-SCNC: 102 MMOL/L (ref 99–110)
CLARITY: CLEAR
CO2: 22 MMOL/L (ref 21–32)
COLOR: YELLOW
COMMENT UA: ABNORMAL
CREAT SERPL-MCNC: 1.3 MG/DL (ref 0.6–1.1)
CREAT UR-MCNC: 69 MG/DL (ref 28–217)
CREATININE URINE: 69 MG/DL (ref 28–217)
GFR SERPL CREATININE-BSD FRML MDRD: 51 ML/MIN/1.73M2
GLUCOSE FASTING: 100 MG/DL (ref 70–99)
GLUCOSE, URINE: >1000 MG/DL
KETONES, URINE: NEGATIVE MG/DL
LEUKOCYTE ESTERASE, URINE: NEGATIVE
MICROALBUMIN 24H UR-MCNC: 2.1 MG/DL
MICROALBUMIN/CREAT UR-RTO: 30.4 MG/G CREAT (ref 0–30)
NITRITE URINE, QUANTITATIVE: NEGATIVE
PH, URINE: 7 (ref 5–8)
POTASSIUM SERPL-SCNC: 5.4 MMOL/L (ref 3.5–5.1)
PROT/CREAT RATIO, UR: 0.3
PROTEIN UA: NEGATIVE MG/DL
SODIUM BLD-SCNC: 138 MMOL/L (ref 135–145)
SPECIFIC GRAVITY UA: 1.01 (ref 1–1.03)
URINE TOTAL PROTEIN: 17.7 MG/DL
UROBILINOGEN, URINE: 0.2 MG/DL (ref 0.2–1)

## 2023-01-31 PROCEDURE — 84156 ASSAY OF PROTEIN URINE: CPT

## 2023-01-31 PROCEDURE — 82570 ASSAY OF URINE CREATININE: CPT

## 2023-01-31 PROCEDURE — 80048 BASIC METABOLIC PNL TOTAL CA: CPT

## 2023-01-31 PROCEDURE — 36415 COLL VENOUS BLD VENIPUNCTURE: CPT

## 2023-01-31 PROCEDURE — 76775 US EXAM ABDO BACK WALL LIM: CPT

## 2023-01-31 PROCEDURE — 82043 UR ALBUMIN QUANTITATIVE: CPT

## 2023-01-31 PROCEDURE — 81003 URINALYSIS AUTO W/O SCOPE: CPT

## 2023-02-01 RX ORDER — POLYETHYLENE GLYCOL 3350, SODIUM SULFATE ANHYDROUS, SODIUM BICARBONATE, SODIUM CHLORIDE, POTASSIUM CHLORIDE 236; 22.74; 6.74; 5.86; 2.97 G/4L; G/4L; G/4L; G/4L; G/4L
4 POWDER, FOR SOLUTION ORAL ONCE
Qty: 4000 ML | Refills: 0 | Status: CANCELLED | OUTPATIENT
Start: 2023-02-01 | End: 2023-02-01

## 2023-02-06 ENCOUNTER — TELEPHONE (OUTPATIENT)
Dept: GASTROENTEROLOGY | Age: 48
End: 2023-02-06

## 2023-02-06 RX ORDER — POLYETHYLENE GLYCOL 3350, SODIUM SULFATE ANHYDROUS, SODIUM BICARBONATE, SODIUM CHLORIDE, POTASSIUM CHLORIDE 236; 22.74; 6.74; 5.86; 2.97 G/4L; G/4L; G/4L; G/4L; G/4L
4000 POWDER, FOR SOLUTION ORAL ONCE
Qty: 4000 ML | Refills: 0 | Status: SHIPPED | OUTPATIENT
Start: 2023-02-06 | End: 2023-02-06

## 2023-02-15 RX ORDER — ACETAMINOPHEN AND CODEINE PHOSPHATE 120; 12 MG/5ML; MG/5ML
1 SOLUTION ORAL DAILY
Qty: 28 TABLET | Refills: 0 | Status: SHIPPED | OUTPATIENT
Start: 2023-02-15

## 2023-02-28 ENCOUNTER — OFFICE VISIT (OUTPATIENT)
Dept: OBGYN | Age: 48
End: 2023-02-28
Payer: COMMERCIAL

## 2023-02-28 VITALS
BODY MASS INDEX: 27.31 KG/M2 | DIASTOLIC BLOOD PRESSURE: 74 MMHG | SYSTOLIC BLOOD PRESSURE: 111 MMHG | HEIGHT: 64 IN | HEART RATE: 95 BPM | WEIGHT: 160 LBS

## 2023-02-28 DIAGNOSIS — Z01.419 WOMEN'S ANNUAL ROUTINE GYNECOLOGICAL EXAMINATION: Primary | ICD-10-CM

## 2023-02-28 PROCEDURE — 3074F SYST BP LT 130 MM HG: CPT | Performed by: NURSE PRACTITIONER

## 2023-02-28 PROCEDURE — 3078F DIAST BP <80 MM HG: CPT | Performed by: NURSE PRACTITIONER

## 2023-02-28 PROCEDURE — 99396 PREV VISIT EST AGE 40-64: CPT | Performed by: NURSE PRACTITIONER

## 2023-02-28 RX ORDER — ACETAMINOPHEN AND CODEINE PHOSPHATE 120; 12 MG/5ML; MG/5ML
1 SOLUTION ORAL DAILY
Qty: 28 TABLET | Refills: 11 | Status: SHIPPED | OUTPATIENT
Start: 2023-02-28

## 2023-02-28 SDOH — ECONOMIC STABILITY: FOOD INSECURITY: WITHIN THE PAST 12 MONTHS, THE FOOD YOU BOUGHT JUST DIDN'T LAST AND YOU DIDN'T HAVE MONEY TO GET MORE.: NEVER TRUE

## 2023-02-28 SDOH — ECONOMIC STABILITY: FOOD INSECURITY: WITHIN THE PAST 12 MONTHS, YOU WORRIED THAT YOUR FOOD WOULD RUN OUT BEFORE YOU GOT MONEY TO BUY MORE.: NEVER TRUE

## 2023-02-28 SDOH — ECONOMIC STABILITY: INCOME INSECURITY: HOW HARD IS IT FOR YOU TO PAY FOR THE VERY BASICS LIKE FOOD, HOUSING, MEDICAL CARE, AND HEATING?: NOT HARD AT ALL

## 2023-02-28 ASSESSMENT — ENCOUNTER SYMPTOMS
ABDOMINAL PAIN: 0
CONSTIPATION: 0
SHORTNESS OF BREATH: 0
GASTROINTESTINAL NEGATIVE: 1
RESPIRATORY NEGATIVE: 1
VOMITING: 0
DIARRHEA: 0
NAUSEA: 0

## 2023-02-28 NOTE — PROGRESS NOTES
2/28/23    Clara S Qian  1975    Chief Complaint   Patient presents with    Gynecologic Exam     Pt here for annual, had supracervical hysterectomy, amenorrheic d/t ocp, pap-1/21-neg, mammo-1/23-neg. requesting refill on Dorothy. No c/o today. The patient is a 52 y.o. female, Rosalind Ward who presents for her annual exam.  Partial hysterectomy. She  amenorrheic due to OCP . She is sexually active. .    She reports no gynecological symptoms. Pap smear history: Her last PAP smear was in 2021. Her results were normal.    Breast history: her most recent mammogram was in 2023. The results were: Normal    Past Medical History:   Diagnosis Date    Abnormal uterine bleeding     Cough     Occ.  Productive Cough Clear Sputum    Diabetes mellitus ([address] Dx 2006 Or 2007    Dysmenorrhea     Fibroids     Hypertension     \"on medication for high blood pressure of 6- 7 yrs\"    Pelvic pain     Sinus problem     Stage 3a chronic kidney disease (CKD) ([address]     Teeth missing     Upper And Lower    Viral gastroenteritis     [place] teeth extracted     One [place] Tooth Extracted In Past       Past Surgical History:   Procedure Laterality Date    CHOLECYSTECTOMY, LAPAROSCOPIC  7-7-15    COLONOSCOPY  2000's    ENDOMETRIAL ABLATION  2012    Tubal Ligation Also Done    HYSTERECTOMY (CERVIX STATUS UNKNOWN)  4/19/16    supracervical ABD hysterectomy/ repair of serosal tear of bowel    TUBAL LIGATION  2012    Done With Endometrial Ablation    WISDOM TOOTH EXTRACTION      One Burdine Tooth Extracted In Past       Family History   Problem Relation Age of Onset    Arthritis Mother     Diabetes Maternal Uncle     Heart Attack Maternal Grandfather     Diabetes Maternal Grandfather     Breast Cancer Other [de-identified]    Ovarian Cancer Neg Hx        Social History     Tobacco Use    Smoking status: Light Smoker     Packs/day: 0.00     Years: 16.00     Pack years: 0.00     Types: Cigarettes     Start date: 4/15/2000    Smokeless tobacco: Never    Tobacco comments:     \"I Smoke Black And Mild, They Are Like Little Cigars, Only Smoke Occ\"   Vaping Use    Vaping Use: Never used   Substance Use Topics    Alcohol use: Yes     Comment: \"Occ. A Couple Times A Week\"    Drug use: No       Current Outpatient Medications   Medication Sig Dispense Refill    dapagliflozin (FARXIGA) 5 MG tablet Take 1 tablet by mouth every morning 90 tablet 1    amLODIPine (NORVASC) 5 MG tablet Take 1 tablet by mouth once daily 90 tablet 1    atorvastatin (LIPITOR) 40 MG tablet Take 1 tablet by mouth once daily 90 tablet 3    FLUoxetine (PROZAC) 20 MG capsule Take 1 capsule by mouth daily 90 capsule 1    lisinopril (PRINIVIL;ZESTRIL) 20 MG tablet Take 1 tablet by mouth once daily 90 tablet 1    metFORMIN (GLUCOPHAGE) 500 MG tablet TAKE 2 TABLETS BY MOUTH TWICE DAILY WITH MEALS 360 tablet 1    glimepiride (AMARYL) 4 MG tablet Take 0.5 tablets by mouth every morning (before breakfast) for 7 days, THEN 1 tablet every morning (before breakfast). 90 tablet 1    norethindrone (MAHENDRA) 0.35 MG tablet Take 1 tablet by mouth daily 28 tablet 11    blood glucose monitor strips Test one time a day & as needed for symptoms of irregular blood glucose. Dispense sufficient amount for indicated testing frequency plus additional to accommodate PRN testing needs. (Patient not taking: No sig reported) 100 strip 3    Lancets MISC 1 each by Does not apply route daily (Patient not taking: No sig reported) 100 each 3     No current facility-administered medications for this visit.        Allergies   Allergen Reactions    Pravachol [Pravastatin] Nausea Only           Immunization History   Administered Date(s) Administered    COVID-19, PFIZER PURPLE top, DILUTE for use, (age 15 y+), 30mcg/0.3mL 2020, 2021, 2021    Hepatitis B 2020    Influenza A ([id number]) Vaccine PF IM 2009    Influenza Virus Vaccine 2013, 2013, 10/29/2015, 2017    Influenza, AFLURIA (age 1 yrs+), FLUZONE, (age 10 mo+), MDV, 0.5mL 11/13/2017    Influenza, FLUARIX, FLULAVAL, FLUZONE (age 10 mo+) AND AFLURIA, (age 1 y+), PF, 0.5mL 10/29/2015, 11/05/2018    Influenza, Intradermal, Preservative free 11/18/2019    Pneumococcal Polysaccharide ([id number]) 11/01/2015    Tdap (Boostrix, Adacel) 06/12/2017       Review of Systems   Constitutional: Negative. Negative for fatigue. Respiratory: Negative. Negative for shortness of breath. Gastrointestinal: Negative. Negative for abdominal pain, constipation, diarrhea, nausea and vomiting. Genitourinary: Negative. Neurological:  Negative for dizziness. Psychiatric/Behavioral: Negative. /74 (Site: Left Upper Arm, Position: Sitting, Cuff Size: Large Adult)   Pulse 95   Ht 5' 4\" (1.626 m)   Wt 160 lb (72.6 kg)   LMP  (LMP Unknown)   BMI 27.46 kg/m²     Physical Exam  Vitals and nursing note reviewed. Constitutional:       Appearance: Normal appearance. She is normal weight. HENT:      Head: Normocephalic. Pulmonary:      Effort: Pulmonary effort is normal. No respiratory distress. Chest:   Breasts:     Right: Normal.      Left: Normal.   Abdominal:      Palpations: Abdomen is soft. Tenderness: There is no abdominal tenderness. Genitourinary:     General: Normal vulva. Exam position: Lithotomy position. Vagina: Normal.      Cervix: Normal.      Uterus: Absent. Adnexa: Right adnexa normal and left adnexa normal.      Rectum: Normal.   Musculoskeletal:         General: Normal range of motion. Cervical back: Normal and normal range of motion. Lumbar back: Normal.   Lymphadenopathy:      Cervical: No cervical adenopathy. Upper Body:      Right upper body: No supraclavicular or axillary adenopathy. Left upper body: No supraclavicular or axillary adenopathy. Lower Body: No right inguinal adenopathy. No left inguinal adenopathy. Skin:     General: Skin is warm and dry. Neurological:      General: No focal deficit present. Mental Status: She is alert and oriented to person, place, and time. Psychiatric:         Attention and Perception: Attention normal.         Mood and Affect: Mood normal.         Speech: Speech normal.         Behavior: Behavior is cooperative. Cognition and Memory: Cognition normal.         Judgment: Judgment normal.       No results found for this visit on 02/28/23. Assessment and Plan   Diagnosis Orders   1. Women's annual routine gynecological examination          Colonoscopy scheduled for 3/9/23    Return in about 1 year (around 2/28/2024).     TARYN Ye - CNP

## 2023-03-06 ENCOUNTER — TELEPHONE (OUTPATIENT)
Dept: FAMILY MEDICINE CLINIC | Age: 48
End: 2023-03-06

## 2023-03-06 NOTE — TELEPHONE ENCOUNTER
Patient called-she is scheduled for her colonoscopy on Thursday-but she is wanting to know her instructions are for her diabetic meds because she will not be eating-please advise

## 2023-03-06 NOTE — PROGRESS NOTES
.Surgery @ [facility] on 3/9/23 you will be called 3/8/23 with times               1. Follow the office instructions for the bowel prep. 2. Follow your directions as prescribed by the doctor for your procedure and medications. Morning of surgery take:  Amlodipine & Metformin with sips of water. 3. Check with your Doctor regarding stopping vitamins, supplements, blood thinners and follow their instructions. Stop vitamins, supplements and NSAIDS: Today   4. Do not smoke, vape or use chewing tobacco morning of surgery. Do not drink any alcoholic beverages 24 hours prior to surgery. This includes NA Beer. No street drugs 7 days prior to surgery. 5. You may brush your teeth and gargle the morning of surgery. DO NOT SWALLOW WATER   6. You MUST make arrangements for a responsible adult to take you home after your surgery and be able to check on you every couple                   hours for the day. You will not be allowed to leave alone or drive yourself home. It is strongly suggested someone stay with you the first 24                   hrs. Your surgery will be cancelled if you do not have a ride home. 7. Please wear simple, loose fitting clothing to the hospital.  Hank Reeves not bring valuables (money, credit cards, checkbooks, etc.) Do not wear any                   makeup (including no eye makeup) or nail polish on your fingers or toes. 8. DO NOT wear any jewelry or piercings on day of surgery. All body piercing jewelry must be removed. 9. If you have dentures, they will be removed before going to the OR; we will provide you a container. If you wear contact lenses or glasses,                  they will be removed; please bring a case for them. 10. If you  have a Living Will and Durable Power of  for Healthcare, please bring in a copy.            11. Please bring picture ID,  insurance card, paperwork from the doctors office    (H & P, Consent, & card for implantable devices).           12. Take a shower with soap the morning of surgery.                  No clipping or shaving prior to surgery. Do not apply any make-up, deodorant, lotion, oil or powder after the morning shower.           13.  Enter thru the main entrance on the day of surgery.

## 2023-03-07 ENCOUNTER — HOSPITAL ENCOUNTER (OUTPATIENT)
Age: 48
Discharge: HOME | End: 2023-03-07
Payer: COMMERCIAL

## 2023-03-07 ENCOUNTER — ANESTHESIA EVENT (OUTPATIENT)
Dept: ENDOSCOPY | Age: 48
End: 2023-03-07
Payer: COMMERCIAL

## 2023-03-07 LAB
ANION GAP SERPL CALCULATED.3IONS-SCNC: 10 MMOL/L (ref 4–16)
BILIRUBIN URINE: NEGATIVE MG/DL
BLOOD, URINE: NEGATIVE
BUN SERPL-MCNC: 16 MG/DL (ref 6–23)
CALCIUM SERPL-MCNC: 9.2 MG/DL (ref 8.3–10.6)
CHLORIDE BLD-SCNC: 104 MMOL/L (ref 99–110)
CLARITY: CLEAR
CO2: 24 MMOL/L (ref 21–32)
COLOR: YELLOW
COMMENT UA: ABNORMAL
CREAT SERPL-MCNC: 1.2 MG/DL (ref 0.6–1.1)
CREAT UR-MCNC: 57.2 MG/DL (ref 28–217)
CREATININE URINE: 57.2 MG/DL (ref 28–217)
GFR SERPL CREATININE-BSD FRML MDRD: 56 ML/MIN/1.73M2
GLUCOSE P FAST SERPL-MCNC: 126 MG/DL (ref 70–99)
GLUCOSE, URINE: >1000 MG/DL
KETONES, URINE: NEGATIVE MG/DL
LEUKOCYTE ESTERASE, URINE: NEGATIVE
MICROALBUMIN 24H UR-MCNC: 1.5 MG/DL
MICROALBUMIN/CREAT UR-RTO: 26.2 MG/G CREAT (ref 0–30)
NITRITE URINE, QUANTITATIVE: NEGATIVE
PH, URINE: 6 (ref 5–8)
POTASSIUM SERPL-SCNC: 4.8 MMOL/L (ref 3.5–5.1)
PROT/CREAT RATIO, UR: 0.2
PROTEIN UA: NEGATIVE MG/DL
SODIUM BLD-SCNC: 138 MMOL/L (ref 135–145)
SPECIFIC GRAVITY UA: 1.01 (ref 1–1.03)
URINE TOTAL PROTEIN: 12.6 MG/DL
UROBILINOGEN, URINE: 0.2 MG/DL (ref 0.2–1)

## 2023-03-07 PROCEDURE — 82043 UR ALBUMIN QUANTITATIVE: CPT

## 2023-03-07 PROCEDURE — 36415 COLL VENOUS BLD VENIPUNCTURE: CPT

## 2023-03-07 PROCEDURE — 82570 ASSAY OF URINE CREATININE: CPT

## 2023-03-07 PROCEDURE — 80048 BASIC METABOLIC PNL TOTAL CA: CPT

## 2023-03-07 PROCEDURE — 84156 ASSAY OF PROTEIN URINE: CPT

## 2023-03-07 PROCEDURE — 81003 URINALYSIS AUTO W/O SCOPE: CPT

## 2023-03-07 ASSESSMENT — LIFESTYLE VARIABLES: SMOKING_STATUS: 1

## 2023-03-07 NOTE — ANESTHESIA PRE PROCEDURE
Department of Anesthesiology  Preprocedure Note       Name:  Benji Mena   Age:  52 y.o.  :  1975                                          MRN:  [id number]         Date:  3/7/2023      Surgeon: Jose Shrestha):  La Mazariegos MD    Procedure: Procedure(s):  COLORECTAL CANCER SCREENING, NOT HIGH RISK    Medications prior to admission:   Prior to Admission medications    Medication Sig Start Date End Date Taking? Authorizing Provider   norethindrone (MAHENDRA) 0.35 MG tablet Take 1 tablet by mouth daily 23   TARYN De Leon CNP   dapagliflozin (FARXIGA) 5 MG tablet Take 1 tablet by mouth every morning 23   TARYN Marina CNP   amLODIPine (NORVASC) 5 MG tablet Take 1 tablet by mouth once daily 23   TARYN Marina CNP   atorvastatin (LIPITOR) 40 MG tablet Take 1 tablet by mouth once daily 23   TARYN Marina CNP   FLUoxetine (PROZAC) 20 MG capsule Take 1 capsule by mouth daily 23   TARYN Marina CNP   lisinopril (PRINIVIL;ZESTRIL) 20 MG tablet Take 1 tablet by mouth once daily 23   TARYN Marina CNP   metFORMIN (GLUCOPHAGE) 500 MG tablet TAKE 2 TABLETS BY MOUTH TWICE DAILY WITH MEALS 23   TARYN Marina CNP   glimepiride (AMARYL) 4 MG tablet Take 0.5 tablets by mouth every morning (before breakfast) for 7 days, THEN 1 tablet every morning (before breakfast). 23  TARYN Marina CNP   blood glucose monitor strips Test one time a day & as needed for symptoms of irregular blood glucose. Dispense sufficient amount for indicated testing frequency plus additional to accommodate PRN testing needs. Patient not taking: No sig reported 21   TARYN Marina CNP   Lancets MISC 1 each by Does not apply route daily  Patient not taking: No sig reported 21   TARYN Marina CNP       Current medications:    No current facility-administered medications for this encounter.      Current Outpatient Medications Medication Sig Dispense Refill    norethindrone (MAHENDRA) 0.35 MG tablet Take 1 tablet by mouth daily 28 tablet 11    dapagliflozin (FARXIGA) 5 MG tablet Take 1 tablet by mouth every morning 90 tablet 1    amLODIPine (NORVASC) 5 MG tablet Take 1 tablet by mouth once daily 90 tablet 1    atorvastatin (LIPITOR) 40 MG tablet Take 1 tablet by mouth once daily 90 tablet 3    FLUoxetine (PROZAC) 20 MG capsule Take 1 capsule by mouth daily 90 capsule 1    lisinopril (PRINIVIL;ZESTRIL) 20 MG tablet Take 1 tablet by mouth once daily 90 tablet 1    metFORMIN (GLUCOPHAGE) 500 MG tablet TAKE 2 TABLETS BY MOUTH TWICE DAILY WITH MEALS 360 tablet 1    glimepiride (AMARYL) 4 MG tablet Take 0.5 tablets by mouth every morning (before breakfast) for 7 days, THEN 1 tablet every morning (before breakfast). 90 tablet 1    blood glucose monitor strips Test one time a day & as needed for symptoms of irregular blood glucose. Dispense sufficient amount for indicated testing frequency plus additional to accommodate PRN testing needs. (Patient not taking: No sig reported) 100 strip 3    Lancets MISC 1 each by Does not apply route daily (Patient not taking: No sig reported) 100 each 3       Allergies: Allergies   Allergen Reactions    Pravachol [Pravastatin] Nausea Only       Problem List:    Patient Active Problem List   Diagnosis Code    Uterine fibroid D25.9    Abnormal uterine bleeding (AUB) N93.9    Type 2 diabetes mellitus without complication (HCC) I58.2    Essential hypertension I10    Anxiety F41.9       Past Medical History:        Diagnosis Date    Abnormal uterine bleeding     Cough     Occ.  Productive Cough Clear Sputum    Diabetes mellitus ([facility] [address] Dx 2006 Or 2007    Dysmenorrhea     Fibroids     Hyperlipidemia     Hypertension     \"on medication for high blood pressure of 6- 7 yrs\"    Pelvic pain     Sinus problem     Stage 3a chronic kidney disease (CKD) (HCC)     Teeth missing     Upper And Lower    Viral gastroenteritis     [place] teeth extracted     One [place] Tooth Extracted In Past       Past Surgical History:        Procedure Laterality Date    CHOLECYSTECTOMY, LAPAROSCOPIC  7-7-15    COLONOSCOPY  2000's    ENDOMETRIAL ABLATION  2012    Tubal Ligation Also Done    HYSTERECTOMY (CERVIX STATUS UNKNOWN)  4/19/16    supracervical ABD hysterectomy/ repair of serosal tear of bowel    TUBAL LIGATION  2012    Done With Endometrial Ablation    WISDOM TOOTH EXTRACTION      One [place] Tooth Extracted In Past       Social History:    Social History     Tobacco Use    Smoking status: Light Smoker     Packs/day: 0.00     Years: 16.00     Pack years: 0.00     Types: Cigarettes     Start date: 4/15/2000    Smokeless tobacco: Never    Tobacco comments:     \"I Smoke Black And Mild, They Are Like Little Cigars, Only Smoke Occ\"   Substance Use Topics    Alcohol use: Yes     Comment: \"Occ. A Couple Times A Week\"                                Ready to quit: Not Answered  Counseling given: Not Answered  Tobacco comments: \"I Smoke Black And Mild, They Are Like Little Cigars, Only Smoke Occ\"      Vital Signs (Current):   Vitals:    03/06/23 1513   Weight: 155 lb (70.3 kg)   Height: 5' 4\" (1.626 m)                                              BP Readings from Last 3 Encounters:   02/28/23 111/74   01/25/23 110/70   01/11/23 122/70       NPO Status:                                                                                 BMI:   Wt Readings from Last 3 Encounters:   02/28/23 160 lb (72.6 kg)   01/25/23 163 lb 3.2 oz (74 kg)   01/11/23 164 lb 12.8 oz (74.8 kg)     Body mass index is 26.61 kg/m².     CBC:   Lab Results   Component Value Date/Time    WBC 13.1 01/11/2023 03:48 PM    RBC 4.07 01/11/2023 03:48 PM    HGB 12.3 01/11/2023 03:48 PM    HCT 38.6 01/11/2023 03:48 PM    MCV 94.9 01/11/2023 03:48 PM    RDW 13.1 01/11/2023 03:48 PM     01/11/2023 03:48 PM       CMP:   Lab Results   Component Value Date/Time  01/31/2023 02:40 PM    K 5.4 01/31/2023 02:40 PM     01/31/2023 02:40 PM    CO2 22 01/31/2023 02:40 PM    BUN 17 01/31/2023 02:40 PM    CREATININE 1.3 01/31/2023 02:40 PM    GFRAA 58 12/23/2021 04:20 PM    AGRATIO 1.9 01/11/2023 03:48 PM    LABGLOM 51 01/31/2023 02:40 PM    GLUCOSE 224 01/11/2023 03:48 PM    PROT 6.7 01/11/2023 03:48 PM    CALCIUM 9.6 01/31/2023 02:40 PM    BILITOT 0.7 01/11/2023 03:48 PM    ALKPHOS 96 01/11/2023 03:48 PM    AST 12 01/11/2023 03:48 PM    ALT 17 01/11/2023 03:48 PM       POC Tests: No results for input(s): POCGLU, POCNA, POCK, POCCL, POCBUN, POCHEMO, POCHCT in the last 72 hours. Coags:   Lab Results   Component Value Date/Time    PROTIME 10.4 02/10/2016 06:20 PM    INR 0.94 02/10/2016 06:20 PM    APTT 26.6 02/10/2016 06:20 PM       HCG (If Applicable):   Lab Results   Component Value Date    PREGTESTUR NEGATIVE 07/07/2015        ABGs: No results found for: PHART, PO2ART, LOB5AAB, XHI3EZH, BEART, B5PMBGVU     Type & Screen (If Applicable):  No results found for: LABABO, LABRH    Drug/Infectious Status (If Applicable):  No results found for: HIV, HEPCAB    COVID-19 Screening (If Applicable): No results found for: COVID19        Anesthesia Evaluation  Patient summary reviewed no history of anesthetic complications:   Airway: Mallampati: I  TM distance: >3 FB   Neck ROM: full  Mouth opening: > = 3 FB   Dental: normal exam         Pulmonary:normal exam    (+) current smoker                           Cardiovascular:  Exercise tolerance: good (>4 METS),   (+) hypertension:,          Beta Blocker:  Not on Beta Blocker         Neuro/Psych:               GI/Hepatic/Renal:   (+) bowel prep,           Endo/Other:    (+) DiabetesType II DM, , .                 Abdominal:             Vascular: Other Findings:           Anesthesia Plan      MAC     ASA 2       Induction: intravenous. Anesthetic plan and risks discussed with patient.                         [address] [place] Sima Ashby - JOSEPHINE   3/7/2023

## 2023-03-08 NOTE — PROGRESS NOTES
Spoke with patient and she will arrive at 0730 at [facility] on 3/9/2023 for her procedure at 0900. IV order in epic, placed by Amanda Padgett.

## 2023-03-09 ENCOUNTER — HOSPITAL ENCOUNTER (OUTPATIENT)
Age: 48
Setting detail: OUTPATIENT SURGERY
Discharge: HOME | End: 2023-03-09
Attending: INTERNAL MEDICINE | Admitting: INTERNAL MEDICINE
Payer: COMMERCIAL

## 2023-03-09 ENCOUNTER — ANESTHESIA (OUTPATIENT)
Dept: ENDOSCOPY | Age: 48
End: 2023-03-09
Payer: COMMERCIAL

## 2023-03-09 VITALS
DIASTOLIC BLOOD PRESSURE: 66 MMHG | HEART RATE: 86 BPM | HEIGHT: 64 IN | RESPIRATION RATE: 18 BRPM | OXYGEN SATURATION: 98 % | BODY MASS INDEX: 26.46 KG/M2 | SYSTOLIC BLOOD PRESSURE: 105 MMHG | WEIGHT: 155 LBS | TEMPERATURE: 97.4 F

## 2023-03-09 PROBLEM — Z12.11 COLON CANCER SCREENING: Status: ACTIVE | Noted: 2023-03-09

## 2023-03-09 LAB — GLUCOSE BLD-MCNC: 166 MG/DL (ref 70–99)

## 2023-03-09 PROCEDURE — 7100000011 HC PHASE II RECOVERY - ADDTL 15 MIN: Performed by: INTERNAL MEDICINE

## 2023-03-09 PROCEDURE — 6360000002 HC RX W HCPCS: Performed by: NURSE ANESTHETIST, CERTIFIED REGISTERED

## 2023-03-09 PROCEDURE — 2580000003 HC RX 258: Performed by: ANESTHESIOLOGY

## 2023-03-09 PROCEDURE — 45378 DIAGNOSTIC COLONOSCOPY: CPT | Performed by: INTERNAL MEDICINE

## 2023-03-09 PROCEDURE — 3700000000 HC ANESTHESIA ATTENDED CARE: Performed by: INTERNAL MEDICINE

## 2023-03-09 PROCEDURE — 2709999900 HC NON-CHARGEABLE SUPPLY: Performed by: INTERNAL MEDICINE

## 2023-03-09 PROCEDURE — 3700000001 HC ADD 15 MINUTES (ANESTHESIA): Performed by: INTERNAL MEDICINE

## 2023-03-09 PROCEDURE — 3609027000 HC COLONOSCOPY: Performed by: INTERNAL MEDICINE

## 2023-03-09 PROCEDURE — 2500000003 HC RX 250 WO HCPCS: Performed by: NURSE ANESTHETIST, CERTIFIED REGISTERED

## 2023-03-09 PROCEDURE — 7100000010 HC PHASE II RECOVERY - FIRST 15 MIN: Performed by: INTERNAL MEDICINE

## 2023-03-09 PROCEDURE — 82962 GLUCOSE BLOOD TEST: CPT

## 2023-03-09 RX ORDER — PROPOFOL 10 MG/ML
INJECTION, EMULSION INTRAVENOUS PRN
Status: DISCONTINUED | OUTPATIENT
Start: 2023-03-09 | End: 2023-03-09 | Stop reason: SDUPTHER

## 2023-03-09 RX ORDER — LIDOCAINE HYDROCHLORIDE 20 MG/ML
INJECTION, SOLUTION INFILTRATION; PERINEURAL PRN
Status: DISCONTINUED | OUTPATIENT
Start: 2023-03-09 | End: 2023-03-09 | Stop reason: SDUPTHER

## 2023-03-09 RX ORDER — SODIUM CHLORIDE, SODIUM LACTATE, POTASSIUM CHLORIDE, CALCIUM CHLORIDE 600; 310; 30; 20 MG/100ML; MG/100ML; MG/100ML; MG/100ML
INJECTION, SOLUTION INTRAVENOUS CONTINUOUS
Status: DISCONTINUED | OUTPATIENT
Start: 2023-03-09 | End: 2023-03-09 | Stop reason: HOSPADM

## 2023-03-09 RX ADMIN — PROPOFOL 380 MG: 10 INJECTION, EMULSION INTRAVENOUS at 09:37

## 2023-03-09 RX ADMIN — LIDOCAINE HYDROCHLORIDE 80 MG: 20 INJECTION, SOLUTION EPIDURAL; INFILTRATION; INTRACAUDAL; PERINEURAL at 09:37

## 2023-03-09 RX ADMIN — SODIUM CHLORIDE, POTASSIUM CHLORIDE, SODIUM LACTATE AND CALCIUM CHLORIDE: 600; 310; 30; 20 INJECTION, SOLUTION INTRAVENOUS at 09:37

## 2023-03-09 ASSESSMENT — PAIN SCALES - GENERAL
PAINLEVEL_OUTOF10: 0
PAINLEVEL_OUTOF10: 0

## 2023-03-09 NOTE — H&P
ENDOSCOPY   Pre-operative History and Physical    Patient: Melissa Corrigan  : 1975      History Obtained From:  patient, electronic medical record        HISTORY OF PRESENT ILLNESS:                The patient is a 52 y.o. female with significant past medical history as below who presents for colonoscopy    Indication Screening    Past Medical History:        Diagnosis Date    Abnormal uterine bleeding     Cough     Occ. Productive Cough Clear Sputum    Diabetes mellitus ([facility] [address] Dx 2006 Or 2007    Dysmenorrhea     Fibroids     Hyperlipidemia     Hypertension     \"on medication for high blood pressure of 6- 7 yrs\"    Pelvic pain     Sinus problem     Stage 3a chronic kidney disease (CKD) ([facility] [address]     Teeth missing     Upper And Lower    Viral gastroenteritis     [place] teeth extracted     One [place] Tooth Extracted In Past       Past Surgical History:        Procedure Laterality Date    CHOLECYSTECTOMY, LAPAROSCOPIC  7-7-15    COLONOSCOPY      ENDOMETRIAL ABLATION      Tubal Ligation Also Done    HYSTERECTOMY (CERVIX STATUS UNKNOWN)  16    supracervical ABD hysterectomy/ repair of serosal tear of bowel    TUBAL LIGATION  2012    Done With Endometrial Ablation    WISDOM TOOTH EXTRACTION      One [place] Tooth Extracted In Past         Current Medications:    Medications    Prior to Admission medications    Medication Sig Start Date End Date Taking?  Authorizing Provider   norethindrone (MAHENDRA) 0.35 MG tablet Take 1 tablet by mouth daily 23   TRAYN Tamayo CNP   dapagliflozin (FARXIGA) 5 MG tablet Take 1 tablet by mouth every morning 23   TARYN Caldwell CNP   amLODIPine (NORVASC) 5 MG tablet Take 1 tablet by mouth once daily 23   TARYN Caldwell CNP   atorvastatin (LIPITOR) 40 MG tablet Take 1 tablet by mouth once daily 23   TARYN Caldwell CNP   FLUoxetine (PROZAC) 20 MG capsule Take 1 capsule by mouth daily 23   TARYN Caldwell CNP lisinopril (PRINIVIL;ZESTRIL) 20 MG tablet Take 1 tablet by mouth once daily 1/11/23   Karen Finely, APRN - CNP   metFORMIN (GLUCOPHAGE) 500 MG tablet TAKE 2 TABLETS BY MOUTH TWICE DAILY WITH MEALS 1/11/23   Karen Finely, APRN - CNP   glimepiride (AMARYL) 4 MG tablet Take 0.5 tablets by mouth every morning (before breakfast) for 7 days, THEN 1 tablet every morning (before breakfast). 1/11/23 4/18/23  Karen Finely, APRN - CNP   blood glucose monitor strips Test one time a day & as needed for symptoms of irregular blood glucose. Dispense sufficient amount for indicated testing frequency plus additional to accommodate PRN testing needs. Patient not taking: No sig reported 7/12/21   Karen Finely, APRN - CNP   Lancets MISC 1 each by Does not apply route daily  Patient not taking: No sig reported 7/12/21   Karen Finely, APRN - CNP      Scheduled Medications:   Infusions:   PRN Medications: Allergies: Allergies   Allergen Reactions    Pravachol [Pravastatin] Nausea Only         Allergies:  Pravachol [pravastatin]    Social History:   TOBACCO:   reports that she has been smoking cigarettes. She started smoking about 22 years ago. She has never used smokeless tobacco.  ETOH:   reports current alcohol use.     Family History:       Problem Relation Age of Onset    Arthritis Mother     Diabetes Maternal Uncle     Heart Attack Maternal Grandfather     Diabetes Maternal Grandfather     Breast Cancer Other [de-identified]    Ovarian Cancer Neg Hx        REVIEW OF SYSTEMS:    Negative except for HPI        PHYSICAL EXAM:      Vitals:    Ht 5' 4\" (1.626 m)   Wt 155 lb (70.3 kg)   LMP  (LMP Unknown)   BMI 26.61 kg/m²     General Appearance:    Alert, cooperative, no distress, appears stated age   [de-identified]:    NO anemia, No jaundice , no Cyanosis, Supple neck   Neck:   Supple, symmetrical, trachea midline, no adenopathy;     thyroid:    Lungs:     Clear to auscultation bilaterally, respirations unlabored   Chest Wall:    No tenderness or deformity    Heart:    Regular rate and rhythm, S1 and S2 normal, no murmur, rub   or gallop   Abdomen:     Soft, non-tender, bowel sounds active all four quadrants,     no masses, no organomegaly, no ascitis   Rectal:    Planned at time of C scope   Extremities:   Extremities normal, atraumatic, no cyanosis or edema   Pulses:   2+ and symmetric all extremities   Skin:   Skin color, texture, turgor normal, no rashes or lesions   Lymph nodes:   No abnormality   Neurologic:   No focal deficits, moving all four extremities      ASA Grade:  ASA 2 - Patient with mild systemic disease with no functional limitations  Air Way: As per Pre-procedure Anesthesia note. ASSESSMENT AND PLAN:    1. Patient is a 52 y.o. female here for colonoscopy with Anesthesia. 2.  Procedure options, risks and benefits reviewed with patient. Patient expresses understanding.     Carolina Kumar MD  3/9/2023  7:41 AM

## 2023-03-09 NOTE — ANESTHESIA POSTPROCEDURE EVALUATION
Department of Anesthesiology  Postprocedure Note    Patient: Jenifer Mclain  MRN: [id number]  YOB: 1975  Date of evaluation: 3/9/2023      Procedure Summary     Date: 03/09/23 Room / Location: [address] [place] [address]    Anesthesia Start: 1640 Anesthesia Stop: 1006    Procedure: COLONOSCOPY DIAGNOSTIC Diagnosis:       Colon cancer screening      (Colon cancer screening [Z12.11])    Surgeons: Marielle Naidu MD Responsible Provider: Vita Naqvi DO    Anesthesia Type: MAC ASA Status: 2          Anesthesia Type: No value filed.     Delilah Phase I: Delilah Score: 10    Delilah Phase II:        Anesthesia Post Evaluation    Patient location during evaluation: bedside  Patient participation: complete - patient participated  Level of consciousness: awake and alert  Pain score: 1  Airway patency: patent  Nausea & Vomiting: no nausea and no vomiting  Complications: no  Cardiovascular status: hemodynamically stable  Respiratory status: acceptable, room air, spontaneous ventilation and nonlabored ventilation  Hydration status: euvolemic

## 2023-03-09 NOTE — PROGRESS NOTES
1012- Pt returned to SDS rm 17, respirations even and unlabored, VSS, pt alert and oriented.  Family at bedside, report at bedside w/ [address] provided to pt, tolerating well   1024- Discharge instructions reviewed w/ pt and pt family member at bedside, verbalized understanding, no questions at this time   21 - Pt dressing w/ assistance from pt family member   26- Pt assisted to restroom  1044-Pt discharged via car w/ pt family member driving

## 2023-03-09 NOTE — DISCHARGE INSTRUCTIONS
COLONOSCOPY        OFFICE NUMBER [phone]    FOLLOW UP APPOINTMENT OR AS NEEDED. REPEAT PROCEDURE IN 10 YEARS OR AS NEEDED. TEST ORDERED: NONE. What to expect at home: Your Recovery   Your doctor will tell you when you can eat and do your other usual activities Your doctor will talk to you about when you will need your next colonoscopy. Your doctor can help you decide how often you need to be checked. This will depend on the results of your test and your risk for colorectal cancer. After the test, you may be bloated or have gas pains. You may need to pass gas. If a biopsy was done or a polyp was removed, you may have streaks of blood in your stool (feces) for a few days. This care sheet gives you a general idea about how long it will take for you to recover. But each person recovers at a different pace. Follow the steps below to get better as quickly as possible. How can you care for yourself at home? Activity  Rest when you feel tired. Diet  Follow your doctor's directions for eating. Unless your doctor has told you not to, drink plenty of fluids. This helps to replace the fluids that were lost during the colon prep. DO NOT DRINK ALCOHOL. Medicines  Your doctor will tell you if and when you can restart your medicines. He or she will also give you instructions about taking any new medicines. If you take blood thinners, such as warfarin (Coumadin), clopidogrel (Plavix), or aspirin, be sure to talk to your doctor. He or she will tell you if and when to start taking those medicines again. Make sure that you understand exactly what your doctor wants you to do. If polyps were removed or a biopsy was done during the test, your doctor may tell you not to take aspirin or other anti-inflammatory medicines for a few days. These include ibuprofen (Advil, Motrin) and naproxen (Aleve). Other instructions: Anethesia  For your safety, do not drive or operate machinery for 24 hours.   Do not sign legal documents or make major decisions for 24 hours. The anesthesia can make it hard for you to fully understand what you are agreeing to. Follow-up care is a key part of your treatment and safety. Be sure to make and go to all appointments, and call your doctor if you are having problems. It's also a good idea to know your test results and keep a list of the medicines you take. When should you call for help? [address] [facility] [address] [place] [phone]  Call 911 anytime you think you may need emergency care. For example, call if:  You passed out (lost consciousness). You pass maroon or bloody stools. You have severe belly pain. Call your doctor now or seek immediate medical care if:  Your stools are black and tarlike. Your stools have streaks of blood, but you did not have a biopsy or any polyps removed. You have belly pain, or your belly is swollen and firm. You vomit. You have a fever. You are very dizzy. Watch closely for changes in your health, and be sure to contact your doctor if you have any problems. Where can you learn more? Go to [email or web address] and sign in to your [email or web address] account. Enter E264 in the [facility] box to learn more about Colonoscopy: What to Expect at Home.     If you do not have an account, please click on the Sign Up Now link. © 7858-4522 Healthwise, Incorporated. Care instructions adapted under license by [facility] ([facility]). This care instruction is for use with your licensed healthcare professional. If you have questions about a medical condition or this instruction, always ask your healthcare professional. [place] [address] any warranty or liability for your use of this information.   Content Version: 84.9.727651; Current as of: November 20, 2015             [facility]  [phone]    Do not drive, work around [facility] [address] or use equipment. Do not drink any alcoholic beverages. Do not smoke while alone. Avoid making important decisions. Plan to spend a quiet, relaxed evening @ home. Resume normal activities as you begin to feel better. Eat lightly for your first meal, then gradually increase your diet to what is normal for you. In case of nausea, avoid food and drink only clear liquids. Resume food as nausea ceases. Notify your surgeon if you experience fever, chills, large amount of bleeding, difficulty breathing, persistent nausea and vomiting or any other disturbing problem. Call for a follow-up appointment with your surgeon. Advance Care Planning  People with COVID-19 may have no symptoms, mild symptoms, such as fever, cough, and shortness of breath or they may have more severe illness, developing severe and fatal pneumonia. As a result, Advance Care Planning with attention to naming a health care decision maker (someone you trust to make healthcare decisions for you if you could not speak for yourself) and sharing other health care preferences is important BEFORE a possible health crisis. Please contact your Primary Care Provider to discuss Advance Care Planning. Preventing the Spread of Coronavirus Disease 2019 in Homes and Residential Communities  For the most recent information go to [email or web address] [email or web address]    Prevention steps for People with confirmed or suspected COVID-19 (including persons under investigation) who do not need to be hospitalized  and   People with confirmed COVID-19 who were hospitalized and determined to be medically stable to go home    Your healthcare provider and public health staff will evaluate whether you can be cared for at home. If it is determined that you do not need to be hospitalized and can be isolated at home, you will be monitored by staff from your local or state health department.  You should follow the prevention steps below until a healthcare provider or local or state health department says you can return to your normal activities. Stay home except to get medical care  People who are mildly ill with COVID-19 are able to isolate at home during their illness. You should restrict activities outside your home, except for getting medical care. Do not go to work, school, or public areas. Avoid using public transportation, ride-sharing, or taxis. Separate yourself from other people and animals in your home  People: As much as possible, you should stay in a specific room and away from other people in your home. Also, you should use a separate bathroom, if available. Animals: You should restrict contact with pets and other animals while you are sick with COVID-19, just like you would around other people. Although there have not been reports of pets or other animals becoming sick with COVID-19, it is still recommended that people sick with COVID-19 limit contact with animals until more information is known about the virus. When possible, have another member of your household care for your animals while you are sick. If you are sick with COVID-19, avoid contact with your pet, including petting, snuggling, being kissed or licked, and sharing food. If you must care for your pet or be around animals while you are sick, wash your hands before and after you interact with pets and wear a facemask. Call ahead before visiting your doctor  If you have a medical appointment, call the healthcare provider and tell them that you have or may have COVID-19. This will help the healthcare providers office take steps to keep other people from getting infected or exposed. Wear a facemask  You should wear a facemask when you are around other people (e.g., sharing a room or vehicle) or pets and before you enter a healthcare providers office.  If you are not able to wear a facemask (for example, because it causes trouble breathing), then people who live with you should not stay in the same room with you, or they should wear a facemask if they enter your room.  Cover your coughs and sneezes  Cover your mouth and nose with a tissue when you cough or sneeze. Throw used tissues in a lined trash can. Immediately wash your hands with soap and water for at least 20 seconds or, if soap and water are not available, clean your hands with an alcohol-based hand  that contains at least 60% alcohol.  Clean your hands often  Wash your hands often with soap and water for at least 20 seconds, especially after blowing your nose, coughing, or sneezing; going to the bathroom; and before eating or preparing food. If soap and water are not readily available, use an alcohol-based hand  with at least 60% alcohol, covering all surfaces of your hands and rubbing them together until they feel dry.  Soap and water are the best option if hands are visibly dirty. Avoid touching your eyes, nose, and mouth with unwashed hands.  Avoid sharing personal household items  You should not share dishes, drinking glasses, cups, eating utensils, towels, or bedding with other people or pets in your home. After using these items, they should be washed thoroughly with soap and water.  Clean all “high-touch” surfaces everyday  High touch surfaces include counters, tabletops, doorknobs, bathroom fixtures, toilets, phones, keyboards, tablets, and bedside tables. Also, clean any surfaces that may have blood, stool, or body fluids on them. Use a household cleaning spray or wipe, according to the label instructions. Labels contain instructions for safe and effective use of the cleaning product including precautions you should take when applying the product, such as wearing gloves and making sure you have good ventilation during use of the product.  Monitor your symptoms  Seek prompt medical attention if your illness is worsening (e.g., difficulty breathing). Before seeking care, call your healthcare  provider and tell them that you have, or are being evaluated for, COVID-19. Put on a facemask before you enter the facility. These steps will help the healthcare providers office to keep other people in the office or waiting room from getting infected or exposed. Ask your healthcare provider to call the local or state health department. Persons who are placed under active monitoring or facilitated self-monitoring should follow instructions provided by their local health department or occupational health professionals, as appropriate. When working with your local health department check their available hours. If you have a medical emergency and need to call 911, notify the dispatch personnel that you have, or are being evaluated for COVID-19. If possible, put on a facemask before emergency medical services arrive. Discontinuing home isolation  Patients with confirmed COVID-19 should remain under home isolation precautions until the risk of secondary transmission to others is thought to be low. The decision to discontinue home isolation precautions should be made on a case-by-case basis, in consultation with healthcare providers and state and local health departments.

## 2023-03-20 ENCOUNTER — OFFICE VISIT (OUTPATIENT)
Dept: FAMILY MEDICINE CLINIC | Age: 48
End: 2023-03-20
Payer: COMMERCIAL

## 2023-03-20 VITALS
HEART RATE: 85 BPM | SYSTOLIC BLOOD PRESSURE: 110 MMHG | BODY MASS INDEX: 27.29 KG/M2 | DIASTOLIC BLOOD PRESSURE: 62 MMHG | RESPIRATION RATE: 18 BRPM | TEMPERATURE: 97.5 F | WEIGHT: 159 LBS

## 2023-03-20 DIAGNOSIS — D72.9 NEUTROPHILIA: ICD-10-CM

## 2023-03-20 DIAGNOSIS — F41.9 ANXIETY: ICD-10-CM

## 2023-03-20 DIAGNOSIS — E11.9 TYPE 2 DIABETES MELLITUS WITHOUT COMPLICATION, WITHOUT LONG-TERM CURRENT USE OF INSULIN (HCC): Primary | ICD-10-CM

## 2023-03-20 DIAGNOSIS — J06.9 VIRAL URI: ICD-10-CM

## 2023-03-20 DIAGNOSIS — Z23 NEED FOR PROPHYLACTIC VACCINATION AND INOCULATION AGAINST VARICELLA: ICD-10-CM

## 2023-03-20 DIAGNOSIS — R05.1 ACUTE COUGH: ICD-10-CM

## 2023-03-20 DIAGNOSIS — I10 ESSENTIAL HYPERTENSION: ICD-10-CM

## 2023-03-20 LAB — HBA1C MFR BLD: 7.2 %

## 2023-03-20 PROCEDURE — 3051F HG A1C>EQUAL 7.0%<8.0%: CPT | Performed by: NURSE PRACTITIONER

## 2023-03-20 PROCEDURE — 3078F DIAST BP <80 MM HG: CPT | Performed by: NURSE PRACTITIONER

## 2023-03-20 PROCEDURE — 83036 HEMOGLOBIN GLYCOSYLATED A1C: CPT | Performed by: NURSE PRACTITIONER

## 2023-03-20 PROCEDURE — 99214 OFFICE O/P EST MOD 30 MIN: CPT | Performed by: NURSE PRACTITIONER

## 2023-03-20 PROCEDURE — 3074F SYST BP LT 130 MM HG: CPT | Performed by: NURSE PRACTITIONER

## 2023-03-20 RX ORDER — GLIMEPIRIDE 2 MG/1
2 TABLET ORAL
Qty: 90 TABLET | Refills: 1
Start: 2023-03-20

## 2023-03-20 RX ORDER — FLUTICASONE PROPIONATE 50 MCG
1 SPRAY, SUSPENSION (ML) NASAL DAILY
Qty: 16 G | Refills: 0 | Status: SHIPPED | OUTPATIENT
Start: 2023-03-20

## 2023-03-20 RX ORDER — PREDNISONE 20 MG/1
20 TABLET ORAL DAILY
Qty: 3 TABLET | Refills: 0 | Status: SHIPPED | OUTPATIENT
Start: 2023-03-20 | End: 2023-03-23

## 2023-03-20 ASSESSMENT — PATIENT HEALTH QUESTIONNAIRE - PHQ9
SUM OF ALL RESPONSES TO PHQ9 QUESTIONS 1 & 2: 0
SUM OF ALL RESPONSES TO PHQ QUESTIONS 1-9: 0
SUM OF ALL RESPONSES TO PHQ QUESTIONS 1-9: 0
1. LITTLE INTEREST OR PLEASURE IN DOING THINGS: 0
SUM OF ALL RESPONSES TO PHQ QUESTIONS 1-9: 0
SUM OF ALL RESPONSES TO PHQ QUESTIONS 1-9: 0
2. FEELING DOWN, DEPRESSED OR HOPELESS: 0

## 2023-03-20 ASSESSMENT — ENCOUNTER SYMPTOMS
NAUSEA: 0
CHEST TIGHTNESS: 0
VOMITING: 0
ABDOMINAL PAIN: 0
DIARRHEA: 0
WHEEZING: 0
COUGH: 0
SHORTNESS OF BREATH: 0
CONSTIPATION: 0

## 2023-03-20 NOTE — ASSESSMENT & PLAN NOTE
A1c with significant improvement. Feels hypoglycemic if does not eat something around 4 pm--taking her medication at lunch when she eats. Will get shaky around dinner time and blood sugar in 70s. Occurs approximately twice weekly. Morning glucose in 110s.

## 2023-03-20 NOTE — PROGRESS NOTES
COLONOSCOPY DIAGNOSTIC performed by Nancy Prieto MD at [facility]  2012    Tubal Ligation Also Done    HYSTERECTOMY (CERVIX STATUS UNKNOWN)  4/19/16    supracervical ABD hysterectomy/ repair of serosal tear of bowel    TUBAL LIGATION  2012    Done With Endometrial Ablation    WISDOM TOOTH EXTRACTION      [place] Tooth Extracted In Past     Social History     Socioeconomic History    Marital status:      Spouse name: None    Number of children: None    Years of education: None    Highest education level: None   Tobacco Use    Smoking status: Light Smoker     Packs/day: 0.00     Years: 16.00     Pack years: 0.00     Types: Cigarettes     Start date: 4/15/2000    Smokeless tobacco: Never    Tobacco comments:     \"I Smoke Black And Mild, They Are Like Little Cigars, Only Smoke Occ\"   Vaping Use    Vaping Use: Never used   Substance and Sexual Activity    Alcohol use: Yes     Comment: \"Occ. A Couple Times A Week\"    Drug use: No    Sexual activity: Yes     Partners: Male     Social Determinants of Health     Financial Resource Strain: Low Risk     Difficulty of Paying Living Expenses: Not hard at all   Food Insecurity: No Food Insecurity    Worried About [address] in the Last Year: Never true    [email or web address]  [address] in the Last Year: Never true   Transportation Needs: Unknown    Lack of Transportation (Non-Medical): No   Housing Stability: Unknown    Unstable Housing in the Last Year: No     Cough for three weeks. Denies wheezing, SOB. She has some postnasal drainage. Has tried mucinex, nyquil. Mild improvement in symptoms. Type 2 diabetes mellitus without complication (HCC)  R3U with significant improvement. Feels hypoglycemic if does not eat something around 4 pm--taking her medication at lunch when she eats. Will get shaky around dinner time and blood sugar in 70s. Occurs approximately twice weekly. Morning glucose in 110s. Essential hypertension  Stable.  Patient

## 2023-04-05 RX ORDER — LOSARTAN POTASSIUM 50 MG/1
50 TABLET ORAL DAILY
Qty: 90 TABLET | Refills: 1 | Status: SHIPPED | OUTPATIENT
Start: 2023-04-05

## 2023-04-08 PROBLEM — Z12.11 COLON CANCER SCREENING: Status: RESOLVED | Noted: 2023-03-09 | Resolved: 2023-04-08

## 2023-05-17 DIAGNOSIS — E11.9 TYPE 2 DIABETES MELLITUS WITHOUT COMPLICATION, WITHOUT LONG-TERM CURRENT USE OF INSULIN (HCC): ICD-10-CM

## 2023-05-17 DIAGNOSIS — N18.9 CHRONIC KIDNEY DISEASE, UNSPECIFIED CKD STAGE: ICD-10-CM

## 2023-05-17 DIAGNOSIS — E11.9 TYPE 2 DIABETES MELLITUS WITHOUT COMPLICATION, WITHOUT LONG-TERM CURRENT USE OF INSULIN (HCC): Primary | ICD-10-CM

## 2023-05-17 RX ORDER — DAPAGLIFLOZIN 5 MG/1
TABLET, FILM COATED ORAL
Qty: 90 TABLET | Refills: 1 | Status: SHIPPED | OUTPATIENT
Start: 2023-05-17 | End: 2023-07-17 | Stop reason: SDUPTHER

## 2023-05-17 RX ORDER — BLOOD-GLUCOSE SENSOR
1 EACH MISCELLANEOUS
Qty: 2 EACH | Refills: 5 | Status: SHIPPED | OUTPATIENT
Start: 2023-05-17

## 2023-06-19 ENCOUNTER — HOSPITAL ENCOUNTER (OUTPATIENT)
Age: 48
Discharge: HOME | End: 2023-06-19
Payer: COMMERCIAL

## 2023-06-19 LAB
ANION GAP SERPL CALCULATED.3IONS-SCNC: 10 MMOL/L (ref 4–16)
BUN SERPL-MCNC: 15 MG/DL (ref 6–23)
CALCIUM SERPL-MCNC: 9 MG/DL (ref 8.3–10.6)
CHLORIDE BLD-SCNC: 106 MMOL/L (ref 99–110)
CO2: 24 MMOL/L (ref 21–32)
CREAT SERPL-MCNC: 1.2 MG/DL (ref 0.6–1.1)
GFR SERPL CREATININE-BSD FRML MDRD: 56 ML/MIN/1.73M2
GLUCOSE P FAST SERPL-MCNC: 139 MG/DL (ref 70–99)
POTASSIUM SERPL-SCNC: 4.4 MMOL/L (ref 3.5–5.1)
SODIUM BLD-SCNC: 140 MMOL/L (ref 135–145)

## 2023-06-19 PROCEDURE — 36415 COLL VENOUS BLD VENIPUNCTURE: CPT

## 2023-06-19 PROCEDURE — 80048 BASIC METABOLIC PNL TOTAL CA: CPT

## 2023-07-17 ENCOUNTER — OFFICE VISIT (OUTPATIENT)
Dept: FAMILY MEDICINE CLINIC | Age: 48
End: 2023-07-17
Payer: COMMERCIAL

## 2023-07-17 VITALS
BODY MASS INDEX: 28.91 KG/M2 | RESPIRATION RATE: 16 BRPM | DIASTOLIC BLOOD PRESSURE: 72 MMHG | SYSTOLIC BLOOD PRESSURE: 118 MMHG | WEIGHT: 168.4 LBS | HEART RATE: 92 BPM | OXYGEN SATURATION: 99 %

## 2023-07-17 DIAGNOSIS — K91.5 POST-CHOLECYSTECTOMY SYNDROME: ICD-10-CM

## 2023-07-17 DIAGNOSIS — N18.9 CHRONIC KIDNEY DISEASE, UNSPECIFIED CKD STAGE: ICD-10-CM

## 2023-07-17 DIAGNOSIS — E11.9 TYPE 2 DIABETES MELLITUS WITHOUT COMPLICATION, WITHOUT LONG-TERM CURRENT USE OF INSULIN (HCC): Primary | ICD-10-CM

## 2023-07-17 DIAGNOSIS — I10 ESSENTIAL HYPERTENSION: ICD-10-CM

## 2023-07-17 DIAGNOSIS — F41.9 ANXIETY: ICD-10-CM

## 2023-07-17 LAB — HBA1C MFR BLD: 6.1 %

## 2023-07-17 PROCEDURE — 3074F SYST BP LT 130 MM HG: CPT | Performed by: NURSE PRACTITIONER

## 2023-07-17 PROCEDURE — 99214 OFFICE O/P EST MOD 30 MIN: CPT | Performed by: NURSE PRACTITIONER

## 2023-07-17 PROCEDURE — 3044F HG A1C LEVEL LT 7.0%: CPT | Performed by: NURSE PRACTITIONER

## 2023-07-17 PROCEDURE — 3078F DIAST BP <80 MM HG: CPT | Performed by: NURSE PRACTITIONER

## 2023-07-17 PROCEDURE — 83037 HB GLYCOSYLATED A1C HOME DEV: CPT | Performed by: NURSE PRACTITIONER

## 2023-07-17 RX ORDER — ATORVASTATIN CALCIUM 40 MG/1
TABLET, FILM COATED ORAL
Qty: 90 TABLET | Refills: 1 | Status: SHIPPED | OUTPATIENT
Start: 2023-07-17

## 2023-07-17 RX ORDER — LOSARTAN POTASSIUM 50 MG/1
50 TABLET ORAL DAILY
Qty: 90 TABLET | Refills: 1 | Status: SHIPPED | OUTPATIENT
Start: 2023-07-17

## 2023-07-17 RX ORDER — BLOOD-GLUCOSE SENSOR
1 EACH MISCELLANEOUS
Qty: 6 EACH | Refills: 3 | Status: SHIPPED | OUTPATIENT
Start: 2023-07-17

## 2023-07-17 RX ORDER — GLUCAGON INJECTION, SOLUTION 1 MG/.2ML
1 INJECTION, SOLUTION SUBCUTANEOUS PRN
Qty: 1 EACH | Refills: 0 | Status: SHIPPED | OUTPATIENT
Start: 2023-07-17

## 2023-07-17 RX ORDER — AMLODIPINE BESYLATE 5 MG/1
TABLET ORAL
Qty: 90 TABLET | Refills: 1 | Status: SHIPPED | OUTPATIENT
Start: 2023-07-17

## 2023-07-17 RX ORDER — CHOLESTYRAMINE 4 G/9G
1 POWDER, FOR SUSPENSION ORAL
Qty: 90 PACKET | Refills: 3 | Status: SHIPPED | OUTPATIENT
Start: 2023-07-17

## 2023-07-17 RX ORDER — METFORMIN HYDROCHLORIDE 500 MG/1
1000 TABLET, EXTENDED RELEASE ORAL
Qty: 180 TABLET | Refills: 1 | Status: SHIPPED | OUTPATIENT
Start: 2023-07-17

## 2023-07-17 RX ORDER — ACETAMINOPHEN AND CODEINE PHOSPHATE 120; 12 MG/5ML; MG/5ML
1 SOLUTION ORAL DAILY
Qty: 84 TABLET | Refills: 3 | Status: SHIPPED | OUTPATIENT
Start: 2023-07-17

## 2023-07-17 RX ORDER — FLUOXETINE HYDROCHLORIDE 20 MG/1
20 CAPSULE ORAL DAILY
Qty: 90 CAPSULE | Refills: 1 | Status: SHIPPED | OUTPATIENT
Start: 2023-07-17

## 2023-07-17 ASSESSMENT — PATIENT HEALTH QUESTIONNAIRE - PHQ9
2. FEELING DOWN, DEPRESSED OR HOPELESS: 0
SUM OF ALL RESPONSES TO PHQ QUESTIONS 1-9: 0
1. LITTLE INTEREST OR PLEASURE IN DOING THINGS: 0
SUM OF ALL RESPONSES TO PHQ QUESTIONS 1-9: 0
SUM OF ALL RESPONSES TO PHQ9 QUESTIONS 1 & 2: 0

## 2023-07-17 ASSESSMENT — ENCOUNTER SYMPTOMS
SHORTNESS OF BREATH: 0
VOMITING: 0
WHEEZING: 0
CHEST TIGHTNESS: 0
ABDOMINAL PAIN: 0
DIARRHEA: 1
COUGH: 0
CONSTIPATION: 0
NAUSEA: 0

## 2023-07-17 NOTE — ASSESSMENT & PLAN NOTE
She is about to start a new position that could increase her stress. However, overall anxiety well controlled.

## 2023-07-17 NOTE — PATIENT INSTRUCTIONS
We are committed to providing you the best care possible. If you receive a survey after visiting one of our offices, please take time to share your experience concerning your physician office visit. These surveys are confidential and no health information about you is shared. We are eager to improve for you and continue to give you satisfactory care, we are counting on your feedback to help make that happen. Welcome to [address] [place] [address] and Pediatrics:    Did you know we now have a faster way for you to move through your appointment? For your convenience, we now have digital registration available. When you schedule your next appointment, you will receive a link via your email as well as a text message that will allow you to complete any paperwork digitally before your appointment.

## 2023-07-17 NOTE — ASSESSMENT & PLAN NOTE
She has had multiple hypoglycemic episodes recently. Her nephrologist advised her to just take 1 metformin at bedtime due to this. Episodes have improved. Her lowest reading had gone into the 50s. She is wanting FMLA due to this. The mornings after she will feel dizzy. She has had to miss a couple hours to a full day. Upon further discussion it appears she is still taking glimepiride.

## 2023-07-17 NOTE — ASSESSMENT & PLAN NOTE
Diarrhea since she had her gallbladder removed in 2015. Occurs 2-3 times a day. No blood or black tarry stools. Worse after certain foods.

## 2023-07-17 NOTE — PROGRESS NOTES
7-7-15    COLONOSCOPY  2000's    COLONOSCOPY N/A 3/9/2023    COLONOSCOPY DIAGNOSTIC performed by Mario Morrison MD at [address]  2012    Tubal Ligation Also Done    HYSTERECTOMY (CERVIX STATUS UNKNOWN)  4/19/16    supracervical ABD hysterectomy/ repair of serosal tear of bowel    TUBAL LIGATION  2012    Done With Endometrial Ablation    WISDOM TOOTH EXTRACTION      One [place] Tooth Extracted In Past     Social History     Socioeconomic History    Marital status:      Spouse name: None    Number of children: None    Years of education: None    Highest education level: None   Tobacco Use    Smoking status: Light Smoker     Packs/day: 0.00     Years: 16.00     Pack years: 0.00     Types: Cigarettes     Start date: 4/15/2000    Smokeless tobacco: Never    Tobacco comments:     \"I Smoke Black And Mild, They Are Like Little Cigars, Only Smoke Occ\"   Vaping Use    Vaping Use: Never used   Substance and Sexual Activity    Alcohol use: Yes     Comment: \"Occ. A Couple Times A Week\"    Drug use: No    Sexual activity: Yes     Partners: Male     Social Determinants of Health     Financial Resource Strain: Low Risk     Difficulty of Paying Living Expenses: Not hard at all   Food Insecurity: No Food Insecurity    Worried About Running Out of Food in the Last Year: Never true    Ran Out of Food in the Last Year: Never true   Transportation Needs: Unknown    Lack of Transportation (Non-Medical): No   Housing Stability: Unknown    Unstable Housing in the Last Year: No         Type 2 diabetes mellitus without complication ([address])  She has had multiple hypoglycemic episodes recently. Her nephrologist advised her to just take 1 metformin at bedtime due to this. Episodes have improved. Her lowest reading had gone into the 50s. She is wanting FMLA due to this. The mornings after she will feel dizzy. She has had to miss a couple hours to a full day.   Upon further discussion it appears she is still

## 2023-10-23 ENCOUNTER — OFFICE VISIT (OUTPATIENT)
Dept: FAMILY MEDICINE CLINIC | Age: 48
End: 2023-10-23
Payer: COMMERCIAL

## 2023-10-23 VITALS
DIASTOLIC BLOOD PRESSURE: 70 MMHG | OXYGEN SATURATION: 96 % | BODY MASS INDEX: 29.83 KG/M2 | SYSTOLIC BLOOD PRESSURE: 120 MMHG | HEART RATE: 84 BPM | RESPIRATION RATE: 16 BRPM | WEIGHT: 173.8 LBS

## 2023-10-23 DIAGNOSIS — Z23 FLU VACCINE NEED: ICD-10-CM

## 2023-10-23 DIAGNOSIS — E11.9 TYPE 2 DIABETES MELLITUS WITHOUT COMPLICATION, WITHOUT LONG-TERM CURRENT USE OF INSULIN (HCC): Primary | ICD-10-CM

## 2023-10-23 LAB — HBA1C MFR BLD: 6.9 %

## 2023-10-23 PROCEDURE — 3074F SYST BP LT 130 MM HG: CPT | Performed by: PHYSICIAN ASSISTANT

## 2023-10-23 PROCEDURE — 99213 OFFICE O/P EST LOW 20 MIN: CPT | Performed by: PHYSICIAN ASSISTANT

## 2023-10-23 PROCEDURE — 3078F DIAST BP <80 MM HG: CPT | Performed by: PHYSICIAN ASSISTANT

## 2023-10-23 PROCEDURE — 90674 CCIIV4 VAC NO PRSV 0.5 ML IM: CPT | Performed by: PHYSICIAN ASSISTANT

## 2023-10-23 PROCEDURE — 83036 HEMOGLOBIN GLYCOSYLATED A1C: CPT | Performed by: PHYSICIAN ASSISTANT

## 2023-10-23 PROCEDURE — 3044F HG A1C LEVEL LT 7.0%: CPT | Performed by: PHYSICIAN ASSISTANT

## 2023-10-23 PROCEDURE — 90471 IMMUNIZATION ADMIN: CPT | Performed by: PHYSICIAN ASSISTANT

## 2023-10-23 RX ORDER — METFORMIN HYDROCHLORIDE 500 MG/1
500 TABLET, EXTENDED RELEASE ORAL
Qty: 180 TABLET | Refills: 1 | Status: SHIPPED
Start: 2023-10-23

## 2023-10-23 ASSESSMENT — ENCOUNTER SYMPTOMS: RESPIRATORY NEGATIVE: 1

## 2023-10-23 ASSESSMENT — PATIENT HEALTH QUESTIONNAIRE - PHQ9
2. FEELING DOWN, DEPRESSED OR HOPELESS: 0
SUM OF ALL RESPONSES TO PHQ QUESTIONS 1-9: 0
SUM OF ALL RESPONSES TO PHQ9 QUESTIONS 1 & 2: 0
SUM OF ALL RESPONSES TO PHQ QUESTIONS 1-9: 0
1. LITTLE INTEREST OR PLEASURE IN DOING THINGS: 0

## 2023-10-23 NOTE — PATIENT INSTRUCTIONS
Welcome to [address] [place] [address] and Pediatrics:    Did you know we now have a faster way for you to move through your appointment? For your convenience, we now have digital registration available. When you schedule your next appointment, you will receive a link via your email as well as a text message that will allow you to complete any paperwork digitally before your appointment. We are committed to providing you the best care possible. If you receive a survey after visiting one of our offices, please take time to share your experience concerning your physician office visit. These surveys are confidential and no health information about you is shared. We are eager to improve for you and continue to give you satisfactory care, we are counting on your feedback to help make that happen. Welcome to [address] [place] [address] and [facility]:    Did you know we now have a faster way for you to move through your appointment? For your convenience, we now have digital registration available. When you schedule your next appointment, you will receive a link via your email as well as a text message that will allow you to complete any paperwork digitally before your appointment.

## 2023-10-23 NOTE — PROGRESS NOTES
Vaccine Information Sheet, \"Influenza - Inactivated\"  given to [employer], or parent/legal guardian of  Clara AdamsGris and verbalized understanding. Patient responses:    Have you ever had a reaction to a flu vaccine? No  Do you have any current illness? No  Have you ever had Guillian [place] Syndrome? No  Do you have a serious allergy to any of the follow: Neomycin, Polymyxin, Thimerosal, eggs or egg products? No    Flu vaccine given per order. Please see immunization tab. Risks and benefits explained. Current VIS given.
Return in about 7 weeks (around 12/11/2023).

## 2023-11-27 ENCOUNTER — OFFICE VISIT (OUTPATIENT)
Dept: FAMILY MEDICINE CLINIC | Age: 48
End: 2023-11-27
Payer: COMMERCIAL

## 2023-11-27 VITALS
WEIGHT: 175.8 LBS | OXYGEN SATURATION: 98 % | DIASTOLIC BLOOD PRESSURE: 62 MMHG | BODY MASS INDEX: 30.18 KG/M2 | RESPIRATION RATE: 20 BRPM | HEART RATE: 96 BPM | SYSTOLIC BLOOD PRESSURE: 118 MMHG

## 2023-11-27 DIAGNOSIS — E11.9 TYPE 2 DIABETES MELLITUS WITHOUT COMPLICATION, WITHOUT LONG-TERM CURRENT USE OF INSULIN (HCC): Primary | ICD-10-CM

## 2023-11-27 LAB — HBA1C MFR BLD: 7.6 %

## 2023-11-27 PROCEDURE — 3051F HG A1C>EQUAL 7.0%<8.0%: CPT | Performed by: PHYSICIAN ASSISTANT

## 2023-11-27 PROCEDURE — 83036 HEMOGLOBIN GLYCOSYLATED A1C: CPT | Performed by: PHYSICIAN ASSISTANT

## 2023-11-27 PROCEDURE — 3078F DIAST BP <80 MM HG: CPT | Performed by: PHYSICIAN ASSISTANT

## 2023-11-27 PROCEDURE — 99213 OFFICE O/P EST LOW 20 MIN: CPT | Performed by: PHYSICIAN ASSISTANT

## 2023-11-27 PROCEDURE — 3074F SYST BP LT 130 MM HG: CPT | Performed by: PHYSICIAN ASSISTANT

## 2023-11-27 RX ORDER — SEMAGLUTIDE 0.68 MG/ML
0.25 INJECTION, SOLUTION SUBCUTANEOUS WEEKLY
Qty: 3 ML | Refills: 5 | Status: SHIPPED | OUTPATIENT
Start: 2023-11-27

## 2023-11-27 RX ORDER — GLIMEPIRIDE 2 MG/1
2 TABLET ORAL EVERY MORNING
Qty: 90 TABLET | Refills: 3 | Status: SHIPPED | OUTPATIENT
Start: 2023-11-27

## 2023-11-27 RX ORDER — METFORMIN HYDROCHLORIDE 500 MG/1
1000 TABLET, EXTENDED RELEASE ORAL
Qty: 180 TABLET | Refills: 1 | Status: SHIPPED
Start: 2023-11-27

## 2023-11-27 ASSESSMENT — PATIENT HEALTH QUESTIONNAIRE - PHQ9
SUM OF ALL RESPONSES TO PHQ QUESTIONS 1-9: 0
SUM OF ALL RESPONSES TO PHQ9 QUESTIONS 1 & 2: 0
2. FEELING DOWN, DEPRESSED OR HOPELESS: 0
SUM OF ALL RESPONSES TO PHQ QUESTIONS 1-9: 0
1. LITTLE INTEREST OR PLEASURE IN DOING THINGS: 0

## 2023-11-27 NOTE — PROGRESS NOTES
Cheikh Jarvis Qian  1975  50 y.o.  female    SUBJECTIVE:    Chief Complaint   Patient presents with    Follow-up     6wk follow up for A1C        HPI  Pt here today for diabetic recheck. A1C today 7.6, up from 6.9 . At last visit, pt reported hypoglycemia, down to 40s in am and medication adjusted. Pt states in past few days she has increased metformin from 500 mg in am to 1000 mg and has noticed some improvement in readings. She also reports continuing amaryl at 2 mg despite being advised several months ago to stop medication by another provider. This was  not on med list when pt seen last month or today.          11/27/2023     3:03 PM 10/23/2023     3:03 PM 7/17/2023     3:25 PM 3/20/2023     3:25 PM 1/11/2023     3:29 PM 3/11/2021     3:22 PM 7/9/2020     1:19 PM   PHQ Scores   PHQ2 Score 0 0 0 0 0 0 0   PHQ9 Score 0 0 0 0 0 0 0     Interpretation of Total Score Depression Severity: 1-4 = Minimal depression, 5-9 = Mild depression, 10-14 = Moderate depression, 15-19 = Moderately severe depression, 20-27 = Severe depression     Current Outpatient Medications on File Prior to Visit   Medication Sig Dispense Refill    norethindrone (MAHENDRA) 0.35 MG tablet Take 1 tablet by mouth daily 84 tablet 3    losartan (COZAAR) 50 MG tablet Take 1 tablet by mouth daily 90 tablet 1    FLUoxetine (PROZAC) 20 MG capsule Take 1 capsule by mouth daily 90 capsule 1    dapagliflozin (FARXIGA) 5 MG tablet Take 1 tablet by mouth every morning 90 tablet 1    atorvastatin (LIPITOR) 40 MG tablet Take 1 tablet by mouth once daily 90 tablet 1    amLODIPine (NORVASC) 5 MG tablet Take 1 tablet by mouth once daily 90 tablet 1    Continuous Blood Gluc Sensor (FREESTYLE IDRIS 3 SENSOR) MISC 1 each by Does not apply route every 14 days 6 each 3    Glucagon (GVOKE HYPOPEN 2-PACK) 1 MG/0.2ML SOAJ Inject 1 mg into the skin as needed (low blood sugar) 1 each 0    cholestyramine (QUESTRAN) 4 g packet Take 1 packet by mouth 3 times daily (with

## 2023-11-28 ASSESSMENT — ENCOUNTER SYMPTOMS
GASTROINTESTINAL NEGATIVE: 1
RESPIRATORY NEGATIVE: 1

## 2023-11-28 NOTE — ASSESSMENT & PLAN NOTE
Reviewed with pt all meds she is currently taking and med list updated. Pt to continue all meds as noted in chart at this time and will try adding ozempic if insurance covers. Recheck in 6-8 weeks  The patient is asked to make an attempt to improve diet and exercise patterns to aid in medical management of this problem.

## 2024-01-08 DIAGNOSIS — N18.9 CHRONIC KIDNEY DISEASE, UNSPECIFIED CKD STAGE: ICD-10-CM

## 2024-01-08 DIAGNOSIS — E11.9 TYPE 2 DIABETES MELLITUS WITHOUT COMPLICATION, WITHOUT LONG-TERM CURRENT USE OF INSULIN (HCC): ICD-10-CM

## 2024-01-08 RX ORDER — SEMAGLUTIDE 0.68 MG/ML
0.25 INJECTION, SOLUTION SUBCUTANEOUS WEEKLY
Qty: 3 ML | Refills: 0 | Status: SHIPPED | OUTPATIENT
Start: 2024-01-08

## 2024-01-08 RX ORDER — BLOOD-GLUCOSE SENSOR
1 EACH MISCELLANEOUS
Qty: 6 EACH | Refills: 0 | Status: SHIPPED | OUTPATIENT
Start: 2024-01-08

## 2024-01-16 ENCOUNTER — HOSPITAL ENCOUNTER (OUTPATIENT)
Age: 49
Discharge: HOME | End: 2024-01-16
Payer: COMMERCIAL

## 2024-01-16 DIAGNOSIS — E11.21 DIABETIC NEPHROPATHY ASSOCIATED WITH TYPE 2 DIABETES MELLITUS (HCC): ICD-10-CM

## 2024-01-16 DIAGNOSIS — N18.31 STAGE 3A CHRONIC KIDNEY DISEASE (HCC): ICD-10-CM

## 2024-01-16 LAB
ANION GAP SERPL CALCULATED.3IONS-SCNC: 12 MMOL/L (ref 7–16)
BILIRUBIN URINE: NEGATIVE MG/DL
BLOOD, URINE: NEGATIVE
BUN SERPL-MCNC: 18 MG/DL (ref 6–23)
CALCIUM SERPL-MCNC: 9.8 MG/DL (ref 8.3–10.6)
CHLORIDE BLD-SCNC: 102 MMOL/L (ref 99–110)
CLARITY: CLEAR
CO2: 26 MMOL/L (ref 21–32)
COLOR: YELLOW
COMMENT UA: ABNORMAL
CREAT SERPL-MCNC: 1.4 MG/DL (ref 0.6–1.1)
CREATININE URINE: 61.4 MG/DL (ref 28–217)
GFR SERPL CREATININE-BSD FRML MDRD: 46 ML/MIN/1.73M2
GLUCOSE SERPL-MCNC: 140 MG/DL (ref 70–99)
GLUCOSE, URINE: >1000 MG/DL
KETONES, URINE: NEGATIVE MG/DL
LEUKOCYTE ESTERASE, URINE: NEGATIVE
NITRITE URINE, QUANTITATIVE: NEGATIVE
PH, URINE: 6.5 (ref 5–8)
POTASSIUM SERPL-SCNC: 4.1 MMOL/L (ref 3.5–5.1)
PROT/CREAT RATIO, UR: 0.2
PROTEIN UA: NEGATIVE MG/DL
SODIUM BLD-SCNC: 140 MMOL/L (ref 135–145)
SPECIFIC GRAVITY UA: <1.005 (ref 1–1.03)
URINE TOTAL PROTEIN: 13.6 MG/DL
UROBILINOGEN, URINE: 0.2 MG/DL (ref 0.2–1)

## 2024-01-16 PROCEDURE — 80048 BASIC METABOLIC PNL TOTAL CA: CPT

## 2024-01-16 PROCEDURE — 84156 ASSAY OF PROTEIN URINE: CPT

## 2024-01-16 PROCEDURE — 81003 URINALYSIS AUTO W/O SCOPE: CPT

## 2024-01-16 PROCEDURE — 82570 ASSAY OF URINE CREATININE: CPT

## 2024-01-16 PROCEDURE — 36415 COLL VENOUS BLD VENIPUNCTURE: CPT

## 2024-02-12 ENCOUNTER — HOSPITAL ENCOUNTER (OUTPATIENT)
Age: 49
Discharge: HOME | End: 2024-02-12
Payer: COMMERCIAL

## 2024-02-12 LAB
25(OH)D3 SERPL-MCNC: 30.04 NG/ML
ANION GAP SERPL CALCULATED.3IONS-SCNC: 12 MMOL/L (ref 7–16)
BUN SERPL-MCNC: 16 MG/DL (ref 6–23)
CALCIUM SERPL-MCNC: 9 MG/DL (ref 8.3–10.6)
CHLORIDE BLD-SCNC: 102 MMOL/L (ref 99–110)
CO2: 25 MMOL/L (ref 21–32)
CREAT SERPL-MCNC: 1.2 MG/DL (ref 0.6–1.1)
GFR SERPL CREATININE-BSD FRML MDRD: 56 ML/MIN/1.73M2
GLUCOSE SERPL-MCNC: 159 MG/DL (ref 70–99)
POTASSIUM SERPL-SCNC: 4.6 MMOL/L (ref 3.5–5.1)
SODIUM BLD-SCNC: 139 MMOL/L (ref 135–145)

## 2024-02-12 PROCEDURE — 80048 BASIC METABOLIC PNL TOTAL CA: CPT

## 2024-02-12 PROCEDURE — 36415 COLL VENOUS BLD VENIPUNCTURE: CPT

## 2024-02-12 PROCEDURE — 82306 VITAMIN D 25 HYDROXY: CPT

## 2024-02-12 NOTE — RESULT ENCOUNTER NOTE
TELL PT:  -THE CREATININE IS DOWN TO 1.2 FROM 1.4 AND THE GFR IS BACK UP TO 56%  - I WILL SEE YOU AT THE NEXT VISIT  -CALL IF ANY CONCERNS  CARLO

## 2024-02-13 DIAGNOSIS — F41.9 ANXIETY: ICD-10-CM

## 2024-02-13 RX ORDER — FLUOXETINE HYDROCHLORIDE 20 MG/1
20 CAPSULE ORAL DAILY
Qty: 90 CAPSULE | Refills: 3 | Status: SHIPPED | OUTPATIENT
Start: 2024-02-13

## 2024-02-20 ASSESSMENT — PATIENT HEALTH QUESTIONNAIRE - PHQ9
SUM OF ALL RESPONSES TO PHQ QUESTIONS 1-9: 0
SUM OF ALL RESPONSES TO PHQ QUESTIONS 1-9: 0
SUM OF ALL RESPONSES TO PHQ9 QUESTIONS 1 & 2: 0
SUM OF ALL RESPONSES TO PHQ9 QUESTIONS 1 & 2: 0
2. FEELING DOWN, DEPRESSED OR HOPELESS: 0
SUM OF ALL RESPONSES TO PHQ QUESTIONS 1-9: 0
SUM OF ALL RESPONSES TO PHQ QUESTIONS 1-9: 0
2. FEELING DOWN, DEPRESSED OR HOPELESS: NOT AT ALL
1. LITTLE INTEREST OR PLEASURE IN DOING THINGS: NOT AT ALL
1. LITTLE INTEREST OR PLEASURE IN DOING THINGS: 0

## 2024-02-22 ENCOUNTER — OFFICE VISIT (OUTPATIENT)
Dept: FAMILY MEDICINE CLINIC | Age: 49
End: 2024-02-22
Payer: COMMERCIAL

## 2024-02-22 VITALS
RESPIRATION RATE: 18 BRPM | DIASTOLIC BLOOD PRESSURE: 76 MMHG | OXYGEN SATURATION: 97 % | HEART RATE: 102 BPM | SYSTOLIC BLOOD PRESSURE: 126 MMHG | WEIGHT: 170.4 LBS | BODY MASS INDEX: 29.23 KG/M2

## 2024-02-22 DIAGNOSIS — I10 ESSENTIAL HYPERTENSION: ICD-10-CM

## 2024-02-22 DIAGNOSIS — N18.9 CHRONIC KIDNEY DISEASE, UNSPECIFIED CKD STAGE: ICD-10-CM

## 2024-02-22 DIAGNOSIS — F41.9 ANXIETY: ICD-10-CM

## 2024-02-22 DIAGNOSIS — E11.9 TYPE 2 DIABETES MELLITUS WITHOUT COMPLICATION, WITHOUT LONG-TERM CURRENT USE OF INSULIN (HCC): Primary | ICD-10-CM

## 2024-02-22 LAB — HBA1C MFR BLD: 7.3 %

## 2024-02-22 PROCEDURE — 83036 HEMOGLOBIN GLYCOSYLATED A1C: CPT | Performed by: PHYSICIAN ASSISTANT

## 2024-02-22 PROCEDURE — 82044 UR ALBUMIN SEMIQUANTITATIVE: CPT | Performed by: PHYSICIAN ASSISTANT

## 2024-02-22 PROCEDURE — 3051F HG A1C>EQUAL 7.0%<8.0%: CPT | Performed by: PHYSICIAN ASSISTANT

## 2024-02-22 PROCEDURE — 3074F SYST BP LT 130 MM HG: CPT | Performed by: PHYSICIAN ASSISTANT

## 2024-02-22 PROCEDURE — 3078F DIAST BP <80 MM HG: CPT | Performed by: PHYSICIAN ASSISTANT

## 2024-02-22 PROCEDURE — 99214 OFFICE O/P EST MOD 30 MIN: CPT | Performed by: PHYSICIAN ASSISTANT

## 2024-02-22 RX ORDER — ACETAMINOPHEN AND CODEINE PHOSPHATE 120; 12 MG/5ML; MG/5ML
1 SOLUTION ORAL DAILY
Qty: 84 TABLET | Refills: 3 | Status: SHIPPED | OUTPATIENT
Start: 2024-02-22

## 2024-02-22 RX ORDER — ATORVASTATIN CALCIUM 40 MG/1
TABLET, FILM COATED ORAL
Qty: 90 TABLET | Refills: 3 | Status: SHIPPED | OUTPATIENT
Start: 2024-02-22

## 2024-02-22 RX ORDER — LOSARTAN POTASSIUM 50 MG/1
50 TABLET ORAL DAILY
Qty: 90 TABLET | Refills: 3 | Status: SHIPPED | OUTPATIENT
Start: 2024-02-22

## 2024-02-22 RX ORDER — FLUOXETINE HYDROCHLORIDE 20 MG/1
20 CAPSULE ORAL DAILY
Qty: 90 CAPSULE | Refills: 3 | Status: SHIPPED | OUTPATIENT
Start: 2024-02-22

## 2024-02-22 RX ORDER — DAPAGLIFLOZIN 5 MG/1
5 TABLET, FILM COATED ORAL EVERY MORNING
Qty: 90 TABLET | Refills: 3 | Status: SHIPPED | OUTPATIENT
Start: 2024-02-22

## 2024-02-22 RX ORDER — SEMAGLUTIDE 0.68 MG/ML
0.5 INJECTION, SOLUTION SUBCUTANEOUS WEEKLY
Qty: 3 ML | Refills: 3 | Status: SHIPPED | OUTPATIENT
Start: 2024-02-22

## 2024-02-22 RX ORDER — BLOOD-GLUCOSE SENSOR
1 EACH MISCELLANEOUS
Qty: 6 EACH | Refills: 0 | Status: SHIPPED | OUTPATIENT
Start: 2024-02-22

## 2024-02-22 RX ORDER — METFORMIN HYDROCHLORIDE 500 MG/1
1000 TABLET, EXTENDED RELEASE ORAL
Qty: 180 TABLET | Refills: 3 | Status: SHIPPED | OUTPATIENT
Start: 2024-02-22

## 2024-02-22 RX ORDER — GLIMEPIRIDE 2 MG/1
2 TABLET ORAL EVERY MORNING
Qty: 90 TABLET | Refills: 3 | Status: SHIPPED | OUTPATIENT
Start: 2024-02-22

## 2024-02-22 RX ORDER — AMLODIPINE BESYLATE 5 MG/1
TABLET ORAL
Qty: 90 TABLET | Refills: 3 | Status: SHIPPED | OUTPATIENT
Start: 2024-02-22

## 2024-02-22 NOTE — PROGRESS NOTES
Clara S Qian  1975  48 y.o.  female    SUBJECTIVE:    Chief Complaint   Patient presents with    Follow-up     Both big toes fell like burning  started a couple weeks ago but to look at them look ok     Flu Vaccine     Already had flu vaccine        HPI  Pt here today for routine recheck    DM-A1C today 7.3, down from 7.6. She is compliant with all meds at this time, does still have occ hypoglycemia at times requiring her to go into work later. CKD stage 3 but following with nephrology. Pt does report new onset of burning sensation in marcell big toes-no color changes noted.     HTN-The patient is taking hypertensive medications compliantly without side effects.  Denies chest pain, dyspnea, edema, or TIA's.    Anxiety-chronic, well controlled with prozac.         2/20/2024     4:45 PM 11/27/2023     3:03 PM 10/23/2023     3:03 PM 7/17/2023     3:25 PM 3/20/2023     3:25 PM 1/11/2023     3:29 PM 3/11/2021     3:22 PM   PHQ Scores   PHQ2 Score 0 0 0 0 0 0 0   PHQ9 Score 0 0 0 0 0 0 0     Interpretation of Total Score Depression Severity: 1-4 = Minimal depression, 5-9 = Mild depression, 10-14 = Moderate depression, 15-19 = Moderately severe depression, 20-27 = Severe depression     Current Outpatient Medications on File Prior to Visit   Medication Sig Dispense Refill    Glucagon (GVOKE HYPOPEN 2-PACK) 1 MG/0.2ML SOAJ Inject 1 mg into the skin as needed (low blood sugar) 1 each 0     No current facility-administered medications on file prior to visit.       Allergies   Allergen Reactions    Pravachol [Pravastatin] Nausea Only       Past Medical History:   Diagnosis Date    Abnormal uterine bleeding     Cough     Occ. Productive Cough Clear Sputum    Diabetes mellitus (HCC) Dx 2006 Or 2007    Dysmenorrhea     Fibroids     Hyperlipidemia     Hypertension     \"on medication for high blood pressure of 6- 7 yrs\"    Pelvic pain     Sinus problem     Stage 3a chronic kidney disease (CKD) (HCC)     Teeth missing

## 2024-02-22 NOTE — PATIENT INSTRUCTIONS
We are committed to providing you the best care possible.    If you receive a survey after visiting one of our offices, please take time to share your experience concerning your physician office visit.  These surveys are confidential and no health information about you is shared.    We are eager to improve for you and continue to give you satisfactory care, we are counting on your feedback to help make that happen.            Welcome to [facility] and Pediatrics:    Did you know we now have a faster way for you to move through your appointment? For your convenience, we now have digital registration available. When you schedule your next appointment, you will receive a link via your email as well as a text message that will allow you to complete any paperwork digitally before your appointment.

## 2024-02-27 PROBLEM — N18.9 CHRONIC KIDNEY DISEASE: Status: ACTIVE | Noted: 2024-02-27

## 2024-02-27 RX ORDER — ONDANSETRON 4 MG/1
4 TABLET, FILM COATED ORAL 3 TIMES DAILY PRN
Qty: 30 TABLET | Refills: 0 | Status: SHIPPED | OUTPATIENT
Start: 2024-02-27

## 2024-02-27 ASSESSMENT — ENCOUNTER SYMPTOMS
RESPIRATORY NEGATIVE: 1
GASTROINTESTINAL NEGATIVE: 1

## 2024-02-27 NOTE — ASSESSMENT & PLAN NOTE
Increase ozempic to 0.5 mg weekly, hopefully will be able to wean off all other meds other than farxiga to reduce risk of hypoglycemia, recheck three months, sooner prn. Pt advised she most likely has early onset neuropathy, will monitor and if worsening may consider gabapentin or MRI. Advised symptoms may improve with better diabetic control

## 2024-03-06 ENCOUNTER — OFFICE VISIT (OUTPATIENT)
Age: 49
End: 2024-03-06
Payer: COMMERCIAL

## 2024-03-06 VITALS — OXYGEN SATURATION: 95 % | BODY MASS INDEX: 29.17 KG/M2 | HEART RATE: 129 BPM | WEIGHT: 170 LBS | TEMPERATURE: 101.1 F

## 2024-03-06 DIAGNOSIS — R52 BODY ACHES: Primary | ICD-10-CM

## 2024-03-06 DIAGNOSIS — R05.1 ACUTE COUGH: ICD-10-CM

## 2024-03-06 DIAGNOSIS — R11.0 NAUSEA: ICD-10-CM

## 2024-03-06 DIAGNOSIS — B34.9 VIRAL ILLNESS: ICD-10-CM

## 2024-03-06 LAB
INFLUENZA A ANTIGEN, POC: NEGATIVE
INFLUENZA B ANTIGEN, POC: NEGATIVE
LOT EXPIRE DATE: NORMAL
LOT KIT NUMBER: NORMAL
SARS-COV-2, POC: NORMAL
VALID INTERNAL CONTROL: PRESENT
VENDOR AND KIT NAME POC: NORMAL

## 2024-03-06 PROCEDURE — 99213 OFFICE O/P EST LOW 20 MIN: CPT | Performed by: NURSE PRACTITIONER

## 2024-03-06 PROCEDURE — 87428 SARSCOV & INF VIR A&B AG IA: CPT | Performed by: NURSE PRACTITIONER

## 2024-03-06 RX ORDER — ONDANSETRON 4 MG/1
4 TABLET, ORALLY DISINTEGRATING ORAL 3 TIMES DAILY PRN
Qty: 21 TABLET | Refills: 0 | Status: SHIPPED | OUTPATIENT
Start: 2024-03-06

## 2024-03-06 RX ORDER — BENZONATATE 100 MG/1
100 CAPSULE ORAL 3 TIMES DAILY PRN
Qty: 30 CAPSULE | Refills: 0 | Status: SHIPPED | OUTPATIENT
Start: 2024-03-06 | End: 2024-03-16

## 2024-03-06 ASSESSMENT — ENCOUNTER SYMPTOMS
SWOLLEN GLANDS: 0
STRIDOR: 0
CHEST TIGHTNESS: 1
ABDOMINAL PAIN: 0
SINUS PAIN: 1
VOMITING: 0
RHINORRHEA: 1
DIARRHEA: 0
SORE THROAT: 0
WHEEZING: 0
SHORTNESS OF BREATH: 0
NAUSEA: 1
COUGH: 1

## 2024-03-06 NOTE — PROGRESS NOTES
Mouth/Throat:      Mouth: Mucous membranes are moist.      Pharynx: No oropharyngeal exudate or posterior oropharyngeal erythema.   Cardiovascular:      Rate and Rhythm: Regular rhythm. Tachycardia present.      Pulses: Normal pulses.      Heart sounds: Normal heart sounds.   Pulmonary:      Effort: Pulmonary effort is normal.      Breath sounds: Normal breath sounds.   Musculoskeletal:      Cervical back: Neck supple.   Lymphadenopathy:      Cervical: No cervical adenopathy.   Skin:     General: Skin is warm and dry.      Capillary Refill: Capillary refill takes less than 2 seconds.   Neurological:      General: No focal deficit present.      Mental Status: She is alert and oriented to person, place, and time.   Psychiatric:         Mood and Affect: Mood normal.         Behavior: Behavior normal.         Thought Content: Thought content normal.         Judgment: Judgment normal.       Results for orders placed or performed in visit on 24   POCT COVID-19 & Influenza A/B   Result Value Ref Range    VALID INTERNAL CONTROL present     Lot/Kit Number [id number]     Lot/Kit  date: 2/15/25     SARS-COV-2, POC Not-Detected Not Detected    Influenza A Antigen, POC Negative     Influenza B Antigen, POC Negative     Vendor and kit name [employer]         ASSESSMENT/PLAN:  1. Body aches    - POCT COVID-19 & Influenza A/B    2. Acute cough  Drink lots of water and other fluids. This helps thin the mucus and soothes a dry or sore throat. Honey or lemon juice in hot water or tea may ease a dry cough.  Take cough medicine as directed by your doctor.  Prop up your head on pillows to help you breathe and ease a dry cough.  Try cough drops or hard candy to soothe a dry or sore throat.  Do not smoke. Avoid secondhand smoke.   - POCT COVID-19 & Influenza A/B  - benzonatate (TESSALON) 100 MG capsule; Take 1 capsule by mouth 3 times daily as needed for Cough  Dispense: 30 capsule; Refill: 0    3. Nausea  To prevent dehydration,

## 2024-04-17 LAB — DIABETIC RETINOPATHY: POSITIVE

## 2024-04-18 DIAGNOSIS — E11.9 TYPE 2 DIABETES MELLITUS WITHOUT COMPLICATION, WITHOUT LONG-TERM CURRENT USE OF INSULIN (HCC): ICD-10-CM

## 2024-04-18 RX ORDER — ATORVASTATIN CALCIUM 40 MG/1
TABLET, FILM COATED ORAL
Qty: 90 TABLET | Refills: 3 | Status: SHIPPED | OUTPATIENT
Start: 2024-04-18

## 2024-05-14 ENCOUNTER — HOSPITAL ENCOUNTER (OUTPATIENT)
Age: 49
Discharge: HOME | End: 2024-05-14
Payer: COMMERCIAL

## 2024-05-14 DIAGNOSIS — N18.31 STAGE 3A CHRONIC KIDNEY DISEASE (HCC): ICD-10-CM

## 2024-05-14 LAB
25(OH)D3 SERPL-MCNC: 59.74 NG/ML
ANION GAP SERPL CALCULATED.3IONS-SCNC: 13 MMOL/L (ref 7–16)
BUN SERPL-MCNC: 13 MG/DL (ref 6–23)
CALCIUM SERPL-MCNC: 9.3 MG/DL (ref 8.3–10.6)
CHLORIDE BLD-SCNC: 104 MMOL/L (ref 99–110)
CO2: 24 MMOL/L (ref 21–32)
CREAT SERPL-MCNC: 1.2 MG/DL (ref 0.6–1.1)
GFR, ESTIMATED: 56 ML/MIN/1.73M2
GLUCOSE SERPL-MCNC: 169 MG/DL (ref 70–99)
POTASSIUM SERPL-SCNC: 4.5 MMOL/L (ref 3.5–5.1)
SODIUM BLD-SCNC: 141 MMOL/L (ref 135–145)

## 2024-05-14 PROCEDURE — 82306 VITAMIN D 25 HYDROXY: CPT

## 2024-05-14 PROCEDURE — 36415 COLL VENOUS BLD VENIPUNCTURE: CPT

## 2024-05-14 PROCEDURE — 80048 BASIC METABOLIC PNL TOTAL CA: CPT

## 2024-05-16 ENCOUNTER — OFFICE VISIT (OUTPATIENT)
Age: 49
End: 2024-05-16
Payer: COMMERCIAL

## 2024-05-16 VITALS
SYSTOLIC BLOOD PRESSURE: 120 MMHG | RESPIRATION RATE: 16 BRPM | BODY MASS INDEX: 28.07 KG/M2 | OXYGEN SATURATION: 97 % | WEIGHT: 163.6 LBS | DIASTOLIC BLOOD PRESSURE: 74 MMHG | HEART RATE: 90 BPM

## 2024-05-16 DIAGNOSIS — E11.9 TYPE 2 DIABETES MELLITUS WITHOUT COMPLICATION, WITHOUT LONG-TERM CURRENT USE OF INSULIN (HCC): Primary | ICD-10-CM

## 2024-05-16 DIAGNOSIS — N18.9 CHRONIC KIDNEY DISEASE, UNSPECIFIED CKD STAGE: ICD-10-CM

## 2024-05-16 LAB — HBA1C MFR BLD: 6.5 %

## 2024-05-16 PROCEDURE — 99213 OFFICE O/P EST LOW 20 MIN: CPT | Performed by: PHYSICIAN ASSISTANT

## 2024-05-16 PROCEDURE — 83036 HEMOGLOBIN GLYCOSYLATED A1C: CPT | Performed by: PHYSICIAN ASSISTANT

## 2024-05-16 PROCEDURE — 3078F DIAST BP <80 MM HG: CPT | Performed by: PHYSICIAN ASSISTANT

## 2024-05-16 PROCEDURE — 3074F SYST BP LT 130 MM HG: CPT | Performed by: PHYSICIAN ASSISTANT

## 2024-05-16 PROCEDURE — 3051F HG A1C>EQUAL 7.0%<8.0%: CPT | Performed by: PHYSICIAN ASSISTANT

## 2024-05-16 RX ORDER — DAPAGLIFLOZIN 5 MG/1
5 TABLET, FILM COATED ORAL EVERY MORNING
Qty: 90 TABLET | Refills: 3 | Status: CANCELLED | OUTPATIENT
Start: 2024-05-16

## 2024-05-16 RX ORDER — ATORVASTATIN CALCIUM 40 MG/1
TABLET, FILM COATED ORAL
Qty: 90 TABLET | Refills: 3 | Status: CANCELLED | OUTPATIENT
Start: 2024-05-16

## 2024-05-16 RX ORDER — FLUOXETINE HYDROCHLORIDE 20 MG/1
20 CAPSULE ORAL DAILY
Qty: 90 CAPSULE | Refills: 3 | Status: CANCELLED | OUTPATIENT
Start: 2024-05-16

## 2024-05-16 RX ORDER — LOSARTAN POTASSIUM 50 MG/1
50 TABLET ORAL DAILY
Qty: 90 TABLET | Refills: 3 | Status: CANCELLED | OUTPATIENT
Start: 2024-05-16

## 2024-05-16 RX ORDER — GLUCAGON INJECTION, SOLUTION 1 MG/.2ML
1 INJECTION, SOLUTION SUBCUTANEOUS PRN
Qty: 1 EACH | Refills: 0 | Status: CANCELLED | OUTPATIENT
Start: 2024-05-16

## 2024-05-16 RX ORDER — METFORMIN HYDROCHLORIDE 500 MG/1
1000 TABLET, EXTENDED RELEASE ORAL
Qty: 180 TABLET | Refills: 3 | Status: CANCELLED | OUTPATIENT
Start: 2024-05-16

## 2024-05-16 RX ORDER — GLIMEPIRIDE 2 MG/1
2 TABLET ORAL EVERY MORNING
Qty: 90 TABLET | Refills: 3 | Status: CANCELLED | OUTPATIENT
Start: 2024-05-16

## 2024-05-16 RX ORDER — ACETAMINOPHEN AND CODEINE PHOSPHATE 120; 12 MG/5ML; MG/5ML
1 SOLUTION ORAL DAILY
Qty: 84 TABLET | Refills: 3 | Status: CANCELLED | OUTPATIENT
Start: 2024-05-16

## 2024-05-16 RX ORDER — BLOOD-GLUCOSE SENSOR
1 EACH MISCELLANEOUS
Qty: 6 EACH | Refills: 3 | Status: SHIPPED | OUTPATIENT
Start: 2024-05-16

## 2024-05-16 RX ORDER — AMLODIPINE BESYLATE 5 MG/1
TABLET ORAL
Qty: 90 TABLET | Refills: 3 | Status: CANCELLED | OUTPATIENT
Start: 2024-05-16

## 2024-05-16 SDOH — ECONOMIC STABILITY: INCOME INSECURITY: HOW HARD IS IT FOR YOU TO PAY FOR THE VERY BASICS LIKE FOOD, HOUSING, MEDICAL CARE, AND HEATING?: NOT HARD AT ALL

## 2024-05-16 SDOH — ECONOMIC STABILITY: FOOD INSECURITY: WITHIN THE PAST 12 MONTHS, THE FOOD YOU BOUGHT JUST DIDN'T LAST AND YOU DIDN'T HAVE MONEY TO GET MORE.: NEVER TRUE

## 2024-05-16 SDOH — ECONOMIC STABILITY: FOOD INSECURITY: WITHIN THE PAST 12 MONTHS, YOU WORRIED THAT YOUR FOOD WOULD RUN OUT BEFORE YOU GOT MONEY TO BUY MORE.: NEVER TRUE

## 2024-05-16 ASSESSMENT — PATIENT HEALTH QUESTIONNAIRE - PHQ9
SUM OF ALL RESPONSES TO PHQ QUESTIONS 1-9: 0
SUM OF ALL RESPONSES TO PHQ QUESTIONS 1-9: 0
SUM OF ALL RESPONSES TO PHQ9 QUESTIONS 1 & 2: 0
SUM OF ALL RESPONSES TO PHQ QUESTIONS 1-9: 0
SUM OF ALL RESPONSES TO PHQ QUESTIONS 1-9: 0
1. LITTLE INTEREST OR PLEASURE IN DOING THINGS: NOT AT ALL
2. FEELING DOWN, DEPRESSED OR HOPELESS: NOT AT ALL

## 2024-05-16 ASSESSMENT — ENCOUNTER SYMPTOMS: RESPIRATORY NEGATIVE: 1

## 2024-05-16 NOTE — PATIENT INSTRUCTIONS
We are committed to providing you the best care possible.    If you receive a survey after visiting one of our offices, please take time to share your experience concerning your physician office visit.  These surveys are confidential and no health information about you is shared.    We are eager to improve for you and continue to give you satisfactory care, we are counting on your feedback to help make that happen.            Welcome to [facility]:    Did you know we now have a faster way for you to move through your appointment? For your convenience, we now have digital registration available. When you schedule your next appointment, you will receive a link via your email as well as a text message that will allow you to complete any paperwork digitally before your appointment.

## 2024-05-16 NOTE — PROGRESS NOTES
[Pravastatin] Nausea Only       Past Medical History:   Diagnosis Date    Abnormal uterine bleeding     Cough     Occ. Productive Cough Clear Sputum    Diabetes mellitus (HCC) Dx 2006 Or 2007    Dysmenorrhea     Fibroids     Hyperlipidemia     Hypertension     \"on medication for high blood pressure of 6- 7 yrs\"    Pelvic pain     Sinus problem     Stage 3a chronic kidney disease (CKD) (HCC)     Teeth missing     Upper And Lower    Viral gastroenteritis     [place] teeth extracted     One [place] Tooth Extracted In Past       Past Surgical History:   Procedure Laterality Date    CHOLECYSTECTOMY, LAPAROSCOPIC  7-7-15    COLONOSCOPY  2000's    COLONOSCOPY N/A 3/9/2023    COLONOSCOPY DIAGNOSTIC performed by Ivy Lamb MD at [facility] ENDOSCOPY    ENDOMETRIAL ABLATION  2012    Tubal Ligation Also Done    HYSTERECTOMY (CERVIX STATUS UNKNOWN)  4/19/16    supracervical ABD hysterectomy/ repair of serosal tear of bowel    TUBAL LIGATION  2012    Done With Endometrial Ablation    WISDOM TOOTH EXTRACTION      One [place] Tooth Extracted In Past       Social History     Socioeconomic History    Marital status:      Spouse name: None    Number of children: None    Years of education: None    Highest education level: None   Tobacco Use    Smoking status: Light Smoker     Types: Cigarettes     Start date: 4/15/2000    Smokeless tobacco: Never    Tobacco comments:     \"I Smoke Black And Mild, They Are Like Little Cigars, Only Smoke Occ\"   Vaping Use    Vaping Use: Never used   Substance and Sexual Activity    Alcohol use: Yes     Comment: \"Occ. A Couple Times A Week\"    Drug use: No    Sexual activity: Yes     Partners: Male     Social Determinants of Health     Financial Resource Strain: Low Risk  (5/16/2024)    Overall Financial Resource Strain (CARDIA)     Difficulty of Paying Living Expenses: Not hard at all   Food Insecurity: No Food Insecurity (5/16/2024)    Hunger Vital Sign     Worried About Running Out of Food in the Last

## 2024-05-16 NOTE — ASSESSMENT & PLAN NOTE
Stop metformin, if any further hypoglycemia then hold amaryl also. Call PCP in 2-3 weeks with any questions/concerns regarding levels. Recheck three months

## 2024-05-29 ENCOUNTER — HOSPITAL ENCOUNTER (OUTPATIENT)
Dept: CT IMAGING | Age: 49
Discharge: HOME | End: 2024-05-29
Attending: INTERNAL MEDICINE
Payer: COMMERCIAL

## 2024-05-29 DIAGNOSIS — N18.31 STAGE 3A CHRONIC KIDNEY DISEASE (HCC): ICD-10-CM

## 2024-05-29 DIAGNOSIS — K42.9 UMBILICAL HERNIA WITHOUT OBSTRUCTION OR GANGRENE: ICD-10-CM

## 2024-05-29 PROCEDURE — 74176 CT ABD & PELVIS W/O CONTRAST: CPT

## 2024-05-31 ENCOUNTER — TELEPHONE (OUTPATIENT)
Dept: BARIATRICS/WEIGHT MGMT | Age: 49
End: 2024-05-31

## 2024-05-31 NOTE — TELEPHONE ENCOUNTER
K42.9 (ICD-10-CM) - Umbilical hernia without obstruction or gangrene  epic ref - to andom    Lmvm*1

## 2024-06-11 RX ORDER — SEMAGLUTIDE 0.68 MG/ML
INJECTION, SOLUTION SUBCUTANEOUS
Qty: 3 ML | Refills: 0 | Status: SHIPPED | OUTPATIENT
Start: 2024-06-11

## 2024-06-13 ENCOUNTER — OFFICE VISIT (OUTPATIENT)
Dept: SURGERY | Age: 49
End: 2024-06-13
Payer: COMMERCIAL

## 2024-06-13 VITALS
WEIGHT: 161.7 LBS | HEIGHT: 64 IN | BODY MASS INDEX: 27.61 KG/M2 | HEART RATE: 92 BPM | OXYGEN SATURATION: 98 % | DIASTOLIC BLOOD PRESSURE: 60 MMHG | SYSTOLIC BLOOD PRESSURE: 110 MMHG

## 2024-06-13 DIAGNOSIS — K42.9 UMBILICAL HERNIA WITHOUT OBSTRUCTION AND WITHOUT GANGRENE: Primary | ICD-10-CM

## 2024-06-13 PROCEDURE — 99203 OFFICE O/P NEW LOW 30 MIN: CPT | Performed by: PHYSICIAN ASSISTANT

## 2024-06-13 PROCEDURE — 3074F SYST BP LT 130 MM HG: CPT | Performed by: PHYSICIAN ASSISTANT

## 2024-06-13 PROCEDURE — 3078F DIAST BP <80 MM HG: CPT | Performed by: PHYSICIAN ASSISTANT

## 2024-06-13 ASSESSMENT — ENCOUNTER SYMPTOMS
COLOR CHANGE: 0
EYE REDNESS: 0
PHOTOPHOBIA: 0
CONSTIPATION: 0
CHOKING: 0
APNEA: 0
ANAL BLEEDING: 0
STRIDOR: 0
ABDOMINAL PAIN: 1
SORE THROAT: 0
BACK PAIN: 0
RECTAL PAIN: 0
EYE ITCHING: 0

## 2024-06-13 NOTE — PROGRESS NOTES
norethindrone (MAHENDRA) 0.35 MG tablet Take 1 tablet by mouth daily 84 tablet 3    losartan (COZAAR) 50 MG tablet Take 1 tablet by mouth daily 90 tablet 3    FLUoxetine (PROZAC) 20 MG capsule Take 1 capsule by mouth daily 90 capsule 3    dapagliflozin (FARXIGA) 5 MG tablet Take 1 tablet by mouth every morning 90 tablet 3    amLODIPine (NORVASC) 5 MG tablet Take 1 tablet by mouth once daily 90 tablet 3    Glucagon (GVOKE HYPOPEN 2-PACK) 1 MG/0.2ML SOAJ Inject 1 mg into the skin as needed (low blood sugar) 1 each 0     No current facility-administered medications for this visit.      Allergies   Allergen Reactions    Pravachol [Pravastatin] Nausea Only       Review of Systems:         Review of Systems   Constitutional:  Negative for chills and fever.   HENT:  Negative for ear pain, mouth sores, sore throat and tinnitus.    Eyes:  Negative for photophobia, redness and itching.   Respiratory:  Negative for apnea, choking and stridor.    Cardiovascular:  Negative for chest pain and palpitations.   Gastrointestinal:  Positive for abdominal pain (intermittent pressure). Negative for anal bleeding, constipation and rectal pain.   Endocrine: Negative for polydipsia.   Genitourinary:  Negative for enuresis, flank pain and hematuria.   Musculoskeletal:  Negative for back pain, joint swelling and myalgias.   Skin:  Negative for color change and pallor.   Allergic/Immunologic: Negative for environmental allergies.   Neurological:  Negative for syncope and speech difficulty.   Psychiatric/Behavioral:  Negative for confusion and hallucinations.          OBJECTIVE:  Physical Exam:    /60   Pulse 92   Ht 1.626 m (5' 4\")   Wt 73.3 kg (161 lb 11.2 oz)   LMP  (LMP Unknown)   SpO2 98%   BMI 27.76 kg/m²      Physical Exam  Constitutional:       Appearance: She is well-developed.   HENT:      Head: Normocephalic.   Eyes:      Pupils: Pupils are equal, round, and reactive to light.   Cardiovascular:      Rate and Rhythm: Normal

## 2024-06-18 ENCOUNTER — PREP FOR PROCEDURE (OUTPATIENT)
Dept: SURGERY | Age: 49
End: 2024-06-18

## 2024-06-18 DIAGNOSIS — K42.9 UMBILICAL HERNIA WITHOUT OBSTRUCTION OR GANGRENE: ICD-10-CM

## 2024-06-19 ENCOUNTER — TELEPHONE (OUTPATIENT)
Dept: SURGERY | Age: 49
End: 2024-06-19

## 2024-06-19 NOTE — TELEPHONE ENCOUNTER
LEFT MESSAGE FOR Clara Laughlin REGARDING SURGERY (OPEN UMB HERNIA REPAIR) SCHEDULED @ [facility]. NOTIFIED OF DATES, TIMES AND LOCATION    PHONE ASSESSMENT   SURGERY - 7/12 @ 10 AM ARRIVAL @ 8 AM  P/O - 7/22 @ 10:15    NPO AFTER MIDNIGHT  (ENTER OTHER PREP INFO)  HOLD BLOOD THINNERS - NA

## 2024-07-29 RX ORDER — SEMAGLUTIDE 0.68 MG/ML
INJECTION, SOLUTION SUBCUTANEOUS
Qty: 3 ML | Refills: 0 | OUTPATIENT
Start: 2024-07-29

## 2024-08-02 NOTE — TELEPHONE ENCOUNTER
Called patient and reviewed providers note with patient.   Patient has appointment in Sept with Kim .   Patient taking Ozempic and Farxiga.  Patient has 1 dose fo ozempic left cause has to hold dose since she is having surgery next week.

## 2024-08-02 NOTE — PROGRESS NOTES
.Surgery @ [facility] on 8/9/24 you will be called 8/8/24 with times    NOTHING TO EAT OR DRINK AFTER MIDNIGHT DAY OF SURGERY    1. Enter thru the hospital main entrance on day of surgery, check in at the Information Desk. If you arrive prior to 6:00am, enter thru the ER entrance.    2. Follow the directions as prescribed by the doctor for your procedure and medications.         Morning of surgery take:  Amlodipine with sips of water         Stop vitamins, supplements and NSAIDS:  8/2/24         Stop Farxiga 8/6/24         Hold Ozempic: 8/3/24    3. Check with your Doctor regarding stopping blood thinners and follow their instructions.    4. Do not smoke, vape or use chewing tobacco morning of surgery. Do not drink any alcoholic beverages 24 hours prior to surgery.       This includes NA Beer. No street drugs 7 days prior to surgery.    5. If you have dentures, contacts of glasses they will be removed before going to the OR; please bring a case.    6. Please bring picture ID, insurance card, paperwork from the doctor’s office (H & P, Consent, & card for implantable devices).    7. Take a shower with an antibacterial soap the night before surgery and the morning of surgery. Do not put anything on your skin      After your morning shower.    8. You will need a responsible adult to drive you home and check on you after surgery.

## 2024-08-05 RX ORDER — SEMAGLUTIDE 0.68 MG/ML
INJECTION, SOLUTION SUBCUTANEOUS
Qty: 3 ML | Refills: 0 | Status: SHIPPED | OUTPATIENT
Start: 2024-08-05

## 2024-08-08 ENCOUNTER — ANESTHESIA EVENT (OUTPATIENT)
Dept: OPERATING ROOM | Age: 49
End: 2024-08-08
Payer: COMMERCIAL

## 2024-08-08 ASSESSMENT — LIFESTYLE VARIABLES: SMOKING_STATUS: 1

## 2024-08-08 NOTE — ANESTHESIA PRE PROCEDURE
tablet 3    losartan (COZAAR) 50 MG tablet Take 1 tablet by mouth daily 90 tablet 3    FLUoxetine (PROZAC) 20 MG capsule Take 1 capsule by mouth daily 90 capsule 3    dapagliflozin (FARXIGA) 5 MG tablet Take 1 tablet by mouth every morning 90 tablet 3    amLODIPine (NORVASC) 5 MG tablet Take 1 tablet by mouth once daily 90 tablet 3    Glucagon (GVOKE HYPOPEN 2-PACK) 1 MG/0.2ML SOAJ Inject 1 mg into the skin as needed (low blood sugar) 1 each 0       Allergies:    Allergies   Allergen Reactions    Pravachol [Pravastatin] Nausea Only       Problem List:    Patient Active Problem List   Diagnosis Code    Uterine fibroid D25.9    Abnormal uterine bleeding (AUB) N93.9    Type 2 diabetes mellitus without complication (HCC) E11.9    Essential hypertension I10    Anxiety F41.9    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 Productive Cough Clear Sputum    Diabetes mellitus (HCC) Dx 2006 Or 2007    Dysmenorrhea     Fibroids     Hyperlipidemia     Hypertension     \"on medication for high blood pressure of 6- 7 yrs\"    Pelvic pain     Sinus problem     Stage 3a chronic kidney disease (CKD) (HCC)     Teeth missing     Upper And Lower    Viral gastroenteritis     [place] teeth extracted     One [place] Tooth Extracted In Past       Past Surgical History:        Procedure Laterality Date    CHOLECYSTECTOMY, LAPAROSCOPIC  7-7-15    COLONOSCOPY  2000's    COLONOSCOPY N/A 3/9/2023    COLONOSCOPY DIAGNOSTIC performed by [facility] Lamb MD at [facility] ENDOSCOPY    ENDOMETRIAL ABLATION  2012    Tubal Ligation Also Done    HYSTERECTOMY (CERVIX STATUS UNKNOWN)  4/19/16    supracervical ABD hysterectomy/ repair of serosal tear of bowel    TUBAL LIGATION  2012    Done With Endometrial Ablation    WISDOM TOOTH EXTRACTION      One [place] Tooth

## 2024-08-08 NOTE — PROGRESS NOTES
8/824 - .Notified patient surgery @ [facility] on  8/9/24 @ 0730, arrival 0600. NPO status and morning medications reviewed. Understanding verbalized.

## 2024-08-09 ENCOUNTER — HOSPITAL ENCOUNTER (OUTPATIENT)
Age: 49
Setting detail: OUTPATIENT SURGERY
Discharge: HOME | End: 2024-08-09
Attending: SURGERY | Admitting: SURGERY
Payer: COMMERCIAL

## 2024-08-09 ENCOUNTER — ANESTHESIA (OUTPATIENT)
Dept: OPERATING ROOM | Age: 49
End: 2024-08-09
Payer: COMMERCIAL

## 2024-08-09 VITALS
BODY MASS INDEX: 26.29 KG/M2 | HEIGHT: 64 IN | HEART RATE: 92 BPM | SYSTOLIC BLOOD PRESSURE: 117 MMHG | RESPIRATION RATE: 16 BRPM | WEIGHT: 154 LBS | DIASTOLIC BLOOD PRESSURE: 66 MMHG | OXYGEN SATURATION: 94 % | TEMPERATURE: 97.3 F

## 2024-08-09 DIAGNOSIS — K42.9 UMBILICAL HERNIA WITHOUT OBSTRUCTION OR GANGRENE: Primary | ICD-10-CM

## 2024-08-09 LAB
ANION GAP SERPL CALCULATED.3IONS-SCNC: 13 MMOL/L (ref 7–16)
BUN SERPL-MCNC: 14 MG/DL (ref 6–23)
CALCIUM SERPL-MCNC: 9.4 MG/DL (ref 8.3–10.6)
CHLORIDE BLD-SCNC: 102 MMOL/L (ref 99–110)
CO2: 22 MMOL/L (ref 21–32)
CREAT SERPL-MCNC: 1.3 MG/DL (ref 0.6–1.1)
GFR, ESTIMATED: 50 ML/MIN/1.73M2
GLUCOSE BLD-MCNC: 183 MG/DL (ref 70–99)
GLUCOSE SERPL-MCNC: 178 MG/DL (ref 70–99)
POTASSIUM SERPL-SCNC: 4.5 MMOL/L (ref 3.5–5.1)
PREGNANCY TEST URINE, POC: NEGATIVE
SODIUM BLD-SCNC: 137 MMOL/L (ref 135–145)

## 2024-08-09 PROCEDURE — C1781 MESH (IMPLANTABLE): HCPCS | Performed by: SURGERY

## 2024-08-09 PROCEDURE — 2580000003 HC RX 258: Performed by: PHYSICIAN ASSISTANT

## 2024-08-09 PROCEDURE — 2500000003 HC RX 250 WO HCPCS: Performed by: NURSE ANESTHETIST, CERTIFIED REGISTERED

## 2024-08-09 PROCEDURE — 3700000000 HC ANESTHESIA ATTENDED CARE: Performed by: SURGERY

## 2024-08-09 PROCEDURE — APPNB60 APP NON BILLABLE TIME 46-60 MINS: Performed by: PHYSICIAN ASSISTANT

## 2024-08-09 PROCEDURE — 3600000006 HC SURGERY LEVEL 6 BASE: Performed by: SURGERY

## 2024-08-09 PROCEDURE — 2580000003 HC RX 258: Performed by: ANESTHESIOLOGY

## 2024-08-09 PROCEDURE — 6360000002 HC RX W HCPCS: Performed by: STUDENT IN AN ORGANIZED HEALTH CARE EDUCATION/TRAINING PROGRAM

## 2024-08-09 PROCEDURE — 49593 RPR AA HRN 1ST 3-10 RDC: CPT | Performed by: PHYSICIAN ASSISTANT

## 2024-08-09 PROCEDURE — 81025 URINE PREGNANCY TEST: CPT

## 2024-08-09 PROCEDURE — 6370000000 HC RX 637 (ALT 250 FOR IP): Performed by: STUDENT IN AN ORGANIZED HEALTH CARE EDUCATION/TRAINING PROGRAM

## 2024-08-09 PROCEDURE — 7100000010 HC PHASE II RECOVERY - FIRST 15 MIN: Performed by: SURGERY

## 2024-08-09 PROCEDURE — 82962 GLUCOSE BLOOD TEST: CPT

## 2024-08-09 PROCEDURE — 2709999900 HC NON-CHARGEABLE SUPPLY: Performed by: SURGERY

## 2024-08-09 PROCEDURE — 6360000002 HC RX W HCPCS: Performed by: PHYSICIAN ASSISTANT

## 2024-08-09 PROCEDURE — 3600000016 HC SURGERY LEVEL 6 ADDTL 15MIN: Performed by: SURGERY

## 2024-08-09 PROCEDURE — 6360000002 HC RX W HCPCS: Performed by: SURGERY

## 2024-08-09 PROCEDURE — 3700000001 HC ADD 15 MINUTES (ANESTHESIA): Performed by: SURGERY

## 2024-08-09 PROCEDURE — 80048 BASIC METABOLIC PNL TOTAL CA: CPT

## 2024-08-09 PROCEDURE — 7100000001 HC PACU RECOVERY - ADDTL 15 MIN: Performed by: SURGERY

## 2024-08-09 PROCEDURE — 49593 RPR AA HRN 1ST 3-10 RDC: CPT | Performed by: SURGERY

## 2024-08-09 PROCEDURE — 7100000011 HC PHASE II RECOVERY - ADDTL 15 MIN: Performed by: SURGERY

## 2024-08-09 PROCEDURE — 7100000000 HC PACU RECOVERY - FIRST 15 MIN: Performed by: SURGERY

## 2024-08-09 PROCEDURE — 6360000002 HC RX W HCPCS: Performed by: NURSE ANESTHETIST, CERTIFIED REGISTERED

## 2024-08-09 DEVICE — IMPLANTABLE DEVICE: Type: IMPLANTABLE DEVICE | Site: ABDOMEN | Status: FUNCTIONAL

## 2024-08-09 RX ORDER — CYCLOBENZAPRINE HCL 5 MG
5 TABLET ORAL 3 TIMES DAILY PRN
Qty: 30 TABLET | Refills: 0 | Status: SHIPPED | OUTPATIENT
Start: 2024-08-09 | End: 2024-08-19

## 2024-08-09 RX ORDER — HYDROCODONE BITARTRATE AND ACETAMINOPHEN 5; 325 MG/1; MG/1
1 TABLET ORAL EVERY 6 HOURS PRN
Qty: 12 TABLET | Refills: 0 | Status: SHIPPED | OUTPATIENT
Start: 2024-08-09 | End: 2024-08-12

## 2024-08-09 RX ORDER — OXYCODONE HYDROCHLORIDE 5 MG/1
5 TABLET ORAL
Status: CANCELLED | OUTPATIENT
Start: 2024-08-09 | End: 2024-08-10

## 2024-08-09 RX ORDER — SODIUM CHLORIDE 0.9 % (FLUSH) 0.9 %
5-40 SYRINGE (ML) INJECTION EVERY 12 HOURS SCHEDULED
Status: CANCELLED | OUTPATIENT
Start: 2024-08-09

## 2024-08-09 RX ORDER — ONDANSETRON 2 MG/ML
INJECTION INTRAMUSCULAR; INTRAVENOUS PRN
Status: DISCONTINUED | OUTPATIENT
Start: 2024-08-09 | End: 2024-08-09 | Stop reason: SDUPTHER

## 2024-08-09 RX ORDER — SODIUM CHLORIDE, SODIUM LACTATE, POTASSIUM CHLORIDE, CALCIUM CHLORIDE 600; 310; 30; 20 MG/100ML; MG/100ML; MG/100ML; MG/100ML
INJECTION, SOLUTION INTRAVENOUS CONTINUOUS
Status: DISCONTINUED | OUTPATIENT
Start: 2024-08-09 | End: 2024-08-09 | Stop reason: HOSPADM

## 2024-08-09 RX ORDER — NALOXONE HYDROCHLORIDE 0.4 MG/ML
INJECTION, SOLUTION INTRAMUSCULAR; INTRAVENOUS; SUBCUTANEOUS PRN
Status: DISCONTINUED | OUTPATIENT
Start: 2024-08-09 | End: 2024-08-09 | Stop reason: HOSPADM

## 2024-08-09 RX ORDER — PROPOFOL 10 MG/ML
INJECTION, EMULSION INTRAVENOUS PRN
Status: DISCONTINUED | OUTPATIENT
Start: 2024-08-09 | End: 2024-08-09 | Stop reason: SDUPTHER

## 2024-08-09 RX ORDER — ONDANSETRON 2 MG/ML
4 INJECTION INTRAMUSCULAR; INTRAVENOUS
Status: CANCELLED | OUTPATIENT
Start: 2024-08-09 | End: 2024-08-10

## 2024-08-09 RX ORDER — SODIUM CHLORIDE 0.9 % (FLUSH) 0.9 %
5-40 SYRINGE (ML) INJECTION EVERY 12 HOURS SCHEDULED
Status: DISCONTINUED | OUTPATIENT
Start: 2024-08-09 | End: 2024-08-09 | Stop reason: HOSPADM

## 2024-08-09 RX ORDER — NALOXONE HYDROCHLORIDE 0.4 MG/ML
INJECTION, SOLUTION INTRAMUSCULAR; INTRAVENOUS; SUBCUTANEOUS PRN
Status: CANCELLED | OUTPATIENT
Start: 2024-08-09

## 2024-08-09 RX ORDER — FENTANYL CITRATE 50 UG/ML
INJECTION, SOLUTION INTRAMUSCULAR; INTRAVENOUS PRN
Status: DISCONTINUED | OUTPATIENT
Start: 2024-08-09 | End: 2024-08-09 | Stop reason: SDUPTHER

## 2024-08-09 RX ORDER — SODIUM CHLORIDE 0.9 % (FLUSH) 0.9 %
5-40 SYRINGE (ML) INJECTION PRN
Status: CANCELLED | OUTPATIENT
Start: 2024-08-09

## 2024-08-09 RX ORDER — SODIUM CHLORIDE 0.9 % (FLUSH) 0.9 %
5-40 SYRINGE (ML) INJECTION PRN
Status: DISCONTINUED | OUTPATIENT
Start: 2024-08-09 | End: 2024-08-09 | Stop reason: HOSPADM

## 2024-08-09 RX ORDER — SODIUM CHLORIDE 9 MG/ML
INJECTION, SOLUTION INTRAVENOUS PRN
Status: CANCELLED | OUTPATIENT
Start: 2024-08-09

## 2024-08-09 RX ORDER — SODIUM CHLORIDE 9 MG/ML
INJECTION, SOLUTION INTRAVENOUS PRN
Status: DISCONTINUED | OUTPATIENT
Start: 2024-08-09 | End: 2024-08-09 | Stop reason: HOSPADM

## 2024-08-09 RX ORDER — LIDOCAINE HYDROCHLORIDE 20 MG/ML
INJECTION, SOLUTION INTRAVENOUS PRN
Status: DISCONTINUED | OUTPATIENT
Start: 2024-08-09 | End: 2024-08-09 | Stop reason: SDUPTHER

## 2024-08-09 RX ORDER — ONDANSETRON 2 MG/ML
4 INJECTION INTRAMUSCULAR; INTRAVENOUS
Status: DISCONTINUED | OUTPATIENT
Start: 2024-08-09 | End: 2024-08-09 | Stop reason: HOSPADM

## 2024-08-09 RX ORDER — ROCURONIUM BROMIDE 10 MG/ML
INJECTION, SOLUTION INTRAVENOUS PRN
Status: DISCONTINUED | OUTPATIENT
Start: 2024-08-09 | End: 2024-08-09 | Stop reason: SDUPTHER

## 2024-08-09 RX ORDER — DROPERIDOL 2.5 MG/ML
0.62 INJECTION, SOLUTION INTRAMUSCULAR; INTRAVENOUS
Status: DISCONTINUED | OUTPATIENT
Start: 2024-08-09 | End: 2024-08-09 | Stop reason: HOSPADM

## 2024-08-09 RX ORDER — BUPIVACAINE HYDROCHLORIDE 5 MG/ML
INJECTION, SOLUTION EPIDURAL; INTRACAUDAL
Status: COMPLETED | OUTPATIENT
Start: 2024-08-09 | End: 2024-08-09

## 2024-08-09 RX ORDER — OXYCODONE HYDROCHLORIDE 5 MG/1
5 TABLET ORAL
Status: COMPLETED | OUTPATIENT
Start: 2024-08-09 | End: 2024-08-09

## 2024-08-09 RX ORDER — DROPERIDOL 2.5 MG/ML
0.62 INJECTION, SOLUTION INTRAMUSCULAR; INTRAVENOUS
Status: CANCELLED | OUTPATIENT
Start: 2024-08-09 | End: 2024-08-10

## 2024-08-09 RX ADMIN — HYDROMORPHONE HYDROCHLORIDE 0.25 MG: 1 INJECTION, SOLUTION INTRAMUSCULAR; INTRAVENOUS; SUBCUTANEOUS at 09:01

## 2024-08-09 RX ADMIN — ROCURONIUM BROMIDE 40 MG: 10 INJECTION, SOLUTION INTRAVENOUS at 07:49

## 2024-08-09 RX ADMIN — ONDANSETRON 4 MG: 2 INJECTION INTRAMUSCULAR; INTRAVENOUS at 07:49

## 2024-08-09 RX ADMIN — SODIUM CHLORIDE, POTASSIUM CHLORIDE, SODIUM LACTATE AND CALCIUM CHLORIDE: 600; 310; 30; 20 INJECTION, SOLUTION INTRAVENOUS at 06:50

## 2024-08-09 RX ADMIN — LIDOCAINE HYDROCHLORIDE 100 MG: 20 INJECTION, SOLUTION INTRAVENOUS at 07:49

## 2024-08-09 RX ADMIN — FENTANYL CITRATE 100 MCG: 50 INJECTION, SOLUTION INTRAMUSCULAR; INTRAVENOUS at 07:49

## 2024-08-09 RX ADMIN — CEFAZOLIN 2000 MG: 2 INJECTION, POWDER, FOR SOLUTION INTRAMUSCULAR; INTRAVENOUS at 07:49

## 2024-08-09 RX ADMIN — PROPOFOL 200 MG: 10 INJECTION, EMULSION INTRAVENOUS at 07:49

## 2024-08-09 RX ADMIN — HYDROMORPHONE HYDROCHLORIDE 0.25 MG: 1 INJECTION, SOLUTION INTRAMUSCULAR; INTRAVENOUS; SUBCUTANEOUS at 08:55

## 2024-08-09 RX ADMIN — OXYCODONE HYDROCHLORIDE 5 MG: 5 TABLET ORAL at 09:59

## 2024-08-09 ASSESSMENT — PAIN SCALES - GENERAL
PAINLEVEL_OUTOF10: 5
PAINLEVEL_OUTOF10: 0

## 2024-08-09 ASSESSMENT — PAIN - FUNCTIONAL ASSESSMENT
PAIN_FUNCTIONAL_ASSESSMENT: PREVENTS OR INTERFERES SOME ACTIVE ACTIVITIES AND ADLS
PAIN_FUNCTIONAL_ASSESSMENT: 0-10
PAIN_FUNCTIONAL_ASSESSMENT: PREVENTS OR INTERFERES SOME ACTIVE ACTIVITIES AND ADLS
PAIN_FUNCTIONAL_ASSESSMENT: 0-10
PAIN_FUNCTIONAL_ASSESSMENT: PREVENTS OR INTERFERES SOME ACTIVE ACTIVITIES AND ADLS
PAIN_FUNCTIONAL_ASSESSMENT: PREVENTS OR INTERFERES SOME ACTIVE ACTIVITIES AND ADLS
PAIN_FUNCTIONAL_ASSESSMENT: 0-10
PAIN_FUNCTIONAL_ASSESSMENT: 0-10

## 2024-08-09 ASSESSMENT — PAIN DESCRIPTION - DESCRIPTORS
DESCRIPTORS: ACHING

## 2024-08-09 ASSESSMENT — PAIN DESCRIPTION - ORIENTATION
ORIENTATION: MID
ORIENTATION: MID

## 2024-08-09 ASSESSMENT — PAIN DESCRIPTION - LOCATION
LOCATION: UMBILICUS
LOCATION: UMBILICUS

## 2024-08-09 NOTE — PROGRESS NOTES
08/09/24 0727   Encounter Summary   Encounter Overview/Reason Pre-Op   Service Provided For Family   Referral/Consult From [facility]   Support System Spouse;Family members   Last Encounter  08/09/24  (Spiritual and emotional support given with prayer. Good support in place.  Family notified how to contact .)   Complexity of Encounter Low   Begin Time 0640   End Time  0645   Total Time Calculated 5 min   Spiritual/Emotional needs   Type Spiritual Support   Assessment/Intervention/Outcome   Assessment Calm   Intervention Active listening;Nurtured Hope;Prayer (assurance of)/[place];Sustaining Presence/Ministry of presence   Outcome Comfort;Coping;Encouraged   Plan and Referrals   Plan/Referrals Continue Support (comment)

## 2024-08-09 NOTE — ANESTHESIA POSTPROCEDURE EVALUATION
Department of Anesthesiology  Postprocedure Note    Patient: Clara Laughlin  MRN: [id number]  YOB: 1975  Date of evaluation: 8/9/2024    Procedure Summary       Date: 08/09/24 Room / Location: [address]    Anesthesia Start: 0740 Anesthesia Stop: 0840    Procedure: OPEN HERNIA UMBILICAL REPAIR WITH MESH (Abdomen) Diagnosis:       Umbilical hernia without obstruction or gangrene      (Umbilical hernia without obstruction or gangrene [K42.9])    Surgeons: Shree Giordano MD Responsible Provider: Rj Jeffries MD    Anesthesia Type: general ASA Status: 2            Anesthesia Type: No value filed.    Delilah Phase I: Delilah Score: 10    Delilah Phase II:      Anesthesia Post Evaluation    Patient location during evaluation: PACU  Patient participation: complete - patient participated  Level of consciousness: awake  Pain score: 0  Airway patency: patent  Nausea & Vomiting: no vomiting and no nausea  Cardiovascular status: blood pressure returned to baseline and hemodynamically stable  Respiratory status: acceptable, spontaneous ventilation and nasal cannula  Hydration status: euvolemic  Pain management: adequate    No notable events documented.

## 2024-08-09 NOTE — BRIEF OP NOTE
Brief Postoperative Note      Patient: Clara Laughlin  YOB: 1975  MRN: [id number]    Date of Procedure: 8/9/2024    Pre-Op Diagnosis Codes:     * Umbilical hernia without obstruction or gangrene [K42.9]    Post-Op Diagnosis: Same       Procedure(s):  OPEN HERNIA UMBILICAL REPAIR    Surgeon(s):  Shree Giordano MD    Assistant:  First Assistant: Anh Aguilar PA-C  Resident: Swati Pascual DO    Anesthesia: General    Estimated Blood Loss (mL): Minimal    Complications: None    Specimens:   * No specimens in log *    Implants:  Implant Name Type Inv. Item Serial No.  Lot No. LRB No. Used Action   MESH MURIEL SM DIA4.3CM VENTRAL POLYPR EPTFE CIR SELF EXP - [id number]  MESH MURIEL SM DIA4.3CM VENTRAL POLYPR EPTFE CIR SELF EXP [id number] BARD DAVOL-WD [id number] N/A 1 Implanted         Drains: * No LDAs found *    Findings:  Infection Present At Time Of Surgery (PATOS) (choose all levels that have infection present):  No infection present  Other Findings: umbilical hernia     Electronically signed by Swati Pascual DO on 8/9/2024 at 8:23 AM

## 2024-08-09 NOTE — H&P
Chief Complaint   Patient presents with    Consultation       NP - Umbilical Hernia w/o obstruction or gangrene  -CT ABD 5/29/2024         SUBJECTIVE:  HPI: Patient presents for evaluation of ventral hernia. Symptoms were first noted a few years ago. Pain is intermittent pressure at the umbilicus. Lump is not reducible. Pt denies N/V, bowel changes. Pt with previous history of abdominal surgery.     I have reviewed the patient's(pertinent information to this visit) medical history, family history(scanned in  theMedia tab under \"patient questioner\"), social history and review of systems with the patient today in the office.        Past Surgical History         Past Surgical History:   Procedure Laterality Date    CHOLECYSTECTOMY, LAPAROSCOPIC   7-7-15    COLONOSCOPY   2000's    COLONOSCOPY N/A 3/9/2023     COLONOSCOPY DIAGNOSTIC performed by Ivy Lamb MD at [facility] ENDOSCOPY    ENDOMETRIAL ABLATION   2012     Tubal Ligation Also Done    HYSTERECTOMY (CERVIX STATUS UNKNOWN)   4/19/16     supracervical ABD hysterectomy/ repair of serosal tear of bowel    TUBAL LIGATION   2012     Done With Endometrial Ablation    WISDOM TOOTH EXTRACTION         One [place] Tooth Extracted In Past         Past Medical History        Past Medical History:   Diagnosis Date    Abnormal uterine bleeding      Cough       Occ. Productive Cough Clear Sputum    Diabetes mellitus (HCC) Dx 2006 Or 2007    Dysmenorrhea      Fibroids      Hyperlipidemia      Hypertension       \"on medication for high blood pressure of 6- 7 yrs\"    Pelvic pain      Sinus problem      Stage 3a chronic kidney disease (CKD) (HCC)      Teeth missing       Upper And Lower    Viral gastroenteritis      [place] teeth extracted       One [place] Tooth Extracted In Past            Family History         Family History   Problem Relation Age of Onset    Arthritis Mother      Diabetes Maternal Uncle      Heart Attack Maternal Grandfather      Diabetes Maternal Grandfather       Breast Cancer Other 80    Ovarian Cancer Neg Hx           Social History               Socioeconomic History    Marital status:        Spouse name: Not on file    Number of children: Not on file    Years of education: Not on file    Highest education level: Not on file   Occupational History    Not on file   Tobacco Use    Smoking status: Light Smoker       Types: Cigarettes       Start date: 4/15/2000    Smokeless tobacco: Never    Tobacco comments:       \"I Smoke Black And Mild, They Are Like Little Cigars, Only Smoke Occ\"   Vaping Use    Vaping Use: Never used   Substance and Sexual Activity    Alcohol use: Yes       Comment: \"Occ. A Couple Times A Week\"    Drug use: No    Sexual activity: Yes       Partners: Male   Other Topics Concern    Not on file   Social History Narrative    Not on file      Social Determinants of Health           Financial Resource Strain: Low Risk  (5/16/2024)     Overall Financial Resource Strain (CARDIA)      Difficulty of Paying Living Expenses: Not hard at all   Food Insecurity: No Food Insecurity (5/16/2024)     Hunger Vital Sign      Worried About Running Out of Food in the Last Year: Never true      Ran Out of Food in the Last Year: Never true   Transportation Needs: Unknown (5/16/2024)     PRAPARE - Transportation      Lack of Transportation (Medical): Not on file      Lack of Transportation (Non-Medical): No   Physical Activity: Not on file   Stress: Not on file   Social Connections: Not on file   Intimate Partner Violence: Not on file   Housing Stability: Unknown (5/16/2024)     Housing Stability Vital Sign      Unable to Pay for Housing in the Last Year: Not on file      Number of Places Lived in the Last Year: Not on file      Unstable Housing in the Last Year: No            Current Facility-Administered Medications          Current Outpatient Medications   Medication Sig Dispense Refill    OZEMPIC, 0.25 OR 0.5 MG/DOSE, 2 MG/3ML SOPN INJECT 0.5 MG SUBCUTANEOUSLY ONCE

## 2024-08-09 NOTE — PROGRESS NOTES
0840 Patient received to PACU and placed on cardiac monitor, alarms on.  Patient arrives drowsy but appropriate.  Bedside report received from DELMER Thorne.  1 umbilical abdominal site with clean, dry, intact dressing and ABD binder present.  Warm blanket and splinting provided for coughing.  0850 Patient offered ice chips or water but refused at this time.  0855 O2 100% on 2L/NC so transitioned to room air and maintaining O2 sat 95% and greater. Medicated with 0.25mg Dilaudid IVP for pain.  0901 Patient denies improvement in pain.  Additional 0.25mg Dilaudid given IVP.   0910 Patient repositioned in bed.  Reports minor relief in pain.  Denies wanting ice chips or water.  Assisting patient with splinting ABD to cough.  0915 Patient resting on cot without distress.  Denies needs.  Remains alert, respirations unlabored.  0922 PACU care complete.  Transferred to [facility] and bedside report given to DELMER Mauricio.  ABD incision and dressing assessed per DELMER Mauricio and myself.

## 2024-08-09 NOTE — PROGRESS NOTES
0925  Pt back to Same Day from PACU per cart. Pt is awake and alert--skin W&D.  VS rechecked and stable. Report received from Deepa DOWELL.  Pt states that pain is still 5/10. Has dry drsg noted in umbilicus with abd binder in place to abdomen.   0930  Pt given juice and crackers and encouraged to eat something to get oral pain med.   0959 Pt eating crackers w/o nausea. Medicated with oral pain med per orders.  1025 Pt states that pain is mostly unchanged. Pt assisted to ambulate to bathroom. Able to urinate w/o difficulty.   1035 IV dc'd and pt up to get dressed for discharge  1050 Pt and  instructed for discharge care and follow-up and use of Rx with understanding voiced.   1108 Pt out to car at exit per wheelchair by volunteer.

## 2024-08-09 NOTE — DISCHARGE INSTRUCTIONS
Discharge Instructions for Abdominal Hernia -- Dr Giordano  [address] [id number]  [phone]    RESUME ACTIVITY:      BATHING: OK to shower but no bath tub or submerging incision under water day after surgery    Wound:  Keep wound dry and clean.  May shower as instructed above.    Dressing:  Change outer dressing daily after shower or if soiled.     DRIVING: No driving until off narcotic pain medications and walking comfortably    RETURN TO WORK: No lifting more than 10 pounds    WALKING:  As tolerated     STAIRS:  As tolerated    Diet    Some pain medicine can cause constipation . To avoid this problem:   Drink plenty of fluids.   Eat foods high in fiber , such as:   Whole grain cereals and breads   Fruits and vegetables   Legumes (eg, beans, lentils)   Physical Activity    Less than 10 pounds for 6 weeks   Ask the doctor when you can return to normal activities.      In addition:   Ask the doctor when you will be able to return to work.   Do not drive unless your doctor has given you permission to do so.     Medications     Take your pain medications as instructed.  Resume your home medications as instructed.      Lifestyle Changes   You and your doctor will plan lifestyle changes that will aid in recovery. If you smoke, your doctor may recommend that you quit . Smoking can cause chronic cough , a risk factor for this condition.         Prevention   To prevent hernias from recurring:   Maintain a healthy weight .   Strengthen abdominal muscles.   Avoid heavy lifting.   Get treated for chronic conditions, like constipation, allergies, or chronic coughing.     Follow-up   The doctor will monitor this condition. You may need more exams. Go to all of your appointments.     Call Dr Giordano at [phone]  If Any of the Following Occurs     After you leave the hospital, call your doctor if any of the following occurs:   Pain that worsens   Other new symptoms   Signs of infection, including fever and

## 2024-08-12 ENCOUNTER — TELEPHONE (OUTPATIENT)
Dept: SURGERY | Age: 49
End: 2024-08-12

## 2024-08-12 NOTE — TELEPHONE ENCOUNTER
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     I called the pt today to see how they were doing post-operatively.  The pt's pain is well controlled with current pain control regimen.   Pt's incisions are doing well. Denies erythema, swelling or drainage.   Pt is tolerating eating without N/V, and has not had a  bowel movements. I reviewed the post-operative instructions, addressed any concerns and answered the patient's questions.     I confirmed the patient's post-op appointment on 8/22/2024 at 1:00PM        Gauri Javier LPN

## 2024-08-13 NOTE — OP NOTE
Procedure Note    Indications: Symptomatic umbilical hernia    Pre-operative Diagnosis: Umbilical hernia 4 cm    Post-operative Diagnosis: Same     Procedure: Open Umbilical Herniorraphy with mesh, 4 cm    Surgeon: JUAN PABLO FISH MD    First Assistant: Anh Aguilar PA-C  The  Use of a first assistant was necessary for the proper positioning, prepping, and draping of the patient, as well as the safe and expeditious execution of the case and closure of skin and subcutaneous tissues.     Anesthesia: General endotracheal anesthesia    ASA Class: 2    Estimated Blood Loss:  Minimal           Total IV Fluids: 500 ml           Specimens: none           Implants: ventralex            Complications:  None; patient tolerated the procedure well.      Procedure Details   The patient was seen in the Holding Room. The risks, benefits, complications, treatment options, and expected outcomes were discussed with the patient. The possibilities of reaction to medication, pulmonary aspiration, perforation of viscus, bleeding, recurrent infection, the need for additional procedures, failure to diagnose a condition, and creating a complication requiring transfusion or operation were discussed with the patient. The patient concurred with the proposed plan, giving informed consent.  The site of surgery properly noted/marked. The patient was taken to Operating Room , identified as Clara Laughlin and the procedure verified as Umbilical Herniorrhaphy. A Time Out was held and the above information confirmed.    The patient was placed supine.  After establishing general anesthesia, the abdomen was prepped and draped in standard fashion.  0.5% marcaine was used for local anesthetic.  A periumbilical incision was created.  Dissection was carried down to the hernia sac located above the fascia and was mobilized from surrounding structures.  Intact fascia was identified circumferentially around the defect.  The defect measured 4 cm. A small

## 2024-08-19 ENCOUNTER — OFFICE VISIT (OUTPATIENT)
Age: 49
End: 2024-08-19
Payer: COMMERCIAL

## 2024-08-19 VITALS
OXYGEN SATURATION: 97 % | HEART RATE: 105 BPM | RESPIRATION RATE: 20 BRPM | SYSTOLIC BLOOD PRESSURE: 100 MMHG | BODY MASS INDEX: 27.09 KG/M2 | DIASTOLIC BLOOD PRESSURE: 70 MMHG | WEIGHT: 157.8 LBS

## 2024-08-19 DIAGNOSIS — E11.9 TYPE 2 DIABETES MELLITUS WITHOUT COMPLICATION, WITHOUT LONG-TERM CURRENT USE OF INSULIN (HCC): Primary | ICD-10-CM

## 2024-08-19 LAB — HBA1C MFR BLD: 6.8 %

## 2024-08-19 PROCEDURE — 3078F DIAST BP <80 MM HG: CPT

## 2024-08-19 PROCEDURE — 36415 COLL VENOUS BLD VENIPUNCTURE: CPT

## 2024-08-19 PROCEDURE — 83036 HEMOGLOBIN GLYCOSYLATED A1C: CPT

## 2024-08-19 PROCEDURE — 3044F HG A1C LEVEL LT 7.0%: CPT

## 2024-08-19 PROCEDURE — 3074F SYST BP LT 130 MM HG: CPT

## 2024-08-19 PROCEDURE — 99213 OFFICE O/P EST LOW 20 MIN: CPT

## 2024-08-19 ASSESSMENT — PATIENT HEALTH QUESTIONNAIRE - PHQ9
SUM OF ALL RESPONSES TO PHQ QUESTIONS 1-9: 0
SUM OF ALL RESPONSES TO PHQ QUESTIONS 1-9: 0
1. LITTLE INTEREST OR PLEASURE IN DOING THINGS: NOT AT ALL
SUM OF ALL RESPONSES TO PHQ QUESTIONS 1-9: 0
SUM OF ALL RESPONSES TO PHQ9 QUESTIONS 1 & 2: 0
2. FEELING DOWN, DEPRESSED OR HOPELESS: NOT AT ALL
SUM OF ALL RESPONSES TO PHQ QUESTIONS 1-9: 0

## 2024-08-19 ASSESSMENT — ENCOUNTER SYMPTOMS
GASTROINTESTINAL NEGATIVE: 1
RESPIRATORY NEGATIVE: 1

## 2024-08-19 NOTE — PROGRESS NOTES
Clara Laughlin (:  1975) is a 49 y.o. female,Established patient, here for evaluation of the following chief complaint(s):  Follow-up (3mth follow up)      Subjective   Pt here for routine follow up. Will need labs today.     T2DM- Currently taking ozempic and farxiga. Denies SE to medication and is tolerating well. Denies polyuria/polydipsia/polyphagia at this time. Pt does have episodes of hypoglycemia despite changing medication regimen. She is scheduled to see endocrinology in September.               2024     9:32 AM 2024     9:59 AM 2024     4:45 PM 2023     3:03 PM 10/23/2023     3:03 PM 2023     3:25 PM 3/20/2023     3:25 PM   PHQ Scores   PHQ2 Score 0 0 0 0 0 0 0   PHQ9 Score 0 0 0 0 0 0 0         I reviewed the medical records and past history for Clara.    Allergies   Allergen Reactions    Pravachol [Pravastatin] Nausea Only       Current Outpatient Medications   Medication Sig Dispense Refill    cyclobenzaprine (FLEXERIL) 5 MG tablet Take 1 tablet by mouth 3 times daily as needed for Muscle spasms 30 tablet 0    OZEMPIC, 0.25 OR 0.5 MG/DOSE, 2 MG/3ML SOPN INJECT 0.5 MG SUBCUTANEOUSLY ONCE A WEEK 3 mL 0    Continuous Glucose Sensor (FREESTYLE IDRIS 3 SENSOR) MISC 1 each by Does not apply route every 14 days 6 each 3    atorvastatin (LIPITOR) 40 MG tablet Take 1 tablet by mouth once daily 90 tablet 3    norethindrone (MAHENDRA) 0.35 MG tablet Take 1 tablet by mouth daily 84 tablet 3    losartan (COZAAR) 50 MG tablet Take 1 tablet by mouth daily 90 tablet 3    FLUoxetine (PROZAC) 20 MG capsule Take 1 capsule by mouth daily 90 capsule 3    dapagliflozin (FARXIGA) 5 MG tablet Take 1 tablet by mouth every morning 90 tablet 3    amLODIPine (NORVASC) 5 MG tablet Take 1 tablet by mouth once daily 90 tablet 3    Glucagon (GVOKE HYPOPEN 2-PACK) 1 MG/0.2ML SOAJ Inject 1 mg into the skin as needed (low blood sugar) 1 each 0     No current facility-administered

## 2024-08-20 LAB
CHOLEST SERPL-MCNC: 100 MG/DL (ref 0–199)
HDLC SERPL-MCNC: 40 MG/DL (ref 40–60)
LDL CHOLESTEROL: 29 MG/DL
TRIGL SERPL-MCNC: 155 MG/DL (ref 0–150)
VLDLC SERPL CALC-MCNC: 31 MG/DL

## 2024-08-21 ENCOUNTER — OFFICE VISIT (OUTPATIENT)
Dept: SURGERY | Age: 49
End: 2024-08-21
Payer: COMMERCIAL

## 2024-08-21 VITALS
WEIGHT: 156 LBS | OXYGEN SATURATION: 97 % | DIASTOLIC BLOOD PRESSURE: 72 MMHG | HEART RATE: 97 BPM | HEIGHT: 64 IN | SYSTOLIC BLOOD PRESSURE: 100 MMHG | BODY MASS INDEX: 26.63 KG/M2

## 2024-08-21 DIAGNOSIS — K42.9 UMBILICAL HERNIA WITHOUT OBSTRUCTION OR GANGRENE: Primary | ICD-10-CM

## 2024-08-21 PROCEDURE — 99212 OFFICE O/P EST SF 10 MIN: CPT | Performed by: PHYSICIAN ASSISTANT

## 2024-08-21 PROCEDURE — 3074F SYST BP LT 130 MM HG: CPT | Performed by: PHYSICIAN ASSISTANT

## 2024-08-21 PROCEDURE — 3078F DIAST BP <80 MM HG: CPT | Performed by: PHYSICIAN ASSISTANT

## 2024-08-21 RX ORDER — METFORMIN HYDROCHLORIDE 500 MG/1
500 TABLET, EXTENDED RELEASE ORAL
COMMUNITY
Start: 2024-08-16

## 2024-08-21 RX ORDER — HYDROCODONE BITARTRATE AND ACETAMINOPHEN 5; 325 MG/1; MG/1
1 TABLET ORAL EVERY 6 HOURS PRN
Qty: 8 TABLET | Refills: 0 | Status: SHIPPED | OUTPATIENT
Start: 2024-08-21 | End: 2024-08-24

## 2024-08-21 ASSESSMENT — ENCOUNTER SYMPTOMS
CHOKING: 0
EYE REDNESS: 0
PHOTOPHOBIA: 0
NAUSEA: 0
STRIDOR: 0
EYE ITCHING: 0
APNEA: 0
ANAL BLEEDING: 0
VOMITING: 0
CONSTIPATION: 0
SORE THROAT: 0
ABDOMINAL PAIN: 1
COLOR CHANGE: 0
RECTAL PAIN: 0
BACK PAIN: 0

## 2024-08-21 NOTE — PROGRESS NOTES
Diagnosis Date    Abnormal uterine bleeding     Cough     Occ. Productive Cough Clear Sputum    Diabetes mellitus (HCC) Dx 2006 Or 2007    Dysmenorrhea     Fibroids     Hyperlipidemia     Hypertension     \"on medication for high blood pressure of 6- 7 yrs\"    Pelvic pain     Sinus problem     Stage 3a chronic kidney disease (CKD) (HCC)     Teeth missing     Upper And Lower    Viral gastroenteritis     [place] teeth extracted     One [place] Tooth Extracted In Past     Family History   Problem Relation Age of Onset    Arthritis Mother     Diabetes Maternal Uncle     Heart Attack Maternal Grandfather     Diabetes Maternal Grandfather     Breast Cancer Other 80    Ovarian Cancer Neg Hx      Social History     Socioeconomic History    Marital status:      Spouse name: Not on file    Number of children: Not on file    Years of education: Not on file    Highest education level: Not on file   Occupational History    Not on file   Tobacco Use    Smoking status: Light Smoker     Types: Cigarettes, Cigars     Start date: 4/15/2000    Smokeless tobacco: Never    Tobacco comments:     \"I Smoke Black And Mild, They Are Like Little Cigars, Only Smoke Occ\"   Vaping Use    Vaping status: Never Used   Substance and Sexual Activity    Alcohol use: Yes     Comment: \"Occ. A Couple Times A Week\"    Drug use: No    Sexual activity: Yes     Partners: Male   Other Topics Concern    Not on file   Social History Narrative    Not on file     Social Determinants of Health     Financial Resource Strain: Low Risk  (5/16/2024)    Overall Financial Resource Strain (CARDIA)     Difficulty of Paying Living Expenses: Not hard at all   Food Insecurity: No Food Insecurity (5/16/2024)    Hunger Vital Sign     Worried About Running Out of Food in the Last Year: Never true     Ran Out of Food in the Last Year: Never true   Transportation Needs: Unknown (5/16/2024)    PRAPARE - Transportation     Lack of Transportation (Medical): Not on file     
Additional Notes: Is this a multibacterial infection of the scrotum?  Was he mistakenly using FLUOROURACIL here instead of HC cream?  Or is it something else?  \\n\\nBacterial culture taken. \\n\\nStart MUPIROCIN jack TID x 2w.\\nStart ZINC OXIDE TID x 2w.\\n\\nRTC 3 weeks.
Detail Level: Detailed

## 2024-09-03 RX ORDER — SEMAGLUTIDE 0.68 MG/ML
INJECTION, SOLUTION SUBCUTANEOUS
Qty: 3 ML | Refills: 2 | Status: SHIPPED | OUTPATIENT
Start: 2024-09-03

## 2024-09-10 ENCOUNTER — HOSPITAL ENCOUNTER (OUTPATIENT)
Age: 49
Discharge: HOME | End: 2024-09-10
Payer: COMMERCIAL

## 2024-09-10 DIAGNOSIS — N18.31 STAGE 3A CHRONIC KIDNEY DISEASE (HCC): ICD-10-CM

## 2024-09-10 LAB
ALBUMIN: 3.9 G/DL (ref 3.4–5)
ANION GAP SERPL CALCULATED.3IONS-SCNC: 15 MMOL/L (ref 4–16)
ANION GAP SERPL CALCULATED.3IONS-SCNC: 15 MMOL/L (ref 4–16)
BILIRUB UR QL STRIP: NEGATIVE
BUN SERPL-MCNC: 13 MG/DL (ref 6–23)
BUN SERPL-MCNC: 13 MG/DL (ref 6–23)
CALCIUM SERPL-MCNC: 8.5 MG/DL (ref 8.3–10.6)
CALCIUM SERPL-MCNC: 8.6 MG/DL (ref 8.3–10.6)
CHLORIDE SERPL-SCNC: 103 MMOL/L (ref 99–110)
CHLORIDE SERPL-SCNC: 103 MMOL/L (ref 99–110)
CLARITY UR: CLEAR
CO2 SERPL-SCNC: 21 MMOL/L (ref 21–32)
CO2 SERPL-SCNC: 21 MMOL/L (ref 21–32)
COLOR UR: YELLOW
COMMENT: ABNORMAL
CREAT SERPL-MCNC: 1.2 MG/DL (ref 0.6–1.1)
CREAT SERPL-MCNC: 1.2 MG/DL (ref 0.6–1.1)
CREAT UR-MCNC: 98.9 MG/DL (ref 28–217)
CREAT UR-MCNC: 99.5 MG/DL (ref 28–217)
GFR, ESTIMATED: 55 ML/MIN/1.73M2
GFR, ESTIMATED: 55 ML/MIN/1.73M2
GLUCOSE SERPL-MCNC: 106 MG/DL (ref 70–99)
GLUCOSE SERPL-MCNC: 107 MG/DL (ref 70–99)
GLUCOSE UR STRIP-MCNC: 500 MG/DL
HGB UR QL STRIP.AUTO: NEGATIVE
KETONES UR STRIP-MCNC: NEGATIVE MG/DL
LEUKOCYTE ESTERASE UR QL STRIP: NEGATIVE
MICROALBUMIN UR-MCNC: 2 MG/L
MICROALBUMIN/CREAT UR-RTO: 2 MCG/MG CREAT (ref 0–2)
NITRITE UR QL STRIP: NEGATIVE
PH UR STRIP: 6 [PH] (ref 5–8)
PHOSPHATE SERPL-MCNC: 4.2 MG/DL (ref 2.5–4.9)
POTASSIUM SERPL-SCNC: 3.8 MMOL/L (ref 3.5–5.1)
POTASSIUM SERPL-SCNC: 3.8 MMOL/L (ref 3.5–5.1)
PROT UR STRIP-MCNC: NEGATIVE MG/DL
SODIUM SERPL-SCNC: 139 MMOL/L (ref 135–145)
SODIUM SERPL-SCNC: 139 MMOL/L (ref 135–145)
SP GR UR STRIP: <1.005 (ref 1–1.03)
TOTAL PROTEIN, URINE: 20 MG/DL
URINE TOTAL PROTEIN CREATININE RATIO: 0.2 (ref 0–0.2)
UROBILINOGEN UR STRIP-ACNC: 0.2 EU/DL (ref 0–1)

## 2024-09-10 PROCEDURE — 80048 BASIC METABOLIC PNL TOTAL CA: CPT

## 2024-09-10 PROCEDURE — 36415 COLL VENOUS BLD VENIPUNCTURE: CPT

## 2024-09-10 PROCEDURE — 81003 URINALYSIS AUTO W/O SCOPE: CPT

## 2024-09-10 PROCEDURE — 82570 ASSAY OF URINE CREATININE: CPT

## 2024-09-10 PROCEDURE — 82043 UR ALBUMIN QUANTITATIVE: CPT

## 2024-09-10 PROCEDURE — 80069 RENAL FUNCTION PANEL: CPT

## 2024-09-10 PROCEDURE — 84156 ASSAY OF PROTEIN URINE: CPT

## 2024-09-12 ENCOUNTER — OFFICE VISIT (OUTPATIENT)
Dept: SURGERY | Age: 49
End: 2024-09-12
Payer: COMMERCIAL

## 2024-09-12 VITALS
OXYGEN SATURATION: 97 % | DIASTOLIC BLOOD PRESSURE: 70 MMHG | BODY MASS INDEX: 26.84 KG/M2 | HEART RATE: 93 BPM | HEIGHT: 64 IN | SYSTOLIC BLOOD PRESSURE: 116 MMHG | WEIGHT: 157.2 LBS

## 2024-09-12 DIAGNOSIS — K42.9 UMBILICAL HERNIA WITHOUT OBSTRUCTION AND WITHOUT GANGRENE: Primary | ICD-10-CM

## 2024-09-12 PROCEDURE — 3078F DIAST BP <80 MM HG: CPT | Performed by: PHYSICIAN ASSISTANT

## 2024-09-12 PROCEDURE — 99212 OFFICE O/P EST SF 10 MIN: CPT | Performed by: PHYSICIAN ASSISTANT

## 2024-09-12 PROCEDURE — 3074F SYST BP LT 130 MM HG: CPT | Performed by: PHYSICIAN ASSISTANT

## 2024-09-16 ASSESSMENT — ENCOUNTER SYMPTOMS
SORE THROAT: 0
EYE ITCHING: 0
CONSTIPATION: 0
PHOTOPHOBIA: 0
BACK PAIN: 0
APNEA: 0
VOMITING: 0
STRIDOR: 0
CHOKING: 0
NAUSEA: 0
EYE REDNESS: 0
RECTAL PAIN: 0
ABDOMINAL PAIN: 1
ANAL BLEEDING: 0
COLOR CHANGE: 0

## 2024-10-07 ENCOUNTER — INITIAL CONSULT (OUTPATIENT)
Dept: OBGYN | Age: 49
End: 2024-10-07
Payer: COMMERCIAL

## 2024-10-07 ENCOUNTER — HOSPITAL ENCOUNTER (OUTPATIENT)
Age: 49
Setting detail: SPECIMEN
Discharge: HOME | End: 2024-10-07
Payer: COMMERCIAL

## 2024-10-07 ENCOUNTER — HOSPITAL ENCOUNTER (OUTPATIENT)
Age: 49
Discharge: HOME | End: 2024-10-07
Payer: COMMERCIAL

## 2024-10-07 VITALS
WEIGHT: 162 LBS | DIASTOLIC BLOOD PRESSURE: 77 MMHG | SYSTOLIC BLOOD PRESSURE: 113 MMHG | BODY MASS INDEX: 27.66 KG/M2 | HEIGHT: 64 IN

## 2024-10-07 DIAGNOSIS — Z01.419 ENCOUNTER FOR ANNUAL ROUTINE GYNECOLOGICAL EXAMINATION: Primary | ICD-10-CM

## 2024-10-07 DIAGNOSIS — Z11.51 ENCOUNTER FOR SCREENING FOR HUMAN PAPILLOMAVIRUS (HPV): ICD-10-CM

## 2024-10-07 DIAGNOSIS — N83.202 LEFT OVARIAN CYST: ICD-10-CM

## 2024-10-07 DIAGNOSIS — Z12.31 SCREENING MAMMOGRAM FOR BREAST CANCER: ICD-10-CM

## 2024-10-07 DIAGNOSIS — N94.89 ADNEXAL MASS: ICD-10-CM

## 2024-10-07 LAB
ANION GAP SERPL CALCULATED.3IONS-SCNC: 16 MMOL/L (ref 4–16)
BUN SERPL-MCNC: 16 MG/DL (ref 6–23)
CALCIUM SERPL-MCNC: 9 MG/DL (ref 8.3–10.6)
CHLORIDE SERPL-SCNC: 98 MMOL/L (ref 99–110)
CO2 SERPL-SCNC: 22 MMOL/L (ref 21–32)
CREAT SERPL-MCNC: 1.2 MG/DL (ref 0.6–1.1)
GFR, ESTIMATED: 55 ML/MIN/1.73M2
GLUCOSE SERPL-MCNC: 117 MG/DL (ref 70–99)
POTASSIUM SERPL-SCNC: 4.4 MMOL/L (ref 3.5–5.1)
SODIUM SERPL-SCNC: 136 MMOL/L (ref 135–145)

## 2024-10-07 PROCEDURE — 36415 COLL VENOUS BLD VENIPUNCTURE: CPT

## 2024-10-07 PROCEDURE — 3074F SYST BP LT 130 MM HG: CPT | Performed by: OBSTETRICS & GYNECOLOGY

## 2024-10-07 PROCEDURE — 80048 BASIC METABOLIC PNL TOTAL CA: CPT

## 2024-10-07 PROCEDURE — G0123 SCREEN CERV/VAG THIN LAYER: HCPCS

## 2024-10-07 PROCEDURE — 99396 PREV VISIT EST AGE 40-64: CPT | Performed by: OBSTETRICS & GYNECOLOGY

## 2024-10-07 PROCEDURE — 3078F DIAST BP <80 MM HG: CPT | Performed by: OBSTETRICS & GYNECOLOGY

## 2024-10-07 NOTE — PROGRESS NOTES
10/7/24    Clara Laughlin  1975    Chief Complaint   Patient presents with    Consultation     Pt here for consult due to  Cyst of left ovary. Pt had referral placed by Alexsander Ag DO. Denies pain.     Gynecologic Exam     Pt here for exam. supracervical ABD hysterectomy/ repair of serosal tear of bowel. No hrt. Is sexually active. Pap smear- piror to hysterectomy. Mammo- 1/2023 BIRADS - CATEGORY 1.         Clara Laughlin is a 49 y.o. female who presents today for evaluation of annual exam.   No pain    Past Medical History:   Diagnosis Date    Abnormal uterine bleeding     Cough     Occ. Productive Cough Clear Sputum    Cyst of left ovary     Diabetes mellitus (HCC) Dx 2006 Or 2007    Dysmenorrhea     Fibroids     Hyperlipidemia     Hypertension     \"on medication for high blood pressure of 6- 7 yrs\"    Pelvic pain     Sinus problem     Stage 3a chronic kidney disease (CKD) (HCC)     Teeth missing     Upper And Lower    Viral gastroenteritis     [place] teeth extracted     One [place] Tooth Extracted In Past       Past Surgical History:   Procedure Laterality Date    CHOLECYSTECTOMY, LAPAROSCOPIC  7-7-15    COLONOSCOPY  2000's    COLONOSCOPY N/A 3/9/2023    COLONOSCOPY DIAGNOSTIC performed by Ivy Lamb MD at [facility] ENDOSCOPY    ENDOMETRIAL ABLATION  2012    Tubal Ligation Also Done    HYSTERECTOMY (CERVIX STATUS UNKNOWN)  4/19/16    supracervical ABD hysterectomy/ repair of serosal tear of bowel    TUBAL LIGATION  2012    Done With Endometrial Ablation    UMBILICAL HERNIA REPAIR N/A 8/9/2024    OPEN HERNIA UMBILICAL REPAIR WITH MESH performed by Shree Giordano MD at [facility] OR    WISDOM TOOTH EXTRACTION      One [place] Tooth Extracted In Past       Social History     Tobacco Use    Smoking status: Light Smoker     Types: Cigarettes, Cigars     Start date: 4/15/2000    Smokeless tobacco: Never    Tobacco comments:     \"I Smoke Black And Mild, They Are Like Little Cigars, Only Smoke Occ\"

## 2024-10-09 LAB
COMMENT: NORMAL
HPV OTHER HR TYPES: NOT DETECTED
HPV TYPE 16: NOT DETECTED
HPV TYPE 18: NOT DETECTED

## 2024-10-10 LAB — GYNECOLOGY CYTOLOGY REPORT: NORMAL

## 2024-10-11 NOTE — PROGRESS NOTES
Pt informed and scheduled.    Skin normal color for race, warm, dry and intact. No evidence of rash.

## 2024-10-19 ENCOUNTER — HOSPITAL ENCOUNTER (OUTPATIENT)
Dept: MRI IMAGING | Age: 49
Discharge: HOME | End: 2024-10-19
Attending: OBSTETRICS & GYNECOLOGY
Payer: COMMERCIAL

## 2024-10-19 DIAGNOSIS — N83.202 LEFT OVARIAN CYST: ICD-10-CM

## 2024-10-19 DIAGNOSIS — N94.89 ADNEXAL MASS: ICD-10-CM

## 2024-10-19 PROCEDURE — A9579 GAD-BASE MR CONTRAST NOS,1ML: HCPCS | Performed by: OBSTETRICS & GYNECOLOGY

## 2024-10-19 PROCEDURE — 72197 MRI PELVIS W/O & W/DYE: CPT

## 2024-10-19 PROCEDURE — 6360000004 HC RX CONTRAST MEDICATION: Performed by: OBSTETRICS & GYNECOLOGY

## 2024-10-19 RX ADMIN — GADOTERIDOL 16 ML: 279.3 INJECTION, SOLUTION INTRAVENOUS at 11:55

## 2024-10-22 ENCOUNTER — OFFICE VISIT (OUTPATIENT)
Dept: OBGYN | Age: 49
End: 2024-10-22
Payer: COMMERCIAL

## 2024-10-22 VITALS
DIASTOLIC BLOOD PRESSURE: 66 MMHG | BODY MASS INDEX: 27.66 KG/M2 | SYSTOLIC BLOOD PRESSURE: 121 MMHG | HEART RATE: 103 BPM | HEIGHT: 64 IN | WEIGHT: 162 LBS

## 2024-10-22 DIAGNOSIS — N83.202 LEFT OVARIAN CYST: Primary | ICD-10-CM

## 2024-10-22 PROCEDURE — 3074F SYST BP LT 130 MM HG: CPT | Performed by: OBSTETRICS & GYNECOLOGY

## 2024-10-22 PROCEDURE — 99213 OFFICE O/P EST LOW 20 MIN: CPT | Performed by: OBSTETRICS & GYNECOLOGY

## 2024-10-22 PROCEDURE — 3078F DIAST BP <80 MM HG: CPT | Performed by: OBSTETRICS & GYNECOLOGY

## 2024-10-22 PROCEDURE — 36415 COLL VENOUS BLD VENIPUNCTURE: CPT | Performed by: OBSTETRICS & GYNECOLOGY

## 2024-10-22 NOTE — PROGRESS NOTES
Pneumococcal, PPSV23, PNEUMOVAX 23, (age 2y+), SC/IM, 0.5mL 11/01/2015    TDaP, ADACEL (age 10y-64y), BOOSTRIX (age 10y+), IM, 0.5mL 06/12/2017       Review of Systems    /66 (Site: Right Upper Arm, Position: Sitting, Cuff Size: Small Adult)   Pulse (!) 103   Ht 1.626 m (5' 4\")   Wt 73.5 kg (162 lb)   LMP  (LMP Unknown)   BMI 27.81 kg/m²     Physical Exam  Constitutional:       General: She is not in acute distress.     Appearance: Normal appearance. She is not ill-appearing.   HENT:      Head: Normocephalic.      Nose: Nose normal.   Eyes:      General: No scleral icterus.        Right eye: No discharge.         Left eye: No discharge.   Cardiovascular:      Pulses: Normal pulses.   Pulmonary:      Effort: Pulmonary effort is normal.   Abdominal:      General: Abdomen is flat. There is no distension.      Palpations: Abdomen is soft. There is no mass.      Tenderness: There is no abdominal tenderness.      Hernia: No hernia is present.   Musculoskeletal:         General: Normal range of motion.      Cervical back: Normal range of motion and neck supple. No rigidity.   Skin:     General: Skin is warm and dry.   Neurological:      General: No focal deficit present.      Mental Status: She is alert and oriented to person, place, and time.   Psychiatric:         Mood and Affect: Mood normal.         Behavior: Behavior normal.         No results found for this visit on 10/22/24.    ASSESSMENT AND PLAN   Diagnosis Orders   1. Left ovarian cyst  US NON OB TRANSVAGINAL            No change in size since may 2024  Reviewed monitoring versus surgery.      Return in about 3 months (around 1/22/2025).    Willi Amin MD

## 2024-10-23 LAB — CANCER AG125 SERPL-ACNC: 7 U/ML (ref 0–35)

## 2024-11-19 ENCOUNTER — OFFICE VISIT (OUTPATIENT)
Age: 49
End: 2024-11-19
Payer: COMMERCIAL

## 2024-11-19 VITALS
HEIGHT: 64 IN | WEIGHT: 161.2 LBS | RESPIRATION RATE: 16 BRPM | HEART RATE: 100 BPM | BODY MASS INDEX: 27.52 KG/M2 | DIASTOLIC BLOOD PRESSURE: 64 MMHG | OXYGEN SATURATION: 97 % | SYSTOLIC BLOOD PRESSURE: 118 MMHG

## 2024-11-19 DIAGNOSIS — E11.9 TYPE 2 DIABETES MELLITUS WITHOUT COMPLICATION, WITHOUT LONG-TERM CURRENT USE OF INSULIN (HCC): Primary | ICD-10-CM

## 2024-11-19 DIAGNOSIS — Z30.41 ENCOUNTER FOR SURVEILLANCE OF CONTRACEPTIVE PILLS: ICD-10-CM

## 2024-11-19 DIAGNOSIS — I10 ESSENTIAL HYPERTENSION: ICD-10-CM

## 2024-11-19 DIAGNOSIS — N18.9 CHRONIC KIDNEY DISEASE, UNSPECIFIED CKD STAGE: ICD-10-CM

## 2024-11-19 DIAGNOSIS — F41.9 ANXIETY: ICD-10-CM

## 2024-11-19 LAB — HBA1C MFR BLD: 6.6 %

## 2024-11-19 PROCEDURE — 3044F HG A1C LEVEL LT 7.0%: CPT

## 2024-11-19 PROCEDURE — 3078F DIAST BP <80 MM HG: CPT

## 2024-11-19 PROCEDURE — 83036 HEMOGLOBIN GLYCOSYLATED A1C: CPT

## 2024-11-19 PROCEDURE — 3074F SYST BP LT 130 MM HG: CPT

## 2024-11-19 PROCEDURE — 99214 OFFICE O/P EST MOD 30 MIN: CPT

## 2024-11-19 RX ORDER — HYDROCHLOROTHIAZIDE 12.5 MG/1
CAPSULE ORAL
Qty: 3 EACH | Refills: 2 | Status: SHIPPED | OUTPATIENT
Start: 2024-11-19

## 2024-11-19 RX ORDER — AMLODIPINE BESYLATE 5 MG/1
TABLET ORAL
Qty: 90 TABLET | Refills: 3 | Status: SHIPPED | OUTPATIENT
Start: 2024-11-19

## 2024-11-19 RX ORDER — GLUCAGON INJECTION, SOLUTION 1 MG/.2ML
1 INJECTION, SOLUTION SUBCUTANEOUS PRN
Qty: 1 EACH | Refills: 0 | Status: SHIPPED | OUTPATIENT
Start: 2024-11-19

## 2024-11-19 RX ORDER — LOSARTAN POTASSIUM 50 MG/1
50 TABLET ORAL DAILY
Qty: 90 TABLET | Refills: 3 | Status: SHIPPED | OUTPATIENT
Start: 2024-11-19

## 2024-11-19 RX ORDER — DAPAGLIFLOZIN 5 MG/1
5 TABLET, FILM COATED ORAL EVERY MORNING
Qty: 90 TABLET | Refills: 3 | Status: SHIPPED | OUTPATIENT
Start: 2024-11-19

## 2024-11-19 RX ORDER — ATORVASTATIN CALCIUM 40 MG/1
TABLET, FILM COATED ORAL
Qty: 90 TABLET | Refills: 3 | Status: CANCELLED | OUTPATIENT
Start: 2024-11-19

## 2024-11-19 RX ORDER — ACETAMINOPHEN AND CODEINE PHOSPHATE 120; 12 MG/5ML; MG/5ML
1 SOLUTION ORAL DAILY
Qty: 84 TABLET | Refills: 3 | Status: SHIPPED | OUTPATIENT
Start: 2024-11-19

## 2024-11-19 RX ORDER — ACYCLOVIR 800 MG/1
1 TABLET ORAL
Qty: 6 EACH | Refills: 3 | Status: CANCELLED | OUTPATIENT
Start: 2024-11-19

## 2024-11-19 RX ORDER — SEMAGLUTIDE 0.68 MG/ML
INJECTION, SOLUTION SUBCUTANEOUS
Qty: 3 ML | Refills: 2 | Status: CANCELLED | OUTPATIENT
Start: 2024-11-19

## 2024-11-19 ASSESSMENT — PATIENT HEALTH QUESTIONNAIRE - PHQ9
SUM OF ALL RESPONSES TO PHQ QUESTIONS 1-9: 0
SUM OF ALL RESPONSES TO PHQ9 QUESTIONS 1 & 2: 0
1. LITTLE INTEREST OR PLEASURE IN DOING THINGS: NOT AT ALL
SUM OF ALL RESPONSES TO PHQ QUESTIONS 1-9: 0
SUM OF ALL RESPONSES TO PHQ QUESTIONS 1-9: 0
2. FEELING DOWN, DEPRESSED OR HOPELESS: NOT AT ALL
SUM OF ALL RESPONSES TO PHQ QUESTIONS 1-9: 0

## 2024-11-19 ASSESSMENT — ENCOUNTER SYMPTOMS
GASTROINTESTINAL NEGATIVE: 1
RESPIRATORY NEGATIVE: 1

## 2024-11-19 NOTE — PROGRESS NOTES
Clara Laughlin (:  1975) is a 49 y.o. female,Established patient, here for evaluation of the following chief complaint(s):  Diabetes (Follow up no questions or concerns at this time ) and Flu Vaccine (Already had flu vaccine )      Subjective   Pt here for routine follow up. Pt following with Dr. Alvarez for DM. No questions or concerns today. Pt needing refills for chronic conditions that include HTN/CKD/Anxiety/contraception. Denies SE to medications and is tolerating well. Denies chest pain, SOB, HA, or being lightheaded or dizzy.                   2024     9:12 AM 2024     9:32 AM 2024     9:59 AM 2024     4:45 PM 2023     3:03 PM 10/23/2023     3:03 PM 2023     3:25 PM   PHQ Scores   PHQ2 Score 0 0 0 0 0 0 0   PHQ9 Score 0 0 0 0 0 0 0         I reviewed the medical records and past history for Clara.    Allergies   Allergen Reactions    Pravachol [Pravastatin] Nausea Only       Current Outpatient Medications   Medication Sig Dispense Refill    amLODIPine (NORVASC) 5 MG tablet Take 1 tablet by mouth once daily 90 tablet 3    dapagliflozin (FARXIGA) 5 MG tablet Take 1 tablet by mouth every morning 90 tablet 3    FLUoxetine (PROZAC) 20 MG capsule Take 1 capsule by mouth daily 90 capsule 3    losartan (COZAAR) 50 MG tablet Take 1 tablet by mouth daily 90 tablet 3    norethindrone (MAHENDRA) 0.35 MG tablet Take 1 tablet by mouth daily 84 tablet 3    Glucagon (GVOKE HYPOPEN 2-PACK) 1 MG/0.2ML SOAJ Inject 1 mg into the skin as needed (low blood sugar) 1 each 0    Continuous Glucose Sensor (FREESTYLE IDRIS 3 PLUS SENSOR) MISC Use as directed 3 each 2    glipiZIDE (GLUCOTROL XL) 2.5 MG extended release tablet Take 1 tablet by mouth daily      Semaglutide,0.25 or 0.5MG/DOS, (OZEMPIC, 0.25 OR 0.5 MG/DOSE,) 2 MG/3ML SOPN INJECT 1/2 (ONE-HALF) MG SUBCUTANEOUSLY ONCE A WEEK 3 mL 2    atorvastatin (LIPITOR) 40 MG tablet Take 1 tablet by mouth once daily 90 tablet 3

## 2024-11-19 NOTE — PATIENT INSTRUCTIONS
We are committed to providing you the best care possible.    If you receive a survey after visiting one of our offices, please take time to share your experience concerning your physician office visit.  These surveys are confidential and no health information about you is shared.    We are eager to improve for you and continue to give you satisfactory care, we are counting on your feedback to help make that happen.            Welcome to [facility]:    Did you know we now have a faster way for you to move through your appointment? For your convenience, we now have digital registration available. When you schedule your next appointment, you will receive a link via your email as well as a text message that will allow you to complete any paperwork digitally before your appointment.

## 2024-12-02 ENCOUNTER — OFFICE VISIT (OUTPATIENT)
Age: 49
End: 2024-12-02
Payer: COMMERCIAL

## 2024-12-02 VITALS
BODY MASS INDEX: 27.49 KG/M2 | SYSTOLIC BLOOD PRESSURE: 116 MMHG | HEIGHT: 64 IN | HEART RATE: 104 BPM | WEIGHT: 161 LBS | DIASTOLIC BLOOD PRESSURE: 60 MMHG | OXYGEN SATURATION: 100 % | TEMPERATURE: 98.1 F

## 2024-12-02 DIAGNOSIS — R11.2 NAUSEA AND VOMITING, UNSPECIFIED VOMITING TYPE: Primary | ICD-10-CM

## 2024-12-02 PROCEDURE — 99213 OFFICE O/P EST LOW 20 MIN: CPT | Performed by: NURSE PRACTITIONER

## 2024-12-02 PROCEDURE — 3078F DIAST BP <80 MM HG: CPT | Performed by: NURSE PRACTITIONER

## 2024-12-02 PROCEDURE — 3074F SYST BP LT 130 MM HG: CPT | Performed by: NURSE PRACTITIONER

## 2024-12-02 RX ORDER — ONDANSETRON 4 MG/1
4 TABLET, ORALLY DISINTEGRATING ORAL 3 TIMES DAILY PRN
Qty: 21 TABLET | Refills: 0 | Status: SHIPPED | OUTPATIENT
Start: 2024-12-02

## 2024-12-02 ASSESSMENT — ENCOUNTER SYMPTOMS
SWOLLEN GLANDS: 0
VOMITING: 1
SORE THROAT: 0
ABDOMINAL PAIN: 1
NAUSEA: 1
COUGH: 0
VISUAL CHANGE: 0
CHANGE IN BOWEL HABIT: 0

## 2024-12-02 NOTE — PROGRESS NOTES
Clara Laughlin (:  1975) is a 49 y.o. female,Established patient, here for evaluation of the following chief complaint(s):    Nausea & Vomiting (This am. Not able to keep water down. Last got sick 11:30 today. Has been NPO since. )      SUBJECTIVE/OBJECTIVE:  Pt presents with c/o as stated below    Nausea & Vomiting  This is a new problem. The current episode started today. The problem occurs intermittently. The problem has been unchanged. Associated symptoms include abdominal pain (cramping), chills, fatigue, nausea and vomiting. Pertinent negatives include no anorexia, arthralgias, change in bowel habit, chest pain, congestion, coughing, diaphoresis, fever, headaches, joint swelling, myalgias, neck pain, numbness, rash, sore throat, swollen glands, urinary symptoms, vertigo, visual change or weakness. The symptoms are aggravated by drinking. She has tried nothing for the symptoms. The treatment provided no relief.       Allergies   Allergen Reactions    Pravachol [Pravastatin] Nausea Only        Current Outpatient Medications   Medication Sig Dispense Refill    ondansetron (ZOFRAN-ODT) 4 MG disintegrating tablet Take 1 tablet by mouth 3 times daily as needed for Nausea or Vomiting 21 tablet 0    amLODIPine (NORVASC) 5 MG tablet Take 1 tablet by mouth once daily 90 tablet 3    dapagliflozin (FARXIGA) 5 MG tablet Take 1 tablet by mouth every morning 90 tablet 3    FLUoxetine (PROZAC) 20 MG capsule Take 1 capsule by mouth daily 90 capsule 3    losartan (COZAAR) 50 MG tablet Take 1 tablet by mouth daily 90 tablet 3    norethindrone (MAHENDRA) 0.35 MG tablet Take 1 tablet by mouth daily 84 tablet 3    Glucagon (GVOKE HYPOPEN 2-PACK) 1 MG/0.2ML SOAJ Inject 1 mg into the skin as needed (low blood sugar) 1 each 0    glipiZIDE (GLUCOTROL XL) 2.5 MG extended release tablet Take 1 tablet by mouth daily      Semaglutide,0.25 or 0.5MG/DOS, (OZEMPIC, 0.25 OR 0.5 MG/DOSE,) 2 MG/3ML SOPN INJECT 1/2 (ONE-HALF) MG

## 2024-12-10 ENCOUNTER — HOSPITAL ENCOUNTER (OUTPATIENT)
Dept: MAMMOGRAPHY | Age: 49
Discharge: HOME | End: 2024-12-10
Payer: COMMERCIAL

## 2024-12-10 VITALS — BODY MASS INDEX: 26.12 KG/M2 | HEIGHT: 64 IN | WEIGHT: 153 LBS

## 2024-12-10 DIAGNOSIS — Z12.31 SCREENING MAMMOGRAM FOR BREAST CANCER: ICD-10-CM

## 2024-12-10 PROCEDURE — 77067 SCR MAMMO BI INCL CAD: CPT

## 2024-12-10 PROCEDURE — 77063 BREAST TOMOSYNTHESIS BI: CPT

## 2024-12-20 ENCOUNTER — OFFICE VISIT (OUTPATIENT)
Age: 49
End: 2024-12-20
Payer: COMMERCIAL

## 2024-12-20 VITALS
SYSTOLIC BLOOD PRESSURE: 126 MMHG | OXYGEN SATURATION: 98 % | TEMPERATURE: 98.7 F | DIASTOLIC BLOOD PRESSURE: 70 MMHG | HEART RATE: 107 BPM

## 2024-12-20 DIAGNOSIS — R68.83 CHILLS: ICD-10-CM

## 2024-12-20 DIAGNOSIS — R52 GENERALIZED BODY ACHES: ICD-10-CM

## 2024-12-20 DIAGNOSIS — B34.9 VIRAL ILLNESS: Primary | ICD-10-CM

## 2024-12-20 DIAGNOSIS — R11.0 NAUSEA: ICD-10-CM

## 2024-12-20 LAB
INFLUENZA A ANTIGEN, POC: NEGATIVE
INFLUENZA B ANTIGEN, POC: NEGATIVE
LOT EXPIRE DATE: NORMAL
LOT KIT NUMBER: NORMAL
SARS-COV-2, POC: NORMAL
VALID INTERNAL CONTROL: NORMAL
VENDOR AND KIT NAME POC: NORMAL

## 2024-12-20 PROCEDURE — 99213 OFFICE O/P EST LOW 20 MIN: CPT | Performed by: NURSE PRACTITIONER

## 2024-12-20 PROCEDURE — 87428 SARSCOV & INF VIR A&B AG IA: CPT | Performed by: NURSE PRACTITIONER

## 2024-12-20 PROCEDURE — 3078F DIAST BP <80 MM HG: CPT | Performed by: NURSE PRACTITIONER

## 2024-12-20 PROCEDURE — 3074F SYST BP LT 130 MM HG: CPT | Performed by: NURSE PRACTITIONER

## 2024-12-20 RX ORDER — ONDANSETRON 4 MG/1
4 TABLET, ORALLY DISINTEGRATING ORAL 3 TIMES DAILY PRN
Qty: 21 TABLET | Refills: 0 | Status: SHIPPED | OUTPATIENT
Start: 2024-12-20

## 2024-12-20 ASSESSMENT — ENCOUNTER SYMPTOMS
SORE THROAT: 1
SWOLLEN GLANDS: 0
DIARRHEA: 1
RHINORRHEA: 0
NAUSEA: 1
ABDOMINAL PAIN: 0
SINUS PAIN: 0
SHORTNESS OF BREATH: 0
VOMITING: 0
COUGH: 0
WHEEZING: 0

## 2024-12-20 NOTE — PROGRESS NOTES
Clara Laughlin (:  1975) is a 49 y.o. female,Established patient, here for evaluation of the following chief complaint(s):    Generalized Body Aches (Starting this am. Just had a cold but was all better from that. ), Chills, Headache, and Nausea      SUBJECTIVE/OBJECTIVE:  Pt presents with c/o as stated below- was recently ill and got better last week but woke up this morning with body aches and chills - has been exposed to ill coworkers - did receive flu vaccination this year     Cold Symptoms   This is a new problem. The current episode started today. The problem has been unchanged. There has been no fever. Associated symptoms include diarrhea, headaches, nausea and a sore throat (with swallowing). Pertinent negatives include no abdominal pain, chest pain, congestion, coughing, dysuria, ear pain, joint pain, joint swelling, neck pain, plugged ear sensation, rash, rhinorrhea, sinus pain, sneezing, swollen glands, vomiting or wheezing.       Allergies   Allergen Reactions    Pravachol [Pravastatin] Nausea Only        Current Outpatient Medications   Medication Sig Dispense Refill    ondansetron (ZOFRAN-ODT) 4 MG disintegrating tablet Take 1 tablet by mouth 3 times daily as needed for Nausea or Vomiting 21 tablet 0    amLODIPine (NORVASC) 5 MG tablet Take 1 tablet by mouth once daily 90 tablet 3    dapagliflozin (FARXIGA) 5 MG tablet Take 1 tablet by mouth every morning 90 tablet 3    FLUoxetine (PROZAC) 20 MG capsule Take 1 capsule by mouth daily 90 capsule 3    losartan (COZAAR) 50 MG tablet Take 1 tablet by mouth daily 90 tablet 3    norethindrone (MAHENDRA) 0.35 MG tablet Take 1 tablet by mouth daily 84 tablet 3    Glucagon (GVOKE HYPOPEN 2-PACK) 1 MG/0.2ML SOAJ Inject 1 mg into the skin as needed (low blood sugar) 1 each 0    glipiZIDE (GLUCOTROL XL) 2.5 MG extended release tablet Take 1 tablet by mouth daily      Semaglutide,0.25 or 0.5MG/DOS, (OZEMPIC, 0.25 OR 0.5 MG/DOSE,) 2 MG/3ML SOPN INJECT

## 2025-01-15 ENCOUNTER — OFFICE VISIT (OUTPATIENT)
Age: 50
End: 2025-01-15
Payer: COMMERCIAL

## 2025-01-15 VITALS
OXYGEN SATURATION: 98 % | SYSTOLIC BLOOD PRESSURE: 128 MMHG | DIASTOLIC BLOOD PRESSURE: 72 MMHG | WEIGHT: 154.4 LBS | RESPIRATION RATE: 20 BRPM | HEART RATE: 105 BPM | BODY MASS INDEX: 26.5 KG/M2

## 2025-01-15 DIAGNOSIS — F41.9 ANXIETY: Primary | ICD-10-CM

## 2025-01-15 DIAGNOSIS — F32.A ACUTE DEPRESSION: ICD-10-CM

## 2025-01-15 PROCEDURE — 3074F SYST BP LT 130 MM HG: CPT

## 2025-01-15 PROCEDURE — 99214 OFFICE O/P EST MOD 30 MIN: CPT

## 2025-01-15 PROCEDURE — 3078F DIAST BP <80 MM HG: CPT

## 2025-01-15 RX ORDER — FLUOXETINE 40 MG/1
40 CAPSULE ORAL DAILY
Qty: 30 CAPSULE | Refills: 0 | Status: SHIPPED | OUTPATIENT
Start: 2025-01-15

## 2025-01-15 SDOH — ECONOMIC STABILITY: FOOD INSECURITY: WITHIN THE PAST 12 MONTHS, THE FOOD YOU BOUGHT JUST DIDN'T LAST AND YOU DIDN'T HAVE MONEY TO GET MORE.: NEVER TRUE

## 2025-01-15 SDOH — ECONOMIC STABILITY: FOOD INSECURITY: WITHIN THE PAST 12 MONTHS, YOU WORRIED THAT YOUR FOOD WOULD RUN OUT BEFORE YOU GOT MONEY TO BUY MORE.: NEVER TRUE

## 2025-01-15 ASSESSMENT — PATIENT HEALTH QUESTIONNAIRE - PHQ9
1. LITTLE INTEREST OR PLEASURE IN DOING THINGS: MORE THAN HALF THE DAYS
8. MOVING OR SPEAKING SO SLOWLY THAT OTHER PEOPLE COULD HAVE NOTICED. OR THE OPPOSITE, BEING SO FIGETY OR RESTLESS THAT YOU HAVE BEEN MOVING AROUND A LOT MORE THAN USUAL: NOT AT ALL
5. POOR APPETITE OR OVEREATING: NOT AT ALL
SUM OF ALL RESPONSES TO PHQ QUESTIONS 1-9: 8
SUM OF ALL RESPONSES TO PHQ QUESTIONS 1-9: 8
3. TROUBLE FALLING OR STAYING ASLEEP: MORE THAN HALF THE DAYS
6. FEELING BAD ABOUT YOURSELF - OR THAT YOU ARE A FAILURE OR HAVE LET YOURSELF OR YOUR FAMILY DOWN: SEVERAL DAYS
10. IF YOU CHECKED OFF ANY PROBLEMS, HOW DIFFICULT HAVE THESE PROBLEMS MADE IT FOR YOU TO DO YOUR WORK, TAKE CARE OF THINGS AT HOME, OR GET ALONG WITH OTHER PEOPLE: SOMEWHAT DIFFICULT
4. FEELING TIRED OR HAVING LITTLE ENERGY: SEVERAL DAYS
SUM OF ALL RESPONSES TO PHQ QUESTIONS 1-9: 8
2. FEELING DOWN, DEPRESSED OR HOPELESS: MORE THAN HALF THE DAYS
SUM OF ALL RESPONSES TO PHQ QUESTIONS 1-9: 8
9. THOUGHTS THAT YOU WOULD BE BETTER OFF DEAD, OR OF HURTING YOURSELF: NOT AT ALL
SUM OF ALL RESPONSES TO PHQ9 QUESTIONS 1 & 2: 4
7. TROUBLE CONCENTRATING ON THINGS, SUCH AS READING THE NEWSPAPER OR WATCHING TELEVISION: NOT AT ALL

## 2025-01-15 ASSESSMENT — ENCOUNTER SYMPTOMS
GASTROINTESTINAL NEGATIVE: 1
RESPIRATORY NEGATIVE: 1

## 2025-01-15 NOTE — PROGRESS NOTES
Clara Laughlin (:  1975) is a 49 y.o. female,Established patient, here for evaluation of the following chief complaint(s):  Discuss Medications (Wants to discuss meds)      Subjective   Pt here for routine follow up.     Anxiety- Currently taking prozac.  Patient reports she has recently noticed an increase of low motivation, not wanting to get out of bed, and increased anxiety daily.  She reports she has been dealing with some family issues that has increased stress therefore increasing other symptoms she is experiencing.  Denies episodic anxiety.  Patient is interested in increasing medication. Denies SI/HI.               1/15/2025     1:51 PM 2024     9:12 AM 2024     9:32 AM 2024     9:59 AM 2024     4:45 PM 2023     3:03 PM 10/23/2023     3:03 PM   PHQ Scores   PHQ2 Score 4 0 0 0 0 0 0   PHQ9 Score 8 0 0 0 0 0 0         I reviewed the medical records and past history for Clara.    Allergies   Allergen Reactions    Pravachol [Pravastatin] Nausea Only       Current Outpatient Medications   Medication Sig Dispense Refill    FLUoxetine (PROZAC) 40 MG capsule Take 1 capsule by mouth daily 30 capsule 0    ondansetron (ZOFRAN-ODT) 4 MG disintegrating tablet Take 1 tablet by mouth 3 times daily as needed for Nausea or Vomiting 21 tablet 0    amLODIPine (NORVASC) 5 MG tablet Take 1 tablet by mouth once daily 90 tablet 3    dapagliflozin (FARXIGA) 5 MG tablet Take 1 tablet by mouth every morning 90 tablet 3    losartan (COZAAR) 50 MG tablet Take 1 tablet by mouth daily 90 tablet 3    norethindrone (MAHENDRA) 0.35 MG tablet Take 1 tablet by mouth daily 84 tablet 3    Glucagon (GVOKE HYPOPEN 2-PACK) 1 MG/0.2ML SOAJ Inject 1 mg into the skin as needed (low blood sugar) 1 each 0    glipiZIDE (GLUCOTROL XL) 2.5 MG extended release tablet Take 1 tablet by mouth daily      Semaglutide,0.25 or 0.5MG/DOS, (OZEMPIC, 0.25 OR 0.5 MG/DOSE,) 2 MG/3ML SOPN INJECT 1/2 (ONE-HALF) MG

## 2025-01-21 ENCOUNTER — OFFICE VISIT (OUTPATIENT)
Dept: OBGYN | Age: 50
End: 2025-01-21
Payer: COMMERCIAL

## 2025-01-21 VITALS
HEIGHT: 64 IN | SYSTOLIC BLOOD PRESSURE: 115 MMHG | DIASTOLIC BLOOD PRESSURE: 74 MMHG | HEART RATE: 96 BPM | BODY MASS INDEX: 26.12 KG/M2 | WEIGHT: 153 LBS

## 2025-01-21 DIAGNOSIS — N94.89 ADNEXAL MASS: Primary | ICD-10-CM

## 2025-01-21 PROCEDURE — 3074F SYST BP LT 130 MM HG: CPT | Performed by: OBSTETRICS & GYNECOLOGY

## 2025-01-21 PROCEDURE — 3078F DIAST BP <80 MM HG: CPT | Performed by: OBSTETRICS & GYNECOLOGY

## 2025-01-21 PROCEDURE — 99213 OFFICE O/P EST LOW 20 MIN: CPT | Performed by: OBSTETRICS & GYNECOLOGY

## 2025-01-21 NOTE — PROGRESS NOTES
1/21/25    Clara Laughlin  1975    Chief Complaint   Patient presents with    Other     Pt present today to discuss left ovarian cyst and u/s. Pt voiced no current concerns.        Clara Laughlin is a 49 y.o. female who presents today for evaluation of pelvic cystic mass.    0 Result Notes       Component  Ref Range & Units 10/22/24 1640 12/20/21 1626     0.0 - 35.0 U/mL 7.0 21.6 CM        Reviewed the following imaging showing left cystic mass    Us 12/2021 9.73cm  CT 5/2024   8 x 5 x 6 cm  MRI  10/2024      6.7 x 8.3cm  Ultrasound today 6.62 cm      Past Medical History:   Diagnosis Date    Abnormal uterine bleeding     Cough     Occ. Productive Cough Clear Sputum    Cyst of left ovary     Diabetes mellitus (HCC) Dx 2006 Or 2007    Dysmenorrhea     Fibroids     Hyperlipidemia     Hypertension     \"on medication for high blood pressure of 6- 7 yrs\"    Pelvic pain     Sinus problem     Stage 3a chronic kidney disease (CKD) (HCC)     Teeth missing     Upper And Lower    Viral gastroenteritis     [place] teeth extracted     One [place] Tooth Extracted In Past       Past Surgical History:   Procedure Laterality Date    CHOLECYSTECTOMY, LAPAROSCOPIC  7-7-15    COLONOSCOPY  2000's    COLONOSCOPY N/A 3/9/2023    COLONOSCOPY DIAGNOSTIC performed by Ivy Lamb MD at [facility] ENDOSCOPY    ENDOMETRIAL ABLATION  2012    Tubal Ligation Also Done    HYSTERECTOMY (CERVIX STATUS UNKNOWN)  4/19/16    supracervical ABD hysterectomy/ repair of serosal tear of bowel    TUBAL LIGATION  2012    Done With Endometrial Ablation    UMBILICAL HERNIA REPAIR N/A 8/9/2024    OPEN HERNIA UMBILICAL REPAIR WITH MESH performed by Shree Giordano MD at [facility] OR    WISDOM TOOTH EXTRACTION      One [place] Tooth Extracted In Past       Social History     Tobacco Use    Smoking status: Light Smoker     Types: Cigarettes, Cigars     Start date: 4/15/2000    Smokeless tobacco: Never    Tobacco comments:     \"I Smoke Black And Mild,

## 2025-02-03 ENCOUNTER — OFFICE VISIT (OUTPATIENT)
Age: 50
End: 2025-02-03
Payer: COMMERCIAL

## 2025-02-03 VITALS
TEMPERATURE: 99 F | OXYGEN SATURATION: 99 % | DIASTOLIC BLOOD PRESSURE: 70 MMHG | SYSTOLIC BLOOD PRESSURE: 116 MMHG | HEART RATE: 97 BPM

## 2025-02-03 DIAGNOSIS — R09.81 NASAL CONGESTION: ICD-10-CM

## 2025-02-03 DIAGNOSIS — R52 BODY ACHES: ICD-10-CM

## 2025-02-03 DIAGNOSIS — B34.9 VIRAL ILLNESS: Primary | ICD-10-CM

## 2025-02-03 DIAGNOSIS — R11.2 NAUSEA AND VOMITING, UNSPECIFIED VOMITING TYPE: ICD-10-CM

## 2025-02-03 DIAGNOSIS — R50.9 FEVER, UNSPECIFIED FEVER CAUSE: ICD-10-CM

## 2025-02-03 DIAGNOSIS — R10.9 ABDOMINAL CRAMPING: ICD-10-CM

## 2025-02-03 PROCEDURE — 87428 SARSCOV & INF VIR A&B AG IA: CPT | Performed by: NURSE PRACTITIONER

## 2025-02-03 PROCEDURE — 3078F DIAST BP <80 MM HG: CPT | Performed by: NURSE PRACTITIONER

## 2025-02-03 PROCEDURE — 3074F SYST BP LT 130 MM HG: CPT | Performed by: NURSE PRACTITIONER

## 2025-02-03 PROCEDURE — 99213 OFFICE O/P EST LOW 20 MIN: CPT | Performed by: NURSE PRACTITIONER

## 2025-02-03 RX ORDER — ONDANSETRON 4 MG/1
4 TABLET, ORALLY DISINTEGRATING ORAL 3 TIMES DAILY PRN
Qty: 21 TABLET | Refills: 0 | Status: SHIPPED | OUTPATIENT
Start: 2025-02-03

## 2025-02-03 RX ORDER — DICYCLOMINE HYDROCHLORIDE 10 MG/1
10 CAPSULE ORAL
Qty: 40 CAPSULE | Refills: 0 | Status: SHIPPED | OUTPATIENT
Start: 2025-02-03 | End: 2025-02-13

## 2025-02-03 RX ORDER — FLUTICASONE PROPIONATE 50 MCG
1 SPRAY, SUSPENSION (ML) NASAL DAILY
Qty: 16 G | Refills: 0 | Status: SHIPPED | OUTPATIENT
Start: 2025-02-03

## 2025-02-03 ASSESSMENT — ENCOUNTER SYMPTOMS
WHEEZING: 0
RHINORRHEA: 0
SWOLLEN GLANDS: 0
DIARRHEA: 1
ABDOMINAL PAIN: 1
CONSTIPATION: 1
SORE THROAT: 1
SHORTNESS OF BREATH: 0
COUGH: 1
CHEST TIGHTNESS: 0
SINUS PRESSURE: 0
VOMITING: 1
BLOOD IN STOOL: 0
SINUS PAIN: 0
TROUBLE SWALLOWING: 0
NAUSEA: 1

## 2025-02-03 NOTE — PROGRESS NOTES
Clara Laughlin (:  1975) is a 49 y.o. female,Established patient, here for evaluation of the following chief complaint(s):    Nausea (Last wed. ), Nausea & Vomiting (This am. ), Diarrhea, Generalized Body Aches, Fever (sat), Chills, and Fatigue      SUBJECTIVE/OBJECTIVE:  Pt presents with c/o as stated below- did have diarrhea but took zofran and now is constipated, having intermittent abdominal cramping     Cold Symptoms   This is a new problem. The current episode started in the past 7 days. The problem has been gradually worsening. The maximum temperature recorded prior to her arrival was 100.4 - 100.9 F. The fever has been present for 1 to 2 days. Associated symptoms include abdominal pain (cramping), coughing (not too bad), diarrhea (resolved), headaches, nausea, a sore throat (resolved) and vomiting. Pertinent negatives include no chest pain, congestion, dysuria, ear pain, joint pain, joint swelling, neck pain, plugged ear sensation, rash, rhinorrhea, sinus pain, sneezing, swollen glands or wheezing.       Allergies   Allergen Reactions    Pravachol [Pravastatin] Nausea Only        Current Outpatient Medications   Medication Sig Dispense Refill    dicyclomine (BENTYL) 10 MG capsule Take 1 capsule by mouth 4 times daily (before meals and nightly) for 10 days 40 capsule 0    ondansetron (ZOFRAN-ODT) 4 MG disintegrating tablet Take 1 tablet by mouth 3 times daily as needed for Nausea or Vomiting 21 tablet 0    fluticasone (FLONASE) 50 MCG/ACT nasal spray 1 spray by Each Nostril route daily 16 g 0    FLUoxetine (PROZAC) 40 MG capsule Take 1 capsule by mouth daily 30 capsule 0    amLODIPine (NORVASC) 5 MG tablet Take 1 tablet by mouth once daily 90 tablet 3    dapagliflozin (FARXIGA) 5 MG tablet Take 1 tablet by mouth every morning 90 tablet 3    losartan (COZAAR) 50 MG tablet Take 1 tablet by mouth daily 90 tablet 3    norethindrone (MAHENDRA) 0.35 MG tablet Take 1 tablet by mouth daily 84 tablet 3

## 2025-02-10 DIAGNOSIS — F41.9 ANXIETY: ICD-10-CM

## 2025-02-10 DIAGNOSIS — F32.A ACUTE DEPRESSION: ICD-10-CM

## 2025-02-10 RX ORDER — FLUOXETINE HYDROCHLORIDE 40 MG/1
40 CAPSULE ORAL DAILY
Qty: 30 CAPSULE | Refills: 0 | OUTPATIENT
Start: 2025-02-10

## 2025-02-27 DIAGNOSIS — F41.9 ANXIETY: ICD-10-CM

## 2025-02-27 DIAGNOSIS — F32.A ACUTE DEPRESSION: ICD-10-CM

## 2025-03-03 RX ORDER — FLUOXETINE HYDROCHLORIDE 40 MG/1
40 CAPSULE ORAL DAILY
Qty: 30 CAPSULE | Refills: 0 | Status: SHIPPED | OUTPATIENT
Start: 2025-03-03

## 2025-03-11 ENCOUNTER — HOSPITAL ENCOUNTER (OUTPATIENT)
Age: 50
Discharge: HOME | End: 2025-03-11
Payer: COMMERCIAL

## 2025-03-11 DIAGNOSIS — I12.9 HYPERTENSIVE RENAL DISEASE: ICD-10-CM

## 2025-03-11 DIAGNOSIS — K42.9 UMBILICAL HERNIA WITHOUT OBSTRUCTION OR GANGRENE: ICD-10-CM

## 2025-03-11 DIAGNOSIS — N18.31 STAGE 3A CHRONIC KIDNEY DISEASE (HCC): ICD-10-CM

## 2025-03-11 DIAGNOSIS — E11.21 DIABETIC NEPHROPATHY ASSOCIATED WITH TYPE 2 DIABETES MELLITUS (HCC): ICD-10-CM

## 2025-03-11 LAB
ALBUMIN: 4.2 G/DL (ref 3.4–5)
ANION GAP SERPL CALCULATED.3IONS-SCNC: 16 MMOL/L (ref 9–17)
BUN SERPL-MCNC: 11 MG/DL (ref 7–20)
CALCIUM SERPL-MCNC: 9 MG/DL (ref 8.3–10.6)
CHLORIDE SERPL-SCNC: 105 MMOL/L (ref 99–110)
CO2 SERPL-SCNC: 19 MMOL/L (ref 21–32)
CREAT SERPL-MCNC: 1.2 MG/DL (ref 0.6–1.1)
CREAT UR-MCNC: 96.2 MG/DL (ref 28–217)
GFR, ESTIMATED: 55 ML/MIN/1.73M2
GLUCOSE SERPL-MCNC: 141 MG/DL (ref 74–99)
MICROALBUMIN UR-MCNC: <1 MG/L
MICROALBUMIN/CREAT UR-RTO: NORMAL MCG/MG CREAT (ref 0–2)
PHOSPHATE SERPL-MCNC: 3.6 MG/DL (ref 2.5–4.9)
POTASSIUM SERPL-SCNC: 3.8 MMOL/L (ref 3.5–5.1)
SODIUM SERPL-SCNC: 140 MMOL/L (ref 136–145)

## 2025-03-11 PROCEDURE — 80069 RENAL FUNCTION PANEL: CPT

## 2025-03-11 PROCEDURE — 82570 ASSAY OF URINE CREATININE: CPT

## 2025-03-11 PROCEDURE — 82043 UR ALBUMIN QUANTITATIVE: CPT

## 2025-04-08 ENCOUNTER — HOSPITAL ENCOUNTER (OUTPATIENT)
Age: 50
Discharge: HOME | End: 2025-04-08
Payer: COMMERCIAL

## 2025-04-08 DIAGNOSIS — E11.9 TYPE 2 DIABETES MELLITUS WITHOUT COMPLICATION, WITHOUT LONG-TERM CURRENT USE OF INSULIN: ICD-10-CM

## 2025-04-08 LAB
ALBUMIN SERPL-MCNC: 4.3 G/DL (ref 3.4–5)
ALBUMIN/GLOB SERPL: 1.5 {RATIO} (ref 1.1–2.2)
ALP SERPL-CCNC: 97 U/L (ref 40–129)
ALT SERPL-CCNC: 17 U/L (ref 10–40)
ANION GAP SERPL CALCULATED.3IONS-SCNC: 14 MMOL/L (ref 9–17)
AST SERPL-CCNC: 18 U/L (ref 15–37)
BILIRUB SERPL-MCNC: 0.8 MG/DL (ref 0–1)
BUN SERPL-MCNC: 11 MG/DL (ref 7–20)
CALCIUM SERPL-MCNC: 9.3 MG/DL (ref 8.3–10.6)
CHLORIDE SERPL-SCNC: 102 MMOL/L (ref 99–110)
CHOLEST SERPL-MCNC: 88 MG/DL (ref 125–199)
CO2 SERPL-SCNC: 22 MMOL/L (ref 21–32)
CREAT SERPL-MCNC: 1.4 MG/DL (ref 0.6–1.1)
EST. AVERAGE GLUCOSE BLD GHB EST-MCNC: 168 MG/DL
GFR, ESTIMATED: 40 ML/MIN/1.73M2
GLUCOSE SERPL-MCNC: 182 MG/DL (ref 74–99)
HBA1C MFR BLD: 7.5 % (ref 4.2–6.3)
HDLC SERPL-MCNC: 47 MG/DL
LDLC SERPL CALC-MCNC: 21 MG/DL
POTASSIUM SERPL-SCNC: 3.9 MMOL/L (ref 3.5–5.1)
PROT SERPL-MCNC: 7.2 G/DL (ref 6.4–8.2)
SODIUM SERPL-SCNC: 138 MMOL/L (ref 136–145)
TRIGL SERPL-MCNC: 100 MG/DL

## 2025-04-08 PROCEDURE — 83036 HEMOGLOBIN GLYCOSYLATED A1C: CPT

## 2025-04-08 PROCEDURE — 80061 LIPID PANEL: CPT

## 2025-04-08 PROCEDURE — 80053 COMPREHEN METABOLIC PANEL: CPT

## 2025-04-08 RX ORDER — ATORVASTATIN CALCIUM 40 MG/1
TABLET, FILM COATED ORAL
Qty: 90 TABLET | Refills: 3 | Status: SHIPPED | OUTPATIENT
Start: 2025-04-08

## 2025-04-12 DIAGNOSIS — F41.9 ANXIETY: ICD-10-CM

## 2025-04-12 DIAGNOSIS — F32.A ACUTE DEPRESSION: ICD-10-CM

## 2025-04-14 RX ORDER — FLUOXETINE HYDROCHLORIDE 40 MG/1
40 CAPSULE ORAL DAILY
Qty: 90 CAPSULE | Refills: 0 | Status: SHIPPED | OUTPATIENT
Start: 2025-04-14

## 2025-05-27 ENCOUNTER — OFFICE VISIT (OUTPATIENT)
Age: 50
End: 2025-05-27
Payer: COMMERCIAL

## 2025-05-27 VITALS
TEMPERATURE: 98.1 F | DIASTOLIC BLOOD PRESSURE: 60 MMHG | WEIGHT: 154 LBS | SYSTOLIC BLOOD PRESSURE: 110 MMHG | BODY MASS INDEX: 26.42 KG/M2 | HEART RATE: 100 BPM | OXYGEN SATURATION: 97 %

## 2025-05-27 DIAGNOSIS — A08.4 VIRAL GASTROENTERITIS: Primary | ICD-10-CM

## 2025-05-27 PROCEDURE — 3078F DIAST BP <80 MM HG: CPT | Performed by: NURSE PRACTITIONER

## 2025-05-27 PROCEDURE — 3074F SYST BP LT 130 MM HG: CPT | Performed by: NURSE PRACTITIONER

## 2025-05-27 PROCEDURE — 99213 OFFICE O/P EST LOW 20 MIN: CPT | Performed by: NURSE PRACTITIONER

## 2025-05-27 RX ORDER — ONDANSETRON 4 MG/1
4 TABLET, ORALLY DISINTEGRATING ORAL EVERY 8 HOURS PRN
Qty: 12 TABLET | Refills: 0 | Status: SHIPPED | OUTPATIENT
Start: 2025-05-27

## 2025-05-27 NOTE — PATIENT INSTRUCTIONS
Thank you for choosing [facility] and Walk In with Marlena Shanks NP!! You will receive a survey in the mail regarding the care you received during your stay. Your input is valuable to us. Our goal is that the care you received was excellent. If we did not meet this goal, please let us know how we can become excellent. We hope that you will definitely recommend us to your friends and family and choose us for your future healthcare needs. There is no greater compliment than a referral.     Have a wonderful day!!  Marie Epstein CMA, AEMT, CPI Instructor

## 2025-05-27 NOTE — PROGRESS NOTES
Clara Laughlin is a 49 y.o. female.    Chief Complaint   Patient presents with    Abdominal Cramping     Started yesterday pm    Nausea & Vomiting     Vomiting started about 8am today    Diarrhea     This am at 4:40        SUBJECTIVE:     HPI     Clara is a 50yo F established patient who presents re: gastrointestinal concerns that started yesterday evening.     Started with abdominal cramping yesterday PM, then developed diarrhea ~4:40 A, had another episode of diarrhea just before coming to clinic.   N&V started ~8A this morning with an episode of vomiting before coming to clinic.   She admits that she hasn't been drinking much in the way of fluid this AM.     She's had no associated fevers, chills, upper resp symptoms, urinary symptoms, hematemesis, or hematochezia.     She is not aware of any exotic food intake, not suspicious for food poisoning.     Past medical history includes HTN, post-cholecystectomy syndrome, t2DM, umbilical hernia, CKD (Cr runs 1.2-1.4). Last A1c 4/2025 =7.5%.    Review of Systems  All other systems negative except what is noted in HPI.     OBJECTIVE:      /60   Pulse 100   Temp 98.1 °F (36.7 °C)   Wt 69.9 kg (154 lb)   LMP  (LMP Unknown)   SpO2 97%   BMI 26.42 kg/m²       Component      Latest Ref Rng 4/8/2025   Sodium      136 - 145 mmol/L 138    Potassium      3.5 - 5.1 mmol/L 3.9    Chloride      99 - 110 mmol/L 102    CARBON DIOXIDE      21 - 32 mmol/L 22    Anion Gap      9 - 17 mmol/L 14    Glucose      74 - 99 mg/dL 182 (H)    BUN,BUNPL      7 - 20 mg/dL 11    Creatinine      0.6 - 1.1 mg/dL 1.4 (H)    Est, Glom Filt Rate      >60 mL/min/1.73m2 40 (L)    Calcium      8.3 - 10.6 mg/dL 9.3    Total Protein      6.4 - 8.2 g/dL 7.2    Albumin      3.4 - 5.0 g/dL 4.3    Albumin/Globulin Ratio      1.1 - 2.2  1.5    Total Bilirubin      0.0 - 1.0 mg/dL 0.8    Alkaline Phosphatase      40 - 129 U/L 97    ALT      10 - 40 U/L 17    AST      15 - 37 U/L 18

## 2025-05-29 ENCOUNTER — TELEPHONE (OUTPATIENT)
Age: 50
End: 2025-05-29

## 2025-05-29 NOTE — TELEPHONE ENCOUNTER
Called patient and updated that provider has completed work note for patient.   Patient voices understanding.

## 2025-05-29 NOTE — TELEPHONE ENCOUNTER
Patient called and has been sick since Tuesday .  Patient went to walk in clinic and has stomach virus.   Have been vomiting / diarrhea was given medication for nausea / vomiting but not diarrhea .  Stomach cramps prior to diarrhea .   Patient was feeling better last evening and ate some food but now feels bad again .   Patient needs work excuse  Explained to patient to push fluids / electrolytes  and explained provider says can use Imodium if don't have fever for the diarrhea .    Please advise

## 2025-06-24 ENCOUNTER — TELEPHONE (OUTPATIENT)
Age: 50
End: 2025-06-24

## 2025-06-24 NOTE — TELEPHONE ENCOUNTER
Called patient and explained that PCP received [employer] to complete for patient .   Explained that patient wasn't seen by PCP during this time frame so PCP isn't able to complete paper work .. Patient voices understanding.   No questions or concerns at this time .

## 2025-06-24 NOTE — TELEPHONE ENCOUNTER
Called [employer] and updated that PCP is able to complete paper work cause PCP didn't see patient during this time.    Claim number   [id number]

## 2025-07-15 DIAGNOSIS — F41.9 ANXIETY: ICD-10-CM

## 2025-07-15 DIAGNOSIS — F32.A ACUTE DEPRESSION: ICD-10-CM

## 2025-07-16 RX ORDER — FLUOXETINE HYDROCHLORIDE 40 MG/1
40 CAPSULE ORAL DAILY
Qty: 90 CAPSULE | Refills: 0 | Status: SHIPPED | OUTPATIENT
Start: 2025-07-16

## (undated) DEVICE — TUBING, SUCTION, 9/32" X 10', STRAIGHT: Brand: MEDLINE

## (undated) DEVICE — SHEET, T, LAPAROTOMY, STERILE: Brand: MEDLINE

## (undated) DEVICE — INTENDED FOR TISSUE SEPARATION, AND OTHER PROCEDURES THAT REQUIRE A SHARP SURGICAL BLADE TO PUNCTURE OR CUT.: Brand: BARD-PARKER ® STAINLESS STEEL BLADES

## (undated) DEVICE — SPONGE,TONSIL,DBL STRNG,XRAY,SM,7/8",ST: Brand: MEDLINE INDUSTRIES, INC.

## (undated) DEVICE — NEEDLE HYPO 20GA L1.5IN YEL POLYPR HUB S STL REG BVL STR

## (undated) DEVICE — GOWN,SIRUS,POLYRNF,BRTHSLV,XLN/XL,20/CS: Brand: MEDLINE

## (undated) DEVICE — SYRINGE MED 20ML STD CLR PLAS LUERLOCK TIP N CTRL DISP

## (undated) DEVICE — Z INACTIVE USE 2660663 SOLUTION IRRIG 1000ML STRIL H2O USP PLAS POUR BTL

## (undated) DEVICE — Z DISCONTINUED USE 2220190 SUTURE VICRYL SZ 3-0 L27IN ABSRB UD L26MM SH 1/2 CIR J416H

## (undated) DEVICE — DRESSING TRNSPAR W4XL10IN FLM MIC POR SURESITE 123

## (undated) DEVICE — TOWEL,OR,DSP,ST,BLUE,STD,6/PK,12PK/CS: Brand: MEDLINE

## (undated) DEVICE — SUTURE VICRYL SZ 4-0 L18IN ABSRB UD L19MM PS-2 3/8 CIR PRIM J496H

## (undated) DEVICE — YANKAUER,FLEXIBLE HANDLE,REGLR CAPACITY: Brand: MEDLINE INDUSTRIES, INC.

## (undated) DEVICE — PENCIL ES CRD L10FT HND SWCHING ROCK SWCH W/ EDGE COAT BLDE

## (undated) DEVICE — SUTURE PDS II SZ 2-0 L27IN ABSRB VLT L26MM CT-2 1/2 CIR Z333H

## (undated) DEVICE — GLOVE SURG SZ 6 THK91MIL LTX FREE SYN POLYISOPRENE ANTI

## (undated) DEVICE — Z INACTIVE USE 2863041 SPONGE GZ W4XL4IN 100% COT 16 PLY RADPQ HIGHLY ABSRB

## (undated) DEVICE — BINDER ABD UNISX 9IN 62IN L AND XL UNIV

## (undated) DEVICE — MARKER SURG SKIN UTIL REGULAR/FINE 2 TIP W/ RUL AND 9 LBL

## (undated) DEVICE — GLOVE ORANGE PI 7 1/2   MSG9075

## (undated) DEVICE — COUNTER NDL 30 COUNT FOAM STRP SGL MAG

## (undated) DEVICE — PACK,BASIC,IX: Brand: MEDLINE

## (undated) DEVICE — Z INACTIVE NO ACTIVE SUPPLIER APPLICATOR MEDICATED 26 CC TINT HI-LITE ORNG STRL CHLORAPREP

## (undated) DEVICE — ENDOSCOPY KIT: Brand: MEDLINE INDUSTRIES, INC.